# Patient Record
Sex: FEMALE | Race: WHITE | NOT HISPANIC OR LATINO | Employment: OTHER | URBAN - METROPOLITAN AREA
[De-identification: names, ages, dates, MRNs, and addresses within clinical notes are randomized per-mention and may not be internally consistent; named-entity substitution may affect disease eponyms.]

---

## 2017-04-20 DIAGNOSIS — C50.912 MALIGNANT NEOPLASM OF LEFT FEMALE BREAST, UNSPECIFIED SITE OF BREAST: Primary | ICD-10-CM

## 2017-05-11 ENCOUNTER — ONCOLOGY VISIT (OUTPATIENT)
Dept: ONCOLOGY | Facility: CLINIC | Age: 77
End: 2017-05-11
Payer: COMMERCIAL

## 2017-05-11 VITALS
DIASTOLIC BLOOD PRESSURE: 71 MMHG | SYSTOLIC BLOOD PRESSURE: 136 MMHG | TEMPERATURE: 98.3 F | HEIGHT: 66 IN | BODY MASS INDEX: 39.86 KG/M2 | RESPIRATION RATE: 20 BRPM | HEART RATE: 68 BPM | OXYGEN SATURATION: 96 % | WEIGHT: 248 LBS

## 2017-05-11 DIAGNOSIS — C50.912 MALIGNANT NEOPLASM OF LEFT FEMALE BREAST, UNSPECIFIED SITE OF BREAST: Primary | ICD-10-CM

## 2017-05-11 DIAGNOSIS — C50.912 MALIGNANT NEOPLASM OF LEFT FEMALE BREAST, UNSPECIFIED SITE OF BREAST: ICD-10-CM

## 2017-05-11 DIAGNOSIS — N91.2 ABSENCE OF MENSTRUATION: ICD-10-CM

## 2017-05-11 LAB
ALBUMIN SERPL-MCNC: 3.8 G/DL (ref 3.4–5)
ALP SERPL-CCNC: 75 U/L (ref 40–150)
ALT SERPL W P-5'-P-CCNC: 30 U/L (ref 0–50)
AST SERPL W P-5'-P-CCNC: 20 U/L (ref 0–45)
BILIRUB DIRECT SERPL-MCNC: 0.1 MG/DL (ref 0–0.2)
BILIRUB SERPL-MCNC: 0.5 MG/DL (ref 0.2–1.3)
CALCIUM SERPL-MCNC: 8.9 MG/DL (ref 8.5–10.1)
PROT SERPL-MCNC: 7.3 G/DL (ref 6.8–8.8)

## 2017-05-11 PROCEDURE — 36415 COLL VENOUS BLD VENIPUNCTURE: CPT | Performed by: INTERNAL MEDICINE

## 2017-05-11 PROCEDURE — 99214 OFFICE O/P EST MOD 30 MIN: CPT | Performed by: INTERNAL MEDICINE

## 2017-05-11 PROCEDURE — 82310 ASSAY OF CALCIUM: CPT | Performed by: INTERNAL MEDICINE

## 2017-05-11 PROCEDURE — 80076 HEPATIC FUNCTION PANEL: CPT | Performed by: INTERNAL MEDICINE

## 2017-05-11 ASSESSMENT — PAIN SCALES - GENERAL: PAINLEVEL: NO PAIN (0)

## 2017-05-11 NOTE — MR AVS SNAPSHOT
After Visit Summary   5/11/2017    Leida Goddard    MRN: [id number]           Patient Information     Date Of Birth          1940        Visit Information        Provider Department      5/11/2017 10:00 AM Heydi Queen MD [facility]        Today's Diagnoses     Malignant neoplasm of left female breast, unspecified site of breast (H)    -  1       Follow-ups after your visit        Your next 10 appointments already scheduled     Jun 06, 2017  9:00 AM CDT   DX HIP/PELVIS/SPINE with MGDX1   [facility])    [address] [place] [address] [id number] [phone]           Please do not take any of the following 48 hours prior to your exam: vitamins, calcium tablets, antacids.            Nov 14, 2017  9:15 AM CST   LAB with LAB ONC [facility])    [address] [place] [address] [id number] [phone]           Patient must bring picture ID.  Patient should be prepared to give a urine specimen  Please do not eat 10-12 hours before your appointment if you are coming in fasting for labs on lipids, cholesterol, or glucose (sugar).  Pregnant women should follow their Care Team instructions. Water with medications is okay. Do not drink coffee or other fluids.   If you have concerns about taking  your medications, please ask at office or if scheduling via Myert, send a message by clicking on Secure Messaging, Message Your Care Team.            Nov 14, 2017 10:00 AM CST   Return Visit with Heydi Queen MD   [facility])    [address] [place] [address] [id number] [phone]              Future tests that were ordered for you today     Open Future Orders        Priority Expected Expires Ordered    DX Hip/Pelvis/Spine Routine 5/11/2017 5/11/2018 5/11/2017            Who to contact     If you have  "questions or need follow up information about today's clinic visit or your schedule please contact [facility] directly at [phone].  Normal or non-critical lab and imaging results will be communicated to you by MyChart, letter or phone within 4 business days after the clinic has received the results. If you do not hear from us within 7 days, please contact the clinic through Tienda [employer] / [employer]m Shophart or phone. If you have a critical or abnormal lab result, we will notify you by phone as soon as possible.  Submit refill requests through Pipefish or call your pharmacy and they will forward the refill request to us. Please allow 3 business days for your refill to be completed.          Additional Information About Your Visit        Pipefish Information     Pipefish is an electronic gateway that provides easy, online access to your medical records. With Pipefish, you can request a clinic appointment, read your test results, renew a prescription or communicate with your care team.     To sign up for Pipefish visit the website at [email or web address]   You will be asked to enter the access code listed below, as well as some personal information. Please follow the directions to create your username and password.     Your access code is: [email or web address]  Expires: 2017 10:05 AM     Your access code will  in 90 days. If you need help or a new code, please contact your [facility] or call [phone] for assistance.        Care EveryWhere ID     This is your Care EveryWhere ID. This could be used by other organizations to access your [place] medical records  [id number]        Your Vitals Were     Pulse Temperature Respirations Height Pulse Oximetry BMI (Body Mass Index)    68 98.3  F (36.8  C) (Oral) 20 1.689 m (5' 6.5\") 96% 39.43 kg/m2       Blood Pressure from Last 3 Encounters:   17 136/71   16 128/62   16 121/69    Weight from Last 3 Encounters:   17 " 112.5 kg (248 lb)   03/01/16 112.8 kg (248 lb 11.2 oz)   09/01/15 114.1 kg (251 lb 9.6 oz)               Primary Care Provider Office Phone # Fax #    Casey Kinsey [phone]       [facility] [address] [place] [address]        Thank you!     Thank you for choosing [facility]  for your care. Our goal is always to provide you with excellent care. Hearing back from our patients is one way we can continue to improve our services. Please take a few minutes to complete the written survey that you may receive in the mail after your visit with us. Thank you!             Your Updated Medication List - Protect others around you: Learn how to safely use, store and throw away your medicines at www.disposemymeds.org.          This list is accurate as of: 5/11/17 10:05 AM.  Always use your most recent med list.                   Brand Name Dispense Instructions for use    acetaminophen 500 MG tablet    TYLENOL     Take 500 mg by mouth every 4 hours as needed       anastrozole 1 MG tablet    ARIMIDEX    90 tablet    Take 1 tablet (1 mg) by mouth daily       CRESTOR 10 MG tablet   Generic drug:  rosuvastatin      Take 10 mg by mouth       econazole nitrate 1 % cream     340 g    Apply topically 2 times daily       furosemide 40 MG tablet    LASIX     Take 20 mg by mouth daily       levothyroxine 25 MCG tablet    SYNTHROID/LEVOTHROID     1 TABLET DAILY       losartan 100 MG tablet    COZAAR     Take 100 mg by mouth daily.       potassium chloride SA 20 MEQ CR tablet    K-DUR/KLOR-CON M     Take  by mouth daily.       PROPRANOLOL HCL PO      Take 40 mg by mouth 2 times daily.       SB LOW DOSE ASA EC 81 MG EC tablet   Generic drug:  aspirin      1 TABLET DAILY       VICTOZA SC      Inject 0.6 mg Subcutaneous daily       Vitamin D-3 5000 UNITS Tabs      Take by mouth daily

## 2017-05-11 NOTE — NURSING NOTE
"Oncology Rooming Note    May 11, 2017 9:44 AM   Leida Goddard is a 77 year old female who presents for:    Chief Complaint   Patient presents with     Oncology Clinic Visit     6 mo f/u     Initial Vitals: /71  Pulse 68  Temp 98.3  F (36.8  C) (Oral)  Resp 20  Ht 1.689 m (5' 6.5\")  Wt 112.5 kg (248 lb)  SpO2 96%  BMI 39.43 kg/m2 Estimated body mass index is 39.43 kg/(m^2) as calculated from the following:    Height as of this encounter: 1.689 m (5' 6.5\").    Weight as of this encounter: 112.5 kg (248 lb). Body surface area is 2.3 meters squared.  No Pain (0) Comment: Data Unavailable   No LMP recorded. Patient has had a hysterectomy.  Allergies reviewed: Yes  Medications reviewed: Yes    Medications: Medication refills not needed today.  Pharmacy name entered into FriendsEAT: [id number] - [place], [place] - [address] [facility]    Clinical concerns:     8 minutes for nursing intake (face to face time)     THAD CHANEL LPN              "

## 2017-05-12 NOTE — PROGRESS NOTES
ONCOLOGY DIAGNOSIS: January 2013: Stage I, T1a NX multifocal invasive lobular carcinoma of the left breast. Final pathology showed 4 foci of invasive lobular carcinoma, largest measuring 0.11 cm. There was also a 0.1 cm and 0.08 cm and 0.05 cm. Lymph nodes were not examined. Lymphovascular invasion not identified. Estrogen receptor positive, progesterone receptor positive, HER-2 non-amplified by FISH.      THERAPY TO DATE:   1. January 7, 2013. Left breast seed localized excisional biopsy.   2. February 2013- April 2013. Radiotherapy to left breast with 5040 cGy and 1000 centigrade tumor bed boost.   3. April 2013 to Present. Adjuvant anastrozole 1 mg p.o. daily.      INTERVAL HISTORY:  Leida Goddard is a delightful 77-year-old female diagnosed with stage I, pT1a NX multifocal invasive lobular carcinoma, grade 2, with LCIS in the left breast in 01/2013 after found to have calcifications on routine mammogram.  Patient presents to clinic for followup of her malignancy.  On today's visit, she tells me she is doing extremely well.  In fact, she has met with a nutritionist and has already lost significant weight.  She is looking forward to going fishing later today.  Denies any fevers, chills, nausea, vomiting, chest pain, shortness of breath or cough.  No abdominal discomfort.  No new palpable masses.  The remainder of a comprehensive review of systems is negative.      SOCIAL HISTORY:  , retired.  No current tobacco or alcohol use.  Plans meeting with a friend and going fishing on [place] later today.      PHYSICAL EXAMINATION:   VITAL SIGNS:  Blood pressure 136/71, pulse 68, respirations 20, temp 98.3, pulse ox 96% on room air, weight 248 pounds, BMI is 39.43.   GENERAL:  Comfortable, no acute distress, pleasant.   HEENT:  Atraumatic, normocephalic.  Pupils equal, reactive.  Sclerae anicteric.  Oropharynx:  Moist mucous membranes.  No lesions, ulcers or exudate.   NECK:  Supple, full range of motion.   Trachea midline.   HEART:  Regular rate and rhythm, normal S1, S2, no murmurs, gallop.  No edema.   LUNGS:  Clear to auscultation bilaterally.  No crackles or wheezes.  Normal respiratory effort.   ABDOMEN:  Positive bowel sounds, soft, nontender.   EXTREMITIES:  No cyanosis, warm.   MUSCULOSKELETAL:  No point tenderness.   LYMPHATICS:  No cervical, supraclavicular, axillary nodes palpable.   SKIN:  No petechiae or rashes.   NEUROLOGIC:  Alert and oriented.   BREASTS:  Examined in the upright and supine position.  Right breast larger than the left.  Right breast, no dominant masses.  Left breast, radiation changes and surgical defect in the lower quadrants.  Nipple everted without discharge.      LABORATORY DATA:  Alk phos 75, ALT 30, AST 20.      Mammogram from 11/2016 shows heterogeneously dense breasts.  Lumpectomy scar is noted in the left medial breast, but there is no evidence for malignancy.  No change compared to prior study.       DEXA scan from 03/2015 showed osteopenia. Compared to 10/2013 there does seem to be progression or decreased density. Previously, her T-score was -0.1 in the lumbar area and -0.8 in the left femoral neck. Currently, the T-score is -1.1 in the left femoral neck and -1.0 the lumbar spine.     ASSESSMENT AND PLAN:   1.  Stage I, T1a NX multifocal invasive lobular carcinoma with lobular carcinoma in situ of left breast.  No evidence of recurrence by history, physical or most recent mammogram.  Tolerating anastrozole.  Continue Arimidex.  Return to clinic in 6 months with labs including LFTs.  Next mammogram 11/2017.   2.  Osteopenia.  Patient did discuss the fact that her most recent DEXA scan showed slightly worsening of her bone mineral density with her primary.  They discussed initiating a bisphosphonate; however, Dr. Grissom felt they should wait a little bit longer.  Therefore, encouraged the patient calcium and vitamin D.  I recommended repeat DEXA since it has been 2 years since  the last one.   3.  Hypercholesterolemia.  Patient's last labs from 10/2016 showed cholesterol 116, LDL 33, HDL 56, triglycerides 133.  Will need to continue to monitor annually since this may go up with the Arimidex.   4.  History of pulmonary nodules.  Patient did have a CT 2013 which shows stable calcified and noncalcified pulmonary nodules.  Nodules have been stable for greater than 2 years, consistent with benign etiology.   5.  Dyspnea.  Longstanding.  Felt to be related to deconditioning.  Followed by her primary.   6.  Health care maintenance.  Discussed the importance of healthy lifestyle with diet and exercise.  Applauded patient's current weight loss.  Recommended 30 minutes of moderate exercise 5 times a week or 75 minutes of vigorous exercise 3 times a week.         ERICK GUTIÉRREZ MD             D: 2017 10:08   T: 2017 13:43   MT: TS      Name:     LINDA BARRIENTOS   MRN:      [id number]        Account:      [id number]   :      1940           Visit Date:   2017      Document: [id number]       cc: Harley Grissom MD

## 2017-06-27 ENCOUNTER — OFFICE VISIT (OUTPATIENT)
Dept: DERMATOLOGY | Facility: CLINIC | Age: 77
End: 2017-06-27
Payer: COMMERCIAL

## 2017-06-27 DIAGNOSIS — Z86.19 H/O COLD SORES: ICD-10-CM

## 2017-06-27 DIAGNOSIS — L82.0 INFLAMED SEBORRHEIC KERATOSIS: ICD-10-CM

## 2017-06-27 DIAGNOSIS — D18.01 CHERRY ANGIOMA: Primary | ICD-10-CM

## 2017-06-27 DIAGNOSIS — L56.8 ACTINIC CHEILITIS: ICD-10-CM

## 2017-06-27 DIAGNOSIS — Z85.828 H/O NONMELANOMA SKIN CANCER: ICD-10-CM

## 2017-06-27 PROCEDURE — 99213 OFFICE O/P EST LOW 20 MIN: CPT | Mod: 25 | Performed by: DERMATOLOGY

## 2017-06-27 PROCEDURE — 17000 DESTRUCT PREMALG LESION: CPT | Mod: 59 | Performed by: DERMATOLOGY

## 2017-06-27 PROCEDURE — 17110 DESTRUCTION B9 LES UP TO 14: CPT | Performed by: DERMATOLOGY

## 2017-06-27 RX ORDER — VALACYCLOVIR HYDROCHLORIDE 500 MG/1
TABLET, FILM COATED ORAL
Qty: 3 TABLET | Refills: 0 | Status: SHIPPED | OUTPATIENT
Start: 2017-06-27 | End: 2017-11-14

## 2017-06-27 NOTE — MR AVS SNAPSHOT
After Visit Summary   6/27/2017    Leida Goddard    MRN: [id number]           Patient Information     Date Of Birth          1940        Visit Information        Provider Department      6/27/2017 10:45 AM Darwin Walsh MD [facility]        Today's Diagnoses     Cherry angioma    -  1    Inflamed seborrheic keratosis        Actinic cheilitis        H/O nonmelanoma skin cancer        H/O cold sores          Care Instructions    Cryotherapy    What is it?    Use of a very cold liquid, such as liquid nitrogen, to freeze and destroy abnormal skin cells that need to be removed    What should I expect?    Tenderness and redness    A small blister that might grow and fill with dark purple blood. There may be crusting.    More than one treatment may be needed if the lesions do not go away.    How do I care for the treated area?    Gently wash the area with your hands when bathing.    Use a thin layer of Vaseline to help with healing. You may use a Band-Aid.     The area should heal within 7-10 days and may leave behind a pink or lighter color.     Do not use an antibiotic or Neosporin ointment.     You may take acetaminophen (Tylenol) for pain.     Call your Doctor if you have:    Severe pain    Signs of infection (warmth, redness, cloudy yellow drainage, and or a bad smell)    Questions or concerns    Who should I call with questions?       [facility]: [phone]       [facility]: [phone]       For urgent needs outside of business hours call the [facility] at [phone]        and ask for the dermatology resident on call            Follow-ups after your visit        Your next 10 appointments already scheduled     Nov 14, 2017  9:15 AM CST   LAB with LAB ONC [facility] ([facility])    [address] [place] [id number] [phone]            Patient must bring picture ID.  Patient should be prepared to give a urine specimen  Please do not eat 10-12 hours before your appointment if you are coming in fasting for labs on lipids, cholesterol, or glucose (sugar).  Pregnant women should follow their Care Team instructions. Water with medications is okay. Do not drink coffee or other fluids.   If you have concerns about taking  your medications, please ask at office or if scheduling via [email or web address], send a message by clicking on Secure Messaging, Message Your Care Team.            Nov 14, 2017 10:00 AM CST   [facility]    [address] [place] [id number] [phone]              Who to contact     If you have questions or need follow up information about today's clinic visit or your schedule please contact [facility] directly at [phone].  Normal or non-critical lab and imaging results will be communicated to you by MyChart, letter or phone within 4 business days after the clinic has received the results. If you do not hear from us within 7 days, please contact the clinic through THERAVECTYShart or phone. If you have a critical or abnormal lab result, we will notify you by phone as soon as possible.  Submit refill requests through [email or web address] or call your pharmacy and they will forward the refill request to us. Please allow 3 business days for your refill to be completed.          Additional Information About Your Visit        [email or web address] Information     [email or web address] is an electronic gateway that provides easy, online access to your medical records. With [email or web address], you can request a clinic appointment, read your test results, renew a prescription or communicate with your care team.     To sign up for [email or web address] visit the website at [email or web address]   You will be asked to enter the access code listed below, as well as some personal information.  Please follow the directions to create your username and password.     Your access code is: [email or web address]  Expires: 2017 10:05 AM     Your access code will  in 90 days. If you need help or a new code, please contact your [facility] or call [phone] for assistance.        Care EveryWhere ID     This is your Care EveryWhere ID. This could be used by other organizations to access your [place] medical records  [id number]         Blood Pressure from Last 3 Encounters:   17 136/71   16 128/62   16 121/69    Weight from Last 3 Encounters:   17 112.5 kg (248 lb)   16 112.8 kg (248 lb 11.2 oz)   09/01/15 114.1 kg (251 lb 9.6 oz)              We Performed the Following     DESTRUCT PREMALIGNANT LESION, FIRST          Today's Medication Changes          These changes are accurate as of: 17  4:52 PM.  If you have any questions, ask your nurse or doctor.               Start taking these medicines.        Dose/Directions    valACYclovir 500 MG tablet   Commonly known as:  VALTREX   Used for:  H/O cold sores   Started by:  Darwin Walsh MD        Take 1 daily for 3 days starting today.   Quantity:  3 tablet   Refills:  0            Where to get your medicines      These medications were sent to [id number] - [place] - [address] [facility]  [address] [address]     Phone:  [phone]     valACYclovir 500 MG tablet                Primary Care Provider Office Phone # Fax #    Casey Kinsey [phone]       [facility] [address] [place] [address]        Equal Access to Services     KRIS WAGNER AH: Hadconstantine Jung, waaxda luqadaha, qaybta kaalmada yehuda, drake serna. So [facility] [phone].    ATENCIÓN: Si habla español, tiene a hernandez disposición servicios gratuitos de asistencia lingüística. Llame al  [phone].    We comply with applicable federal civil rights laws and [place] laws. We do not discriminate on the basis of race, color, national origin, age, disability sex, sexual orientation or gender identity.            Thank you!     Thank you for choosing [facility]  for your care. Our goal is always to provide you with excellent care. Hearing back from our patients is one way we can continue to improve our services. Please take a few minutes to complete the written survey that you may receive in the mail after your visit with us. Thank you!             Your Updated Medication List - Protect others around you: Learn how to safely use, store and throw away your medicines at www.disposemymeds.org.          This list is accurate as of: 6/27/17  4:52 PM.  Always use your most recent med list.                   Brand Name Dispense Instructions for use Diagnosis    acetaminophen 500 MG tablet    TYLENOL     Take 500 mg by mouth every 4 hours as needed        anastrozole 1 MG tablet    ARIMIDEX    90 tablet    Take 1 tablet (1 mg) by mouth daily    Malignant neoplasm of left female breast, unspecified site of breast       CRESTOR 10 MG tablet   Generic drug:  rosuvastatin      Take 10 mg by mouth        econazole nitrate 1 % cream     340 g    Apply topically 2 times daily    Tinea corporis       furosemide 40 MG tablet    LASIX     Take 20 mg by mouth daily        levothyroxine 25 MCG tablet    SYNTHROID/LEVOTHROID     1 TABLET DAILY        losartan 100 MG tablet    COZAAR     Take 100 mg by mouth daily.        potassium chloride SA 20 MEQ CR tablet    K-DUR/KLOR-CON M     Take  by mouth daily.        PROPRANOLOL HCL PO      Take 40 mg by mouth 2 times daily.        SB LOW DOSE ASA EC 81 MG EC tablet   Generic drug:  aspirin      1 TABLET DAILY        valACYclovir 500 MG tablet    VALTREX    3 tablet    Take 1 daily for 3 days starting today.    H/O cold sores       VICTOZA SC      Inject 0.6  mg Subcutaneous daily        Vitamin D-3 5000 UNITS Tabs      Take by mouth daily

## 2017-06-27 NOTE — PROGRESS NOTES
"[facility] Dermatology Note      Dermatology Problem List:  1.Actinic cheilitis, cryo    Encounter Date: Jun 27, 2017    CC:  Chief Complaint   Patient presents with     Mole     black spot on upper back       History of Present Illness:  Ms. Leida Goddard is a 77 year old female with a history of NMSC (SCC in 1980's) who presents for a black spot on her upper back. She was last seen 12/12/2014 by Dr. Kumar when there were a few SKs that were treated with cryo. She was at dinner with her granddaughter when she was rubbing the pt's back when she felt a \"lump\" and believes part of it may have come off. It looked gray at the time and has since looked improved Pt reports getting cold sores 2-3 times a year. Pt is with her granddaughter today. No other lesions of concern.    Past Medical History:   Patient Active Problem List   Diagnosis     Diabetes mellitus (H)     DALIA (obstructive sleep apnea)     HBP (high blood pressure)     Cushing's syndrome (H)     Hypothyroidism     Cancer of breast (H)     S/P total knee arthroplasty: lt     S/P total hip arthroplasty: Rt     Anemia due to blood loss, acute     Edema     Hypercholesterolemia     Malignant neoplasm of left female breast, unspecified site of breast     Past Medical History:   Diagnosis Date     Breast cancer (H) 12/7/2012    Left breast     CA - breast cancer 2/7/2013     Essential hypertension, benign     Hypertension, Benign     DALIA (obstructive sleep apnea)      Type II or unspecified type diabetes mellitus without mention of complication, not stated as uncontrolled     Diabetes mellitus     Past Surgical History:   Procedure Laterality Date     C ANESTH,BLEPHAROPLASTY       CHOLECYSTECTOMY  1966     HYSTERECTOMY TOTAL ABDOMINAL, BILATERAL SALPINGO-OOPHORECTOMY, COMBINED      age 43     LUMPECTOMY BREAST  1/7/13    Left breast     SPINE SURGERY      Lumbar laminectomy       Social History:  The patient is retired. The patient denies " use of tanning beds.    Family History:  There is no family history of skin cancer.    Medications:  Current Outpatient Prescriptions   Medication Sig Dispense Refill     Liraglutide (VICTOZA SC) Inject 0.6 mg Subcutaneous daily       anastrozole (ARIMIDEX) 1 MG tablet Take 1 tablet (1 mg) by mouth daily 90 tablet 3     rosuvastatin (CRESTOR) 10 MG tablet Take 10 mg by mouth        acetaminophen (TYLENOL) 500 MG tablet Take 500 mg by mouth every 4 hours as needed       Cholecalciferol (VITAMIN D-3) 5000 UNITS TABS Take by mouth daily       furosemide (LASIX) 40 MG tablet Take 20 mg by mouth daily        potassium chloride SA (K-DUR,KLOR-CON M) 20 MEQ tablet Take  by mouth daily.       losartan (COZAAR) 100 MG tablet Take 100 mg by mouth daily.       PROPRANOLOL HCL PO Take 40 mg by mouth 2 times daily.       LEVOTHYROXINE SODIUM 25 MCG OR TABS 1 TABLET DAILY       SB LOW DOSE ASA EC 81 MG OR TBEC 1 TABLET DAILY       econazole nitrate 1 % cream Apply topically 2 times daily (Patient not taking: Reported on 6/27/2017) 340 g 1       Allergies   Allergen Reactions     Metformin Nausea and Vomiting     Adhesive Tape Hives     Augmentin Diarrhea     Hctz Rash     Lisinopril Cough       Review of Systems:  -Skin Establ Pt: The patient denies any new rash, pruritus, or lesions that are symptomatic, changing or bleeding, except as per HPI.  -Constitutional: The patient denies fatigue, fevers, chills, unintended weight loss, and night sweats.    Physical exam:  Vitals: There were no vitals taken for this visit.  GEN: This is a well developed, well-nourished female in no acute distress, in a pleasant mood.    NEURO: Alert and oriented  PSYCH: In pleasant mood, appropriate affect  SKIN: Focused examination of the face and back was performed.  -inflamed SK of the back, s/p trauma  -There are bright red some shaped papules scattered on the back.   -site of prior NMSC appears well-healed  -red, gritty papule on the [place]  border of the lower lip  -No other lesions of concern on areas examined.       Impression/Plan:  1. Hx of non-melanoma skin cancer, no evidence of recurrence    2. 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 Cryo. take Valtrex 500mg daily x 3 days to prevent core sores from treating with liquid nitrogen.  3. Seborrheic keratosis, inflamed upper back    Cryotherapy procedure note: After verbal consent and discussion of risks and benefits including but no limited to dyspigmentation/scar, blister, and pain, 2 (AK, inflamed SK) were treated with 1-2mm freeze border for 2 cycles with liquid nitrogen. Post cryotherapy instructions were provided.     4. Barker angiomas    No further intervention required. Patient to report changes.     Sun precaution was advised including the use of sun screens of SPF 30 or higher, sun protective clothing, and avoidance of tanning beds.      [facility] Physician Dr. Au on close of this encounter.  Follow-up prn for new or changing lesions.       Staff Involved:  Scribe/Staff    Scribe Disclosure:   I, Da Gamez, am serving as a scribe to document services personally performed by Dr. Darwin Walsh, based on data collection and the provider's statements to me.     Provider Disclosure:   I have reviewed the documentation recorded by the scribe and have edited it as needed. I have personally performed the services documented here and the documentation accurately represents those services and the decisions made by me.     Darwin Walsh MD, MS    Department of Dermatology  [facility]: Phone: [phone], Fax:[phone]  [facility]: Phone: [phone], Fax: [phone]

## 2017-06-27 NOTE — NURSING NOTE
Dermatology Rooming Note    Leida Goddard's goals for this visit include:   Chief Complaint   Patient presents with     Mole     black spot on upper back       Is a scribe okay for this visit:YES    Are records needed for this visit(If yes, obtain release of information): No     Vitals: There were no vitals taken for this visit.    Referring Provider:  ESTABLISHED PATIENT  No address on file    Rody Barron LPN

## 2017-06-27 NOTE — LETTER
"6/27/2017      RE: Leida Goddard  [id number] [address] [place] [address] [place]   [facility] [address]       [facility] Dermatology Note      Dermatology Problem List:  1.Actinic cheilitis, cryo    Encounter Date: Jun 27, 2017    CC:  Chief Complaint   Patient presents with     Mole     black spot on upper back       History of Present Illness:  Ms. Leida Goddard is a 77 year old female with a history of NMSC (SCC in 1980's) who presents for a black spot on her upper back. She was last seen 12/12/2014 by Dr. Kumar when there were a few SKs that were treated with cryo. She was at dinner with her granddaughter when she was rubbing the pt's back when she felt a \"lump\" and believes part of it may have come off. It looked gray at the time and has since looked improved Pt reports getting cold sores 2-3 times a year. Pt is with her granddaughter today. No other lesions of concern.    Past Medical History:   Patient Active Problem List   Diagnosis     Diabetes mellitus (H)     DALIA (obstructive sleep apnea)     HBP (high blood pressure)     Cushing's syndrome (H)     Hypothyroidism     Cancer of breast (H)     S/P total knee arthroplasty: lt     S/P total hip arthroplasty: Rt     Anemia due to blood loss, acute     Edema     Hypercholesterolemia     Malignant neoplasm of left female breast, unspecified site of breast     Past Medical History:   Diagnosis Date     Breast cancer (H) 12/7/2012    Left breast     CA - breast cancer 2/7/2013     Essential hypertension, benign     Hypertension, Benign     DALIA (obstructive sleep apnea)      Type II or unspecified type diabetes mellitus without mention of complication, not stated as uncontrolled     Diabetes mellitus     Past Surgical History:   Procedure Laterality Date     C ANESTH,BLEPHAROPLASTY       CHOLECYSTECTOMY  1966     HYSTERECTOMY TOTAL ABDOMINAL, BILATERAL SALPINGO-OOPHORECTOMY, COMBINED      age 43     LUMPECTOMY BREAST  1/7/13    Left breast     SPINE " SURGERY      Lumbar laminectomy       Social History:  The patient is retired. The patient denies use of tanning beds.    Family History:  There is no family history of skin cancer.    Medications:  Current Outpatient Prescriptions   Medication Sig Dispense Refill     Liraglutide (VICTOZA SC) Inject 0.6 mg Subcutaneous daily       anastrozole (ARIMIDEX) 1 MG tablet Take 1 tablet (1 mg) by mouth daily 90 tablet 3     rosuvastatin (CRESTOR) 10 MG tablet Take 10 mg by mouth        acetaminophen (TYLENOL) 500 MG tablet Take 500 mg by mouth every 4 hours as needed       Cholecalciferol (VITAMIN D-3) 5000 UNITS TABS Take by mouth daily       furosemide (LASIX) 40 MG tablet Take 20 mg by mouth daily        potassium chloride SA (K-DUR,KLOR-CON M) 20 MEQ tablet Take  by mouth daily.       losartan (COZAAR) 100 MG tablet Take 100 mg by mouth daily.       PROPRANOLOL HCL PO Take 40 mg by mouth 2 times daily.       LEVOTHYROXINE SODIUM 25 MCG OR TABS 1 TABLET DAILY       SB LOW DOSE ASA EC 81 MG OR TBEC 1 TABLET DAILY       econazole nitrate 1 % cream Apply topically 2 times daily (Patient not taking: Reported on 6/27/2017) 340 g 1       Allergies   Allergen Reactions     Metformin Nausea and Vomiting     Adhesive Tape Hives     Augmentin Diarrhea     Hctz Rash     Lisinopril Cough       Review of Systems:  -Skin Establ Pt: The patient denies any new rash, pruritus, or lesions that are symptomatic, changing or bleeding, except as per HPI.  -Constitutional: The patient denies fatigue, fevers, chills, unintended weight loss, and night sweats.    Physical exam:  Vitals: There were no vitals taken for this visit.  GEN: This is a well developed, well-nourished female in no acute distress, in a pleasant mood.    NEURO: Alert and oriented  PSYCH: In pleasant mood, appropriate affect  SKIN: Focused examination of the face and back was performed.  -inflamed SK of the back, s/p trauma  -There are bright red some shaped papules scattered  on the back.   -site of prior NMSC appears well-healed  -red, gritty papule on the [place] border of the lower lip  -No other lesions of concern on areas examined.       Impression/Plan:  1. Hx of non-melanoma skin cancer, no evidence of recurrence    2. 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 Cryo. take Valtrex 500mg daily x 3 days to prevent core sores from treating with liquid nitrogen.  3. Seborrheic keratosis, inflamed upper back    Cryotherapy procedure note: After verbal consent and discussion of risks and benefits including but no limited to dyspigmentation/scar, blister, and pain, 2 (AK, inflamed SK) were treated with 1-2mm freeze border for 2 cycles with liquid nitrogen. Post cryotherapy instructions were provided.     4. Barker angiomas    No further intervention required. Patient to report changes.     Sun precaution was advised including the use of sun screens of SPF 30 or higher, sun protective clothing, and avoidance of tanning beds.      [facility] Physician Dr. Au on close of this encounter.  Follow-up prn for new or changing lesions.       Staff Involved:  Scribe/Staff    Scribe Disclosure:   I, Da Gamez, am serving as a scribe to document services personally performed by Dr. Darwin Walsh, based on data collection and the provider's statements to me.     Provider Disclosure:   I have reviewed the documentation recorded by the scribe and have edited it as needed. I have personally performed the services documented here and the documentation accurately represents those services and the decisions made by me.     Darwin Walsh MD, MS    Department of Dermatology  [facility]: Phone: [phone], Fax:[phone]  [facility]: Phone: [phone], Fax: [phone]          Darwin Walsh MD

## 2017-06-27 NOTE — PATIENT INSTRUCTIONS
Cryotherapy    What is it?    Use of a very cold liquid, such as liquid nitrogen, to freeze and destroy abnormal skin cells that need to be removed    What should I expect?    Tenderness and redness    A small blister that might grow and fill with dark purple blood. There may be crusting.    More than one treatment may be needed if the lesions do not go away.    How do I care for the treated area?    Gently wash the area with your hands when bathing.    Use a thin layer of Vaseline to help with healing. You may use a Band-Aid.     The area should heal within 7-10 days and may leave behind a pink or lighter color.     Do not use an antibiotic or Neosporin ointment.     You may take acetaminophen (Tylenol) for pain.     Call your Doctor if you have:    Severe pain    Signs of infection (warmth, redness, cloudy yellow drainage, and or a bad smell)    Questions or concerns    Who should I call with questions?       [facility]: [phone]       [facility]: [phone]       For urgent needs outside of business hours call the [facility] at [phone]        and ask for the dermatology resident on call

## 2017-10-03 DIAGNOSIS — C50.812 MALIGNANT NEOPLASM OF OVERLAPPING SITES OF LEFT BREAST IN FEMALE, ESTROGEN RECEPTOR POSITIVE (H): Primary | ICD-10-CM

## 2017-10-03 DIAGNOSIS — Z17.0 MALIGNANT NEOPLASM OF OVERLAPPING SITES OF LEFT BREAST IN FEMALE, ESTROGEN RECEPTOR POSITIVE (H): Primary | ICD-10-CM

## 2017-10-03 RX ORDER — ANASTROZOLE 1 MG/1
1 TABLET ORAL DAILY
Qty: 90 TABLET | Refills: 3 | Status: SHIPPED | OUTPATIENT
Start: 2017-10-03 | End: 2018-05-15

## 2017-10-03 NOTE — TELEPHONE ENCOUNTER
Pending Prescriptions:                       Disp   Refills    anastrozole (ARIMIDEX) 1 MG tablet        90 tab*3            Sig: Take 1 tablet (1 mg) by mouth daily    Last filled 7/1/17    Rody Avina LPN

## 2017-10-27 DIAGNOSIS — Z17.0 MALIGNANT NEOPLASM OF OVERLAPPING SITES OF LEFT BREAST IN FEMALE, ESTROGEN RECEPTOR POSITIVE (H): Primary | ICD-10-CM

## 2017-10-27 DIAGNOSIS — C50.812 MALIGNANT NEOPLASM OF OVERLAPPING SITES OF LEFT BREAST IN FEMALE, ESTROGEN RECEPTOR POSITIVE (H): Primary | ICD-10-CM

## 2017-10-27 DIAGNOSIS — C50.919 CANCER OF BREAST (H): Primary | ICD-10-CM

## 2017-11-14 ENCOUNTER — ONCOLOGY VISIT (OUTPATIENT)
Dept: ONCOLOGY | Facility: CLINIC | Age: 77
End: 2017-11-14
Payer: COMMERCIAL

## 2017-11-14 VITALS
HEIGHT: 66 IN | DIASTOLIC BLOOD PRESSURE: 69 MMHG | RESPIRATION RATE: 20 BRPM | BODY MASS INDEX: 39.05 KG/M2 | OXYGEN SATURATION: 96 % | WEIGHT: 243 LBS | TEMPERATURE: 98.3 F | HEART RATE: 66 BPM | SYSTOLIC BLOOD PRESSURE: 117 MMHG

## 2017-11-14 DIAGNOSIS — Z17.0 MALIGNANT NEOPLASM OF OVERLAPPING SITES OF LEFT BREAST IN FEMALE, ESTROGEN RECEPTOR POSITIVE (H): Primary | ICD-10-CM

## 2017-11-14 DIAGNOSIS — Z17.0 MALIGNANT NEOPLASM OF OVERLAPPING SITES OF LEFT BREAST IN FEMALE, ESTROGEN RECEPTOR POSITIVE (H): ICD-10-CM

## 2017-11-14 DIAGNOSIS — C50.919 CANCER OF BREAST (H): ICD-10-CM

## 2017-11-14 DIAGNOSIS — C50.812 MALIGNANT NEOPLASM OF OVERLAPPING SITES OF LEFT BREAST IN FEMALE, ESTROGEN RECEPTOR POSITIVE (H): Primary | ICD-10-CM

## 2017-11-14 DIAGNOSIS — C50.812 MALIGNANT NEOPLASM OF OVERLAPPING SITES OF LEFT BREAST IN FEMALE, ESTROGEN RECEPTOR POSITIVE (H): ICD-10-CM

## 2017-11-14 DIAGNOSIS — Z12.31 ENCOUNTER FOR SCREENING MAMMOGRAM FOR BREAST CANCER: ICD-10-CM

## 2017-11-14 LAB
ALBUMIN SERPL-MCNC: 3.9 G/DL (ref 3.4–5)
ALP SERPL-CCNC: 75 U/L (ref 40–150)
ALT SERPL W P-5'-P-CCNC: 26 U/L (ref 0–50)
AST SERPL W P-5'-P-CCNC: 17 U/L (ref 0–45)
BILIRUB DIRECT SERPL-MCNC: 0.1 MG/DL (ref 0–0.2)
BILIRUB SERPL-MCNC: 0.6 MG/DL (ref 0.2–1.3)
CALCIUM SERPL-MCNC: 8.7 MG/DL (ref 8.5–10.1)
PROT SERPL-MCNC: 7.4 G/DL (ref 6.8–8.8)

## 2017-11-14 PROCEDURE — 82310 ASSAY OF CALCIUM: CPT | Performed by: INTERNAL MEDICINE

## 2017-11-14 PROCEDURE — 99214 OFFICE O/P EST MOD 30 MIN: CPT | Performed by: INTERNAL MEDICINE

## 2017-11-14 PROCEDURE — 36415 COLL VENOUS BLD VENIPUNCTURE: CPT | Performed by: INTERNAL MEDICINE

## 2017-11-14 PROCEDURE — 80076 HEPATIC FUNCTION PANEL: CPT | Performed by: INTERNAL MEDICINE

## 2017-11-14 ASSESSMENT — PAIN SCALES - GENERAL: PAINLEVEL: WORST PAIN (10)

## 2017-11-14 NOTE — PROGRESS NOTES
ONCOLOGY DIAGNOSIS: January 2013: Stage I, T1a NX multifocal invasive lobular carcinoma of the left breast. Final pathology showed 4 foci of invasive lobular carcinoma, largest measuring 0.11 cm. There was also a 0.1 cm and 0.08 cm and 0.05 cm. Lymph nodes were not examined. Lymphovascular invasion not identified. Estrogen receptor positive, progesterone receptor positive, HER-2 non-amplified by FISH.      THERAPY TO DATE:   1. January 7, 2013. Left breast seed localized excisional biopsy.   2. February 2013- April 2013. Radiotherapy to left breast with 5040 cGy and 1000 centigrade tumor bed boost.   3. April 2013 to Present. Adjuvant anastrozole 1 mg p.o. daily.      INTERVAL HISTORY:  Leida Goddard is a 77-year-old female diagnosed with stage I pT1a NX multifocal invasive lobular carcinoma, grade 2, with LCIS of the left breast in 01/2013 after found to have calcification on routine mammogram.  The patient presents to the clinic for followup of her malignancy.  On today's visit, the patient tells me her biggest complaint is lower back pain.  She has undergone EMG and MRI and is following up with Orthopedics in regard to this.  Dr. Grissom had placed her on tramadol.  The patient also tells me that she was noted on a recent DEXA scan to have osteoporosis and was started on Fosamax once a week.  She is also taking calcium and vitamin D.  The patient is happy to tell me that she continues to lose weight and has lost about 20 pounds since May.  She does have a rash on her chest.  This is related to being bit by flies when she was [place].  She otherwise denies any nausea, vomiting, fevers, chills or chest pain.  Her shortness of breath is improved with her weight loss.  No abdominal discomfort, change in bowels, melena or hematochezia.  No new palpable masses.  The remainder of a comprehensive review of systems is negative.      SOCIAL HISTORY:  She is .  She is retired.  No current tobacco use.  She  plans on spending Thanksgiving with her daughter and some other girlfriends and on Kam with her son-in-law's family.      PHYSICAL EXAMINATION:   VITAL SIGNS:  Blood pressure is 117/69, pulse 66, respirations 20, temperature 98.3 and pulse ox 96% on room air.  Weight 243 pounds. BMI is 30.64.   GENERAL:  She is comfortable and in no acute distress.  She is pleasant.   HEENT:  Atraumatic and normocephalic.  Pupils are equal, round and reactive.  Sclerae are anicteric.  Oropharynx:  Moist mucous membranes.  No lesions, ulcers or exudate.   NECK:  Supple with full range of motion.  Trachea is midline.   HEART:  Regular rate and rhythm.  Normal S1 and S2.  No murmurs or gallops.  No edema.   LUNGS:  Clear to auscultation bilaterally.  No crackles or wheezes.  Normal respiratory effort.   GASTROINTESTINAL:  Positive bowel sounds.  Soft and nontender.   EXTREMITIES:  No cyanosis, warm.   MUSCULOSKELETAL:  No point tenderness.   LYMPHATICS:  No cervical, supraclavicular or axillary nodes palpable.   SKIN:  Erythematous rash on the mid upper chest.   NEUROLOGIC:  Alert and oriented.  However, she has difficulty walking more related to pain.    BREASTS:   In the upright and supine position, right breast is larger than left.  Right breast shows no dominant masses.  Left breast shows radiation changes  and surgical defect in the lower quadrants.  Nipple without discharge.      Mammogram from 11/2016 shows heterogeneously dense breasts.  Lumpectomy scar is noted in the left medial breast, but there is no evidence for malignancy.  No change compared to prior study.      DEXA scan from 09/18/2017 shows osteoporosis.      ASSESSMENT/PLAN:   1.  Stage I pT1a NX multifocal invasive lobular carcinoma and lobular carcinoma in situ of the left breast.  No evidence of recurrence by history and physical.  The patient is due for a mammogram, and orders have been placed.  She is currently on Arimidex, which she is tolerating well.   We will plan to complete 5 years, which will be in 2018.  Return to the clinic in 6 months to see MD with labs.   2.  Osteoporosis.  The patient's previous DEXA scan from 2015 showed osteopenia.  However, her most recent one from 2017 showed osteoporosis.  The patient was started on Fosamax.  I discussed with the patient that the Arimidex clearly can increase her risk for osteoporosis.  The question is whether we should switch over to another endocrine therapy or just keep her on Arimidex.  The patient only has 6 more months left.  I would be concerned about switching therapies with side effects and toxicities.  It may be best to just continue the 6 months of Arimidex since she already has osteoporosis.  The patient is agreeable.  She would rather continue and complete her 5 years of Arimidex versus switching therapies at this time.   3.  History of hypercholesterolemia.  We will need to continue to monitor annually while she is on Arimidex.  Followed by her primary.   4.  History of pulmonary nodules.  The patient had a CT in 2013 that showed stable calcified and noncalcified pulmonary nodules.  It has been stable for over 2 years and felt to be benign in etiology.  Not addressed on today's visit.   5.  Dyspnea, long-standing.  Improved with weight loss.  Followed by her primary.   6.  Rash secondary to insects.  Currently using hydrocortisone.  Instructed the patient to follow up with her primary if this worsens.   7.  Health care maintenance.  I applauded the patient's efforts of continued weight loss.  Encouraged healthy lifestyle with diet and exercise.         ERICK GUTIÉRREZ MD             D: 2017 10:35   T: 2017 14:10   MT: [id number]      Name:     LINDA BARRIENTOS   MRN:      [id number]        Account:      [id number]   :      1940           Visit Date:   2017      Document: [id number]       cc: Samantha Grissom MD

## 2017-11-14 NOTE — NURSING NOTE
"Oncology Rooming Note    November 14, 2017 9:57 AM   Leida Goddard is a 77 year old female who presents for:    Chief Complaint   Patient presents with     Oncology Clinic Visit     6 mo f/u     Initial Vitals: There were no vitals taken for this visit. Estimated body mass index is 39.43 kg/(m^2) as calculated from the following:    Height as of 5/11/17: 1.689 m (5' 6.5\").    Weight as of 5/11/17: 112.5 kg (248 lb). There is no height or weight on file to calculate BSA.  Data Unavailable Comment: Data Unavailable   No LMP recorded. Patient has had a hysterectomy.  Allergies reviewed: Yes  Medications reviewed: Yes    Medications: Medication refills not needed today.  Pharmacy name entered into [email or web address] Ã¨â€[id number]±Ã¨â€¦[id number]§[id number]€˜: [facility]Y #[id number] - [place] - [address] [facility]    Clinical concerns:     8 minutes for nursing intake (face to face time)     THAD CHANEL LPN              "

## 2017-11-14 NOTE — LETTER
11/14/2017         RE: Leida Goddard  [address] [address] [place] [address] N   [facility] [address]        Dear Colleague,    Thank you for referring your patient, Leida Goddard, to the [facility]. Please see a copy of my visit note below.    ONCOLOGY DIAGNOSIS: January 2013: Stage I, T1a NX multifocal invasive lobular carcinoma of the left breast. Final pathology showed 4 foci of invasive lobular carcinoma, largest measuring 0.11 cm. There was also a 0.1 cm and 0.08 cm and 0.05 cm. Lymph nodes were not examined. Lymphovascular invasion not identified. Estrogen receptor positive, progesterone receptor positive, HER-2 non-amplified by FISH.      THERAPY TO DATE:   1. January 7, 2013. Left breast seed localized excisional biopsy.   2. February 2013- April 2013. Radiotherapy to left breast with 5040 cGy and 1000 centigrade tumor bed boost.   3. April 2013 to Present. Adjuvant anastrozole 1 mg p.o. daily.      INTERVAL HISTORY:  Leida Goddard is a 77-year-old female diagnosed with stage I pT1a NX multifocal invasive lobular carcinoma, grade 2, with LCIS of the left breast in 01/2013 after found to have calcification on routine mammogram.  The patient presents to the clinic for followup of her malignancy.  On today's visit, the patient tells me her biggest complaint is lower back pain.  She has undergone EMG and MRI and is following up with Orthopedics in regard to this.  Dr. Grissom had placed her on tramadol.  The patient also tells me that she was noted on a recent DEXA scan to have osteoporosis and was started on Fosamax once a week.  She is also taking calcium and vitamin D.  The patient is happy to tell me that she continues to lose weight and has lost about 20 pounds since May.  She does have a rash on her chest.  This is related to being bit [place] flies [place]  She otherwise denies any nausea, vomiting, fevers, chills or chest pain.  Her shortness of breath is improved  with her weight loss.  No abdominal discomfort, change in bowels, melena or hematochezia.  No new palpable masses.  The remainder of a comprehensive review of systems is negative.      SOCIAL HISTORY:  She is .  She is retired.  No current tobacco use.  She plans on spending Thanksgiving with her daughter and some other girlfriends and on [place] with her son-in-law's family.      PHYSICAL EXAMINATION:   VITAL SIGNS:  Blood pressure is 117/69, pulse 66, respirations 20, temperature 98.3 and pulse ox 96% on room air.  Weight 243 pounds. BMI is 30.64.   GENERAL:  She is comfortable and in no acute distress.  She is pleasant.   HEENT:  Atraumatic and normocephalic.  Pupils are equal, round and reactive.  Sclerae are anicteric.  Oropharynx:  Moist mucous membranes.  No lesions, ulcers or exudate.   NECK:  Supple with full range of motion.  Trachea is midline.   HEART:  Regular rate and rhythm.  Normal S1 and S2.  No murmurs or gallops.  No edema.   LUNGS:  Clear to auscultation bilaterally.  No crackles or wheezes.  Normal respiratory effort.   GASTROINTESTINAL:  Positive bowel sounds.  Soft and nontender.   EXTREMITIES:  No cyanosis, warm.   MUSCULOSKELETAL:  No point tenderness.   LYMPHATICS:  No cervical, supraclavicular or axillary nodes palpable.   SKIN:  Erythematous rash on the mid upper chest.   NEUROLOGIC:  Alert and oriented.  However, she has difficulty walking more related to pain.    BREASTS:   In the upright and supine position, right breast is larger than left.  Right breast shows no dominant masses.  Left breast shows radiation changes  and surgical defect in the lower quadrants.  Nipple without discharge.      Mammogram from 11/2016 shows heterogeneously dense breasts.  Lumpectomy scar is noted in the left medial breast, but there is no evidence for malignancy.  No change compared to prior study.      DEXA scan from 09/18/2017 shows osteoporosis.      ASSESSMENT/PLAN:   1.  Stage I pT1a NX  multifocal invasive lobular carcinoma and lobular carcinoma in situ of the left breast.  No evidence of recurrence by history and physical.  The patient is due for a mammogram, and orders have been placed.  She is currently on Arimidex, which she is tolerating well.  We will plan to complete 5 years, which will be in 04/2018.  Return to the clinic in 6 months to see MD with labs.   2.  Osteoporosis.  The patient's previous DEXA scan from 03/2015 showed osteopenia.  However, her most recent one from 09/2017 showed osteoporosis.  The patient was started on Fosamax.  I discussed with the patient that the Arimidex clearly can increase her risk for osteoporosis.  The question is whether we should switch over to another endocrine therapy or just keep her on Arimidex.  The patient only has 6 more months left.  I would be concerned about switching therapies with side effects and toxicities.  It may be best to just continue the 6 months of Arimidex since she already has osteoporosis.  The patient is agreeable.  She would rather continue and complete her 5 years of Arimidex versus switching therapies at this time.   3.  History of hypercholesterolemia.  We will need to continue to monitor annually while she is on Arimidex.  Followed by her primary.   4.  History of pulmonary nodules.  The patient had a CT in 02/2013 that showed stable calcified and noncalcified pulmonary nodules.  It has been stable for over 2 years and felt to be benign in etiology.  Not addressed on today's visit.   5.  Dyspnea, long-standing.  Improved with weight loss.  Followed by her primary.   6.  Rash secondary to insects.  Currently using hydrocortisone.  Instructed the patient to follow up with her primary if this worsens.   7.  Health care maintenance.  I applauded the patient's efforts of continued weight loss.  Encouraged healthy lifestyle with diet and exercise.         ERICK GUTIÉRREZ MD             D: 11/14/2017 10:35   T: 11/14/2017 14:10    MT: [id number]      Name:     LINDA BARRIENTOS   MRN:      [id number]        Account:      [id number]   :      1940           Visit Date:   2017      Document: [id number]       cc: Samantha Grissom MD       Again, thank you for allowing me to participate in the care of your patient.        Sincerely,        Heydi Queen MD

## 2017-11-14 NOTE — MR AVS SNAPSHOT
After Visit Summary   11/14/2017    Leida Goddard    MRN: [id number]           Patient Information     Date Of Birth          1940        Visit Information        Provider Department      11/14/2017 10:00 AM Heydi Queen MD [facility]        Today's Diagnoses     Malignant neoplasm of overlapping sites of left breast in female, estrogen receptor positive (H)    -  1    Encounter for screening mammogram for breast cancer           Follow-ups after your visit        Your next 10 appointments already scheduled     May 15, 2018 10:40 AM CDT   LAB with LAB FIRST FLOOR [facility] ([facility])    [address] [place] [address]   [phone]           Please do not eat 10-12 hours before your appointment if you are coming in fasting for labs on lipids, cholesterol, or glucose (sugar). This does not apply to pregnant women. Water, hot tea and black coffee (with nothing added) are okay. Do not drink other fluids, diet soda or chew gum.            May 15, 2018 11:00 AM CDT   MA SCREENING DIGITAL BILATERAL with MGMA1, MG MA TECH   [facility] ([facility])    [address] [place] [id number] [phone]           Do not use any powder, lotion or deodorant under your arms or on your breast. If you do, we will ask you to remove it before your exam.  Wear comfortable, two-piece clothing.  If you have any allergies, tell your care team.  Bring any previous mammograms from other facilities or have them mailed to the breast center. Three-dimensional (3D) mammograms are available at [facility] locations in [facility], [facility], [facility], and [place]. [facility] locations include [place] and [facility] in [place]. Benefits of 3D mammograms include: - Improved rate of cancer detection - Decreases your chance of having  "to go back for more tests, which means fewer: - \"False-positive\" results (This means that there is an abnormal area but it isn't cancer.) - Invasive testing procedures, such as a biopsy or surgery - Can provide clearer images of the breast if you have dense breast tissue. 3D mammography is an optional exam that anyone can have with a 2D mammogram. It doesn't replace or take the place of a 2D mammogram. 2D mammograms remain an effective screening test for all women.  Not all insurance companies cover the cost of a 3D mammogram. Check with your insurance.            May 15, 2018 11:30 AM CDT   Return Visit with Heydi Queen MD   [facility] ([facility])    [address] [place] [address] [phone]              Future tests that were ordered for you today     Open Future Orders        Priority Expected Expires Ordered    MA Screening Digital Bilateral Routine  11/14/2018 11/14/2017            Who to contact     If you have questions or need follow up information about today's clinic visit or your schedule please contact [facility] directly at [phone].  Normal or non-critical lab and imaging results will be communicated to you by Allakoshart, letter or phone within 4 business days after the clinic has received the results. If you do not hear from us within 7 days, please contact the clinic through Rx Networkt or phone. If you have a critical or abnormal lab result, we will notify you by phone as soon as possible.  Submit refill requests through Wunsch-Brautkleid or call your pharmacy and they will forward the refill request to us. Please allow 3 business days for your refill to be completed.          Additional Information About Your Visit        Wunsch-Brautkleid Information     Wunsch-Brautkleid is an electronic gateway that provides easy, online access to your medical records. With Wunsch-Brautkleid, you can request a clinic appointment, read your test results, renew a " "prescription or communicate with your care team.     To sign up for MyChart visit the website at [email or web address]   You will be asked to enter the access code listed below, as well as some personal information. Please follow the directions to create your username and password.     Your access code is: [email or web address]  Expires: 2018 10:07 AM     Your access code will  in 90 days. If you need help or a new code, please contact your [facility] or call [phone] for assistance.        Care EveryWhere ID     This is your Care EveryWhere ID. This could be used by other organizations to access your [facility] [place] medical records  [id number]        Your Vitals Were     Pulse Temperature Respirations Height Pulse Oximetry BMI (Body Mass Index)    66 98.3  F (36.8  C) (Oral) 20 1.689 m (5' 6.5\") 96% 38.64 kg/m2       Blood Pressure from Last 3 Encounters:   17 117/69   17 136/71   16 128/62    Weight from Last 3 Encounters:   17 110.2 kg (243 lb)   17 112.5 kg (248 lb)   16 112.8 kg (248 lb 11.2 oz)               Primary Care Provider Office Phone # Fax #    Casey Kinsey [phone]       [facility] [address] [place] [address]        Equal Access to Services     : Hadii aad ku hadasho Soomaali, waaxda luqadaha, qaybta kaalmada adeegyada, drake chow . So [facility] [phone].    ATENCIÓN: Si habla español, tiene a hernandez disposición servicios gratuitos de asistencia lingüística. Llame al [phone].    We comply with applicable federal civil rights laws and [place] laws. We do not discriminate on the basis of race, color, national origin, age, disability, sex, sexual orientation, or gender identity.            Thank you!     Thank you for choosing [facility]  for your care. Our goal is always to provide you with excellent care. Hearing back from " our patients is one way we can continue to improve our services. Please take a few minutes to complete the written survey that you may receive in the mail after your visit with us. Thank you!             Your Updated Medication List - Protect others around you: Learn how to safely use, store and throw away your medicines at www.disposemymeds.org.          This list is accurate as of: 11/14/17 11:59 PM.  Always use your most recent med list.                   Brand Name Dispense Instructions for use Diagnosis    anastrozole 1 MG tablet    ARIMIDEX    90 tablet    Take 1 tablet (1 mg) by mouth daily    Malignant neoplasm of overlapping sites of left breast in female, estrogen receptor positive (H)       calcium citrate-vitamin D 315-200 MG-UNIT Tabs per tablet    CITRACAL     Take 1 tablet by mouth daily        CRESTOR 10 MG tablet   Generic drug:  rosuvastatin      Take 10 mg by mouth        econazole nitrate 1 % cream     340 g    Apply topically 2 times daily    Tinea corporis       furosemide 40 MG tablet    LASIX     Take 20 mg by mouth daily        levothyroxine 25 MCG tablet    SYNTHROID/LEVOTHROID     1 TABLET DAILY        losartan 100 MG tablet    COZAAR     Take 100 mg by mouth daily.        potassium chloride SA 20 MEQ CR tablet    K-DUR/KLOR-CON M     Take  by mouth daily.        PROPRANOLOL HCL PO      Take 40 mg by mouth 2 times daily.        SB LOW DOSE ASA EC 81 MG EC tablet   Generic drug:  aspirin      1 TABLET DAILY        TRAMADOL HCL PO      Take 50 mg by mouth every 6 hours as needed for moderate to severe pain        VICTOZA SC      Inject 0.6 mg Subcutaneous daily

## 2018-05-14 ENCOUNTER — DOCUMENTATION ONLY (OUTPATIENT)
Dept: LAB | Facility: CLINIC | Age: 78
End: 2018-05-14

## 2018-05-14 DIAGNOSIS — Z17.0 MALIGNANT NEOPLASM OF OVERLAPPING SITES OF LEFT BREAST IN FEMALE, ESTROGEN RECEPTOR POSITIVE (H): Primary | ICD-10-CM

## 2018-05-14 DIAGNOSIS — C50.812 MALIGNANT NEOPLASM OF OVERLAPPING SITES OF LEFT BREAST IN FEMALE, ESTROGEN RECEPTOR POSITIVE (H): Primary | ICD-10-CM

## 2018-05-15 ENCOUNTER — ONCOLOGY VISIT (OUTPATIENT)
Dept: ONCOLOGY | Facility: CLINIC | Age: 78
End: 2018-05-15
Payer: COMMERCIAL

## 2018-05-15 ENCOUNTER — RADIANT APPOINTMENT (OUTPATIENT)
Dept: MAMMOGRAPHY | Facility: CLINIC | Age: 78
End: 2018-05-15
Attending: INTERNAL MEDICINE
Payer: COMMERCIAL

## 2018-05-15 VITALS
BODY MASS INDEX: 37.28 KG/M2 | WEIGHT: 232 LBS | SYSTOLIC BLOOD PRESSURE: 153 MMHG | HEART RATE: 57 BPM | RESPIRATION RATE: 15 BRPM | OXYGEN SATURATION: 97 % | DIASTOLIC BLOOD PRESSURE: 75 MMHG | TEMPERATURE: 98 F | HEIGHT: 66 IN

## 2018-05-15 DIAGNOSIS — Z17.0 MALIGNANT NEOPLASM OF OVERLAPPING SITES OF LEFT BREAST IN FEMALE, ESTROGEN RECEPTOR POSITIVE (H): ICD-10-CM

## 2018-05-15 DIAGNOSIS — C50.812 MALIGNANT NEOPLASM OF OVERLAPPING SITES OF LEFT BREAST IN FEMALE, ESTROGEN RECEPTOR POSITIVE (H): Primary | ICD-10-CM

## 2018-05-15 DIAGNOSIS — Z12.31 ENCOUNTER FOR SCREENING MAMMOGRAM FOR BREAST CANCER: ICD-10-CM

## 2018-05-15 DIAGNOSIS — C50.812 MALIGNANT NEOPLASM OF OVERLAPPING SITES OF LEFT BREAST IN FEMALE, ESTROGEN RECEPTOR POSITIVE (H): ICD-10-CM

## 2018-05-15 DIAGNOSIS — Z17.0 MALIGNANT NEOPLASM OF OVERLAPPING SITES OF LEFT BREAST IN FEMALE, ESTROGEN RECEPTOR POSITIVE (H): Primary | ICD-10-CM

## 2018-05-15 LAB
ALBUMIN SERPL-MCNC: 3.9 G/DL (ref 3.4–5)
ALP SERPL-CCNC: 74 U/L (ref 40–150)
ALT SERPL W P-5'-P-CCNC: 22 U/L (ref 0–50)
AST SERPL W P-5'-P-CCNC: 18 U/L (ref 0–45)
BILIRUB DIRECT SERPL-MCNC: 0.1 MG/DL (ref 0–0.2)
BILIRUB SERPL-MCNC: 0.4 MG/DL (ref 0.2–1.3)
PROT SERPL-MCNC: 7.5 G/DL (ref 6.8–8.8)

## 2018-05-15 PROCEDURE — 36415 COLL VENOUS BLD VENIPUNCTURE: CPT | Performed by: INTERNAL MEDICINE

## 2018-05-15 PROCEDURE — 80076 HEPATIC FUNCTION PANEL: CPT | Performed by: INTERNAL MEDICINE

## 2018-05-15 PROCEDURE — 99214 OFFICE O/P EST MOD 30 MIN: CPT | Performed by: INTERNAL MEDICINE

## 2018-05-15 PROCEDURE — 77067 SCR MAMMO BI INCL CAD: CPT

## 2018-05-15 ASSESSMENT — PAIN SCALES - GENERAL: PAINLEVEL: SEVERE PAIN (7)

## 2018-05-15 NOTE — MR AVS SNAPSHOT
"              After Visit Summary   5/15/2018    Leida Goddard    MRN: [id number]           Patient Information     Date Of Birth          1940        Visit Information        Provider Department      5/15/2018 11:30 AM Heydi Queen MD [facility]        Today's Diagnoses     Malignant neoplasm of overlapping sites of left breast in female, estrogen receptor positive (H)    -  1    Encounter for screening mammogram for breast cancer           Follow-ups after your visit        Your next 10 appointments already scheduled     May 15, 2018 11:30 AM CDT   Return Visit with Heydi Queen MD   [facility] ([facility])    [address] [place] [id number] [phone]            May 15, 2018 12:30 PM CDT   MA SCREENING DIGITAL BILATERAL with MGMA1, MG MA TECH   [facility] ([facility])    [address] [place] [id number] [phone]           Do not use any powder, lotion or deodorant under your arms or on your breast. If you do, we will ask you to remove it before your exam.  Wear comfortable, two-piece clothing.  If you have any allergies, tell your care team.  Bring any previous mammograms from other facilities or have them mailed to the breast center. Three-dimensional (3D) mammograms are available at [facility] locations in [facility], [facility], [facility], [place], [place], and [place]. [facility] locations include [place] [facility] in [place]. Benefits of 3D mammograms include: - Improved rate of cancer detection - Decreases your chance of having to go back for more tests, which means fewer: - \"False-positive\" results (This means that there is an abnormal area but it isn't cancer.) - Invasive testing procedures, such as a biopsy or surgery - Can provide clearer images of the breast if you have dense breast tissue. 3D mammography is " an optional exam that anyone can have with a 2D mammogram. It doesn't replace or take the place of a 2D mammogram. 2D mammograms remain an effective screening test for all women.  Not all insurance companies cover the cost of a 3D mammogram. Check with your insurance.            May 14, 2019 10:45 AM CDT   LAB with LAB ONC [facility] ([facility])    [address] [place] [id number] [phone]           Please do not eat 10-12 hours before your appointment if you are coming in fasting for labs on lipids, cholesterol, or glucose (sugar). This does not apply to pregnant women. Water, hot tea and black coffee (with nothing added) are okay. Do not drink other fluids, diet soda or chew gum.            May 14, 2019 11:30 AM CDT   Return Visit with Heydi Queen MD   [facility] ([facility])    [address] [place] [address] [phone]              Future tests that were ordered for you today     Open Standing Orders        Priority Remaining Interval Expires Ordered    Hepatic panel Routine 5/5  5/15/2019 5/15/2018          Open Future Orders        Priority Expected Expires Ordered    MA Screening Digital Bilateral Routine  5/15/2019 5/15/2018            Who to contact     If you have questions or need follow up information about today's clinic visit or your schedule please contact [facility] directly at [phone].  Normal or non-critical lab and imaging results will be communicated to you by MyChart, letter or phone within 4 business days after the clinic has received the results. If you do not hear from us within 7 days, please contact the clinic through [employer] Lounge[employer] or phone. If you have a critical or abnormal lab result, we will notify you by phone as soon as possible.  Submit refill requests through Boxxet or call your pharmacy and they will forward the refill request to us.  "Please allow 3 business days for your refill to be completed.          Additional Information About Your Visit        Celletrahart Information     AdCamp is an electronic gateway that provides easy, online access to your medical records. With AdCamp, you can request a clinic appointment, read your test results, renew a prescription or communicate with your care team.     To sign up for AdCamp visit the website at [email or web address]   You will be asked to enter the access code listed below, as well as some personal information. Please follow the directions to create your username and password.     Your access code is: [email or web address]  Expires: 2018 11:18 AM     Your access code will  in 90 days. If you need help or a new code, please contact your [facility] or call [phone] for assistance.        Care EveryWhere ID     This is your Care EveryWhere ID. This could be used by other organizations to access your [place] medical records  [id number]        Your Vitals Were     Pulse Temperature Respirations Height Pulse Oximetry BMI (Body Mass Index)    57 98  F (36.7  C) 15 1.689 m (5' 6.5\") 97% 36.89 kg/m2       Blood Pressure from Last 3 Encounters:   05/15/18 153/75   17 117/69   17 136/71    Weight from Last 3 Encounters:   05/15/18 105.2 kg (232 lb)   17 110.2 kg (243 lb)   17 112.5 kg (248 lb)                 Today's Medication Changes          These changes are accurate as of 5/15/18 11:18 AM.  If you have any questions, ask your nurse or doctor.               Stop taking these medicines if you haven't already. Please contact your care team if you have questions.     anastrozole 1 MG tablet   Commonly known as:  ARIMIDEX   Stopped by:  Heydi Queen MD                    Primary Care Provider Office Phone # Fax #    Casey Tio [phone]       [facility] [address] [place] [address]      "   Equal Access to Services     [facility] : Hadii oswaldo underwood harris Jung, waaxda luqadaha, qaybta kaalmaconstantino blackman, drake serna. So [facility] [place] [phone].    ATENCIÓN: Si habla español, tiene a hernandez disposición servicios gratuitos de asistencia lingüística. Jean Paulame al [phone].    We comply with applicable federal civil rights laws and [place] laws. We do not discriminate on the basis of race, color, national origin, age, disability, sex, sexual orientation, or gender identity.            Thank you!     Thank you for choosing [facility]  for your care. Our goal is always to provide you with excellent care. Hearing back from our patients is one way we can continue to improve our services. Please take a few minutes to complete the written survey that you may receive in the mail after your visit with us. Thank you!             Your Updated Medication List - Protect others around you: Learn how to safely use, store and throw away your medicines at www.disposemymeds.org.          This list is accurate as of 5/15/18 11:18 AM.  Always use your most recent med list.                   Brand Name Dispense Instructions for use Diagnosis    calcium citrate-vitamin D 315-200 MG-UNIT Tabs per tablet    CITRACAL     Take 1 tablet by mouth daily        CRESTOR 10 MG tablet   Generic drug:  rosuvastatin      Take 10 mg by mouth        econazole nitrate 1 % cream     340 g    Apply topically 2 times daily    Tinea corporis       furosemide 40 MG tablet    LASIX     Take 20 mg by mouth daily        levothyroxine 25 MCG tablet    SYNTHROID/LEVOTHROID     1 TABLET DAILY        losartan 100 MG tablet    COZAAR     Take 100 mg by mouth daily.        potassium chloride SA 20 MEQ CR tablet    K-DUR/KLOR-CON M     Take  by mouth daily.        PROPRANOLOL HCL PO      Take 40 mg by mouth 2 times daily.        SB LOW DOSE ASA EC 81 MG EC tablet   Generic drug:  aspirin      1 TABLET DAILY         TRAMADOL HCL PO      Take 50 mg by mouth every 6 hours as needed for moderate to severe pain        VICTOZA SC      Inject 0.6 mg Subcutaneous daily

## 2018-05-15 NOTE — PROGRESS NOTES
Visit Date:   05/15/2018      DATE OF VISIT:  05/15/2018      ONCOLOGY DIAGNOSIS: January 2013: Stage I, T1a NX multifocal invasive lobular carcinoma of the left breast. Final pathology showed 4 foci of invasive lobular carcinoma, largest measuring 0.11 cm. There was also a 0.1 cm and 0.08 cm and 0.05 cm. Lymph nodes were not examined. Lymphovascular invasion not identified. Estrogen receptor positive, progesterone receptor positive, HER-2 non-amplified by FISH.       THERAPY TO DATE:   1. January 7, 2013. Left breast seed localized excisional biopsy.   2. February 2013- April 2013. Radiotherapy to left breast with 5040 cGy and 1000 centigrade tumor bed boost.   3. April 2013 to Present. Adjuvant anastrozole 1 mg p.o. daily.      INTERVAL HISTORY:  Leida is an 78-year-old female diagnosed with stage I, pT1a NX multifocal invasive lobular carcinoma, grade 2 with LCIS of the left breast in 01/2013 after found to have calcifications on routine mammogram.  The patient presents to clinic for followup of her malignancy.  The patient states that since I last saw her, she underwent a left hip replacement.  Has been experiencing generalized body aches and bone aches and has limited her ability to get around.  She did try the Fosamax for her osteopenia, but she did not tolerate this well and stopped.  She is hoping to come off of the Arimidex soon.  Does have some generalized aches.  No fevers, chills, nausea, vomiting, chest pain, shortness of breath, or cough.  Does have loose stools.  No abdominal discomfort.  No new palpable masses and the remainder of a comprehensive review of systems is negative.      SOCIAL HISTORY:  .  Retired.  No current tobacco use.  Usually likes to get out to the [employer], but has not been able to do much because she has some generalized aches. Lives by herself and drives herself.     PHYSICAL EXAMINATION:   VITAL SIGNS:  Blood pressure 153/75, pulse 57, respirations 15, temperature 98,  pulse ox 97% on room air, weight 232 pounds, BMI 36.89.   GENERAL:  Comfortable, in no acute distress.   HEENT:  Atraumatic, normocephalic.  Pupils equal, round, and reactive.  Sclerae are anicteric.  Oropharynx:  Moist mucous membranes.  No lesions, ulcers or exudate.   NECK:  Supple, full range of motion.  Trachea midline.   HEART:  Regular rate and rhythm, normal S1, S2.   LUNGS:  Clear to auscultation.  No crackles or wheezes.  Normal respiratory effort.   ABDOMEN:  Positive bowel sounds, soft and nontender.   EXTREMITIES:  No cyanosis, warm.   MUSCULOSKELETAL:  No point tenderness.   LYMPHATICS:  No cervical, supraclavicular or axillary nodes.   SKIN:  No petechiae or rashes.   NEUROLOGIC:  Alert and oriented.   BREASTS:  Examined in the upright and supine position.  Right breast:  No dominant masses. Nipple everted without discharge. Left breast: Radiation changes with surgical defect in the lower inner quadrant.  Nipple without discharge.        Mammogram from 11/2016 shows heterogeneously dense breasts.  Lumpectomy scar is noted in the left medial breast, but there is no evidence for malignancy.  No change compared to prior study.       DEXA scan from 09/18/2017 shows osteoporosis.      ASSESSMENT AND PLAN:   1.  Stage I, pT1a NX multifocal invasive lobular carcinoma with lobular carcinoma in situ of the left breast.  No evidence of recurrence by history or physical.  The patient has now completed 5 years of Arimidex.  She is experiencing generalized aches and pains.  At the age of 78, I do not feel she requires any further anastrozole.  Will discontinue.  The patient will return to see us in 1 year with LFTs.   2.  Osteoporosis.  The patient tried Fosamax but did not tolerate this well.  At this point, she does not want to be on any form of bisphosphonate.  Will continue to discuss this with her primary.  Patient is also now coming off the Arimidex, which increased her risk.   3.  History of pulmonary  nodules.  CT scan showed 2 years of stability.   4. Dyspnea. Reported on previous visit has improved with her weight loss.    4.  Health care maintenance.  Discussed the importance of a healthy lifestyle and diet.  The patient is limited in how much she can do because of her aches.  Hoping that with discontinuing the Arimidex, she will be able to be a little more active.        ERICK GUTIÉRREZ MD             D: 05/15/2018   T: 05/15/2018   MT: HERNANDEZ      Name:     LINDA BARRIENTOS   MRN:      [id number]        Account:      [id number]   :      1940           Visit Date:   05/15/2018      Document: [id number]       cc: Samantha Grissom MD

## 2018-05-15 NOTE — NURSING NOTE
"Oncology Rooming Note    May 15, 2018 11:01 AM   Leida Goddard is a 78 year old female who presents for:    Chief Complaint   Patient presents with     Oncology Clinic Visit     6 month follow up      Initial Vitals: /75  Pulse 57  Temp 98  F (36.7  C)  Resp 15  Ht 1.689 m (5' 6.5\")  Wt 105.2 kg (232 lb)  SpO2 97%  BMI 36.89 kg/m2 Estimated body mass index is 36.89 kg/(m^2) as calculated from the following:    Height as of this encounter: 1.689 m (5' 6.5\").    Weight as of this encounter: 105.2 kg (232 lb). Body surface area is 2.22 meters squared.  Severe Pain (7) Comment: Leg pain. Tylenol as needed.    No LMP recorded. Patient has had a hysterectomy.  Allergies reviewed: Yes  Medications reviewed: Yes    Medications: Medication refills not needed today.  Pharmacy name entered into [facility]: [id number] - [place], [place] - [address] [facility]        5 minutes for nursing intake (face to face time)     Rody Avina LPN              "

## 2019-11-13 ENCOUNTER — ONCOLOGY VISIT (OUTPATIENT)
Dept: ONCOLOGY | Facility: CLINIC | Age: 79
End: 2019-11-13
Payer: COMMERCIAL

## 2019-11-13 ENCOUNTER — ANCILLARY PROCEDURE (OUTPATIENT)
Dept: MAMMOGRAPHY | Facility: CLINIC | Age: 79
End: 2019-11-13
Attending: INTERNAL MEDICINE
Payer: COMMERCIAL

## 2019-11-13 VITALS
TEMPERATURE: 98 F | BODY MASS INDEX: 39.74 KG/M2 | RESPIRATION RATE: 20 BRPM | HEART RATE: 58 BPM | HEIGHT: 66 IN | DIASTOLIC BLOOD PRESSURE: 69 MMHG | OXYGEN SATURATION: 96 % | WEIGHT: 247.3 LBS | SYSTOLIC BLOOD PRESSURE: 149 MMHG

## 2019-11-13 DIAGNOSIS — Z12.31 VISIT FOR SCREENING MAMMOGRAM: ICD-10-CM

## 2019-11-13 DIAGNOSIS — C50.812 MALIGNANT NEOPLASM OF OVERLAPPING SITES OF LEFT BREAST IN FEMALE, ESTROGEN RECEPTOR POSITIVE (H): ICD-10-CM

## 2019-11-13 DIAGNOSIS — Z17.0 MALIGNANT NEOPLASM OF OVERLAPPING SITES OF LEFT BREAST IN FEMALE, ESTROGEN RECEPTOR POSITIVE (H): ICD-10-CM

## 2019-11-13 DIAGNOSIS — Z12.31 VISIT FOR SCREENING MAMMOGRAM: Primary | ICD-10-CM

## 2019-11-13 LAB
ALBUMIN SERPL-MCNC: 3.9 G/DL (ref 3.4–5)
ALP SERPL-CCNC: 83 U/L (ref 40–150)
ALT SERPL W P-5'-P-CCNC: 21 U/L (ref 0–50)
AST SERPL W P-5'-P-CCNC: 15 U/L (ref 0–45)
BILIRUB DIRECT SERPL-MCNC: 0.2 MG/DL (ref 0–0.2)
BILIRUB SERPL-MCNC: 0.6 MG/DL (ref 0.2–1.3)
PROT SERPL-MCNC: 7.1 G/DL (ref 6.8–8.8)

## 2019-11-13 PROCEDURE — 80076 HEPATIC FUNCTION PANEL: CPT | Performed by: INTERNAL MEDICINE

## 2019-11-13 PROCEDURE — 77067 SCR MAMMO BI INCL CAD: CPT | Performed by: RADIOLOGY

## 2019-11-13 PROCEDURE — 99214 OFFICE O/P EST MOD 30 MIN: CPT | Performed by: INTERNAL MEDICINE

## 2019-11-13 PROCEDURE — 36415 COLL VENOUS BLD VENIPUNCTURE: CPT | Performed by: INTERNAL MEDICINE

## 2019-11-13 ASSESSMENT — MIFFLIN-ST. JEOR: SCORE: 1611.37

## 2019-11-13 ASSESSMENT — PAIN SCALES - GENERAL: PAINLEVEL: NO PAIN (0)

## 2019-11-13 NOTE — NURSING NOTE
"Oncology Rooming Note    November 13, 2019 8:42 AM   Leida Goddard is a 79 year old female who presents for:    Chief Complaint   Patient presents with     Oncology Clinic Visit     1 year follow up     Initial Vitals: BP (!) 149/69 (BP Location: Right arm)   Pulse 58   Temp 98  F (36.7  C) (Oral)   Resp 20   Ht 1.689 m (5' 6.5\")   Wt 112.2 kg (247 lb 4.8 oz)   SpO2 96%   BMI 39.32 kg/m   Estimated body mass index is 39.32 kg/m  as calculated from the following:    Height as of this encounter: 1.689 m (5' 6.5\").    Weight as of this encounter: 112.2 kg (247 lb 4.8 oz). Body surface area is 2.29 meters squared.  No Pain (0) Comment: Data Unavailable   No LMP recorded. Patient has had a hysterectomy.  Allergies reviewed: Yes  Medications reviewed: Yes    Medications: Medication refills not needed today.  Pharmacy name entered into [facility]: [id number] [place] - [id number] [facility] AT [facility]    Yamileth Knight LPN              "
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
## 2020-02-05 ENCOUNTER — OFFICE VISIT (OUTPATIENT)
Dept: GASTROENTEROLOGY | Facility: CLINIC | Age: 80
End: 2020-02-05
Payer: COMMERCIAL

## 2020-02-05 VITALS
TEMPERATURE: 97.9 F | HEART RATE: 58 BPM | OXYGEN SATURATION: 97 % | SYSTOLIC BLOOD PRESSURE: 140 MMHG | WEIGHT: 242.1 LBS | DIASTOLIC BLOOD PRESSURE: 63 MMHG | BODY MASS INDEX: 38.5 KG/M2 | RESPIRATION RATE: 18 BRPM

## 2020-02-05 DIAGNOSIS — K21.9 GASTROESOPHAGEAL REFLUX DISEASE, ESOPHAGITIS PRESENCE NOT SPECIFIED: ICD-10-CM

## 2020-02-05 DIAGNOSIS — K58.0 IRRITABLE BOWEL SYNDROME WITH DIARRHEA: ICD-10-CM

## 2020-02-05 DIAGNOSIS — R19.7 DIARRHEA, UNSPECIFIED TYPE: Primary | ICD-10-CM

## 2020-02-05 DIAGNOSIS — E11.9 TYPE 2 DIABETES MELLITUS WITHOUT COMPLICATION, WITHOUT LONG-TERM CURRENT USE OF INSULIN (H): ICD-10-CM

## 2020-02-05 PROCEDURE — 99203 OFFICE O/P NEW LOW 30 MIN: CPT | Performed by: PHYSICIAN ASSISTANT

## 2020-02-05 RX ORDER — ESCITALOPRAM OXALATE 10 MG/1
10 TABLET ORAL DAILY
COMMUNITY
End: 2022-11-16 | Stop reason: ALTCHOICE

## 2020-02-05 RX ORDER — ERGOCALCIFEROL 1.25 MG/1
50000 CAPSULE, LIQUID FILLED ORAL WEEKLY
COMMUNITY

## 2020-02-05 RX ORDER — POTASSIUM CHLORIDE 1500 MG/1
20 TABLET, EXTENDED RELEASE ORAL 2 TIMES DAILY
COMMUNITY

## 2020-02-05 RX ORDER — MULTIVITAMIN WITH IRON
1 TABLET ORAL 2 TIMES DAILY
COMMUNITY

## 2020-02-05 RX ORDER — GLIPIZIDE 5 MG/1
5 TABLET ORAL
COMMUNITY
End: 2022-03-22

## 2020-02-05 ASSESSMENT — PAIN SCALES - GENERAL: PAINLEVEL: MILD PAIN (2)

## 2020-02-05 NOTE — PATIENT INSTRUCTIONS
Take prilosec on an empty stomach about 30 minutes before breakfast. Your second dose of prilosec can be on an empty stomach at bedtime.     You can take lactaid over the counter when eating dairy products.     Avoid any foods that trigger your symptoms.     Start taking Citrucel (fiber supplement) 1 teaspoon daily with water.     If this is ineffective, then take imodium once daily as needed for your diarrhea.

## 2020-02-05 NOTE — PROGRESS NOTES
GASTROENTEROLOGY NEW PATIENT CLINIC VISIT    CC/REFERRING MD:    Casey Kinsey  No ref. provider found    REASON FOR CONSULTATION:  New Patient (Diarrhea)      HISTORY OF PRESENT ILLNESS:    Leida Goddard is 79 year old female with pmhx of DM who presents for evaluation of diarrhea that she has had for the psat 4-5 years. Symptoms started after she was temporarily in a nursing home/LTAC facility following a knee replacement. Since then she has noted diarrhea off and on. She has noted several dietary triggers. For example increased sugar in her diet or dairy products can lead to her having diarrhea. She has also noted diarrhea with certain types of fruit. She typically has 3-4 loose BM's a day. She hardly has a formed stool. There is no blood or black stools. There is no unintentional weight loss. Occ she will have abdominal cramping prior to diarrhea onset, this cramping typically resolved after having a BM. She is currently on magnesium due to hypomagnesia which she states was recommended by PCP. She notes that she had diarrhea prior to starting mg supplementation.     She notes that her brother has celiac disease and she recently had celiac screen by her PCP and found to be negative. She also had stool studies recently which were negative for infectious etiology including C. Diff. She has had stool studies periodically in the last several years as well which have been negative.     She also has acid reflux and takes Prilosec twice daily after meals. She feels that her acid reflux is not well controlled at this time. She denies any recent changes in medications. Denies NSAID use.       PROBLEM LIST  Patient Active Problem List    Diagnosis Date Noted     Malignant neoplasm of overlapping sites of left breast in female, estrogen receptor positive (H) 11/01/2016     Priority: Medium     Hypercholesterolemia 10/24/2014     Priority: Medium     Edema 05/20/2014     Priority: Medium     S/P total hip  arthroplasty: Rt 05/04/2014     Priority: Medium     2nd to [facility]: Dr MEY Nunez, [facility]       Anemia due to blood loss, acute 05/04/2014     Priority: Medium     S/P total knee arthroplasty: lt 01/14/2014     Priority: Medium     Cancer of breast (H) 02/07/2013     Priority: Medium     Cushing's syndrome (H) 10/25/2012     Priority: Medium     Hypothyroidism 10/25/2012     Priority: Medium     Diabetes mellitus (H) 02/22/2010     Priority: Medium     Diet controlled  (Problem list name updated by automated process. Provider to review and confirm.)       DALIA (obstructive sleep apnea) 02/22/2010     Priority: Medium     HBP (high blood pressure) 02/22/2010     Priority: Medium       PERTINENT PAST MEDICAL HISTORY:  (I personally reviewed this history with the patient at today's visit)   Past Medical History:   Diagnosis Date     Breast cancer (H) 12/7/2012    Left breast     CA - breast cancer 2/7/2013     Essential hypertension, benign     Hypertension, Benign     DALIA (obstructive sleep apnea)      Type II or unspecified type diabetes mellitus without mention of complication, not stated as uncontrolled     Diabetes mellitus         PREVIOUS SURGERIES: (I personally reviewed this history with the patient at today's visit)   Past Surgical History:   Procedure Laterality Date     C ANESTH,BLEPHAROPLASTY       CHOLECYSTECTOMY  1966     HYSTERECTOMY TOTAL ABDOMINAL, BILATERAL SALPINGO-OOPHORECTOMY, COMBINED      age 43     LUMPECTOMY BREAST  1/7/13    Left breast     SPINE SURGERY      Lumbar laminectomy         ALLERGIES:     Allergies   Allergen Reactions     Metformin Nausea and Vomiting     Adhesive Tape Hives     Augmentin Diarrhea     Hctz Rash     Lisinopril Cough       PERTINENT MEDICATIONS:    Current Outpatient Medications:      calcium citrate-vitamin D (CITRACAL) 315-200 MG-UNIT TABS per tablet, Take 1 tablet by mouth daily, Disp: , Rfl:      econazole nitrate 1 % cream, Apply topically 2 times daily, Disp:  340 g, Rfl: 1     escitalopram (LEXAPRO) 10 MG tablet, Take 10 mg by mouth daily, Disp: , Rfl:      furosemide (LASIX) 40 MG tablet, Take 20 mg by mouth daily , Disp: , Rfl:      glipiZIDE (GLUCOTROL) 5 MG tablet, Take 5 mg by mouth 2 times daily (before meals), Disp: , Rfl:      LEVOTHYROXINE SODIUM 25 MCG OR TABS, 1 TABLET DAILY, Disp: , Rfl:      Liraglutide (VICTOZA SC), Inject 0.6 mg Subcutaneous daily, Disp: , Rfl:      losartan (COZAAR) 100 MG tablet, Take 100 mg by mouth daily., Disp: , Rfl:      magnesium 250 MG tablet, Take 1 tablet by mouth 2 times daily, Disp: , Rfl:      omeprazole (PRILOSEC) 20 MG DR capsule, Take 20 mg by mouth daily, Disp: , Rfl:      potassium chloride ER (K-DUR/KLOR-CON M) 20 MEQ CR tablet, Take 20 mEq by mouth 2 times daily, Disp: , Rfl:      potassium chloride SA (K-DUR,KLOR-CON M) 20 MEQ tablet, Take  by mouth daily., Disp: , Rfl:      PROPRANOLOL HCL PO, Take 40 mg by mouth 2 times daily., Disp: , Rfl:      rosuvastatin (CRESTOR) 10 MG tablet, Take 10 mg by mouth , Disp: , Rfl:      SB LOW DOSE ASA EC 81 MG OR TBEC, 1 TABLET DAILY, Disp: , Rfl:      TRAMADOL HCL PO, Take 50 mg by mouth every 6 hours as needed for moderate to severe pain, Disp: , Rfl:      vitamin D2 (ERGOCALCIFEROL) 83096 units (1250 mcg) capsule, Take 50,000 Units by mouth once a week, Disp: , Rfl:     SOCIAL HISTORY:  Social History     Socioeconomic History     Marital status:      Spouse name: Not on file     Number of children: Not on file     Years of education: Not on file     Highest education level: Not on file   Occupational History     Not on file   Social Needs     Financial resource strain: Not on file     Food insecurity:     Worry: Not on file     Inability: Not on file     Transportation needs:     Medical: Not on file     Non-medical: Not on file   Tobacco Use     Smoking status: Never Smoker     Smokeless tobacco: Never Used   Substance and Sexual Activity     Alcohol use: No     Drug  "use: No     Sexual activity: Not Currently     Partners: Male   Lifestyle     Physical activity:     Days per week: Not on file     Minutes per session: Not on file     Stress: Not on file   Relationships     Social connections:     Talks on phone: Not on file     Gets together: Not on file     Attends [employer] service: Not on file     Active member of club or organization: Not on file     Attends meetings of clubs or organizations: Not on file     Relationship status: Not on file     Intimate partner violence:     Fear of current or ex partner: Not on file     Emotionally abused: Not on file     Physically abused: Not on file     Forced sexual activity: Not on file   Other Topics Concern     Parent/sibling w/ CABG, MI or angioplasty before 65F 55M? Not Asked   Social History Narrative     Not on file       FAMILY HISTORY: (I personally reviewed this history with the patient at today's visit)  Family History   Problem Relation Age of Onset     Cancer Mother 40        Uterine, diagnosed late \"40s\",  at age 83     Respiratory Mother         Emphysema     Cancer Father 50        prostate with bone mets,  age 91     Cancer Brother 80        prostate, radiation therapy, living     Heart Disease Maternal Grandfather      Cancer Paternal Grandmother         Breast, age unknown     Heart Disease Paternal Grandfather      Cancer Maternal Grandmother         Cancer of the gallbladder         ROS:    No fevers or chills  No weight loss  No sore throat  No lymphadenopathy  No headache, paraesthesias, or weakness in a limb  No shortness of breath or wheezing  No chest pain or pressure  No rashes or skin changes  No odynophagia or dysphagia  +diarrhea   No BRBPR, hematochezia, melena  No dysuria, frequency or urgency  No hot/cold intolerance or polyria  No anxiety or depression  PHYSICAL EXAMINATION:  Constitutional: aaox3, cooperative, pleasant, not dyspneic/diaphoretic, no acute distress  Vitals reviewed: BP " (!) 140/63 (BP Location: Left arm, Patient Position: Sitting, Cuff Size: Adult Large)   Pulse 58   Temp 97.9  F (36.6  C) (Oral)   Resp 18   Wt 109.8 kg (242 lb 1.6 oz)   SpO2 97%   BMI 38.50 kg/m     Wt:   Wt Readings from Last 2 Encounters:   02/05/20 109.8 kg (242 lb 1.6 oz)   11/13/19 112.2 kg (247 lb 4.8 oz)        Eyes: Sclera anicteric/injected  Ears/nose/mouth/throat: Normal oropharynx without ulcers or exudate, mucus membranes moist  Neck: supple  CV: No edema, RRR  Respiratory: Unlabored breathing, CTAB  Abd: Nondistended, no masses, +bs, no hepatosplenomegaly, nontender, no peritoneal signs  Skin: warm, perfused, no jaundice  Psych: Normal affect  MSK: Normal gait      PERTINENT STUDIES: Recent labs reviewed through care everywhere including cbc, cmp, TSH         ASSESSMENT/PLAN:    Leida Goddard is a 79 year old female who presents for evaluation of chronic diarrhea onset 3-4 years ago. Symptoms first developed after knee replacement surgery. She has noted loose stools off and on since then. Work up recently includes negative infectious stool studies including C. Diff and negative celiac serology. Her symptoms appear to be triggered by certain food items, notably sugary foods, dairy products and certain fruits. She also notes abd cramping which is improved after a BM. Her symptoms are therefore most consistent with an IBS-D. Reviewed diagnosis and management with patient today. Encouraged her to avoid foods that trigger symptoms. Recommended lactaid if she is to eat dairy product. Also discussed role and benefit of working with a nutritionist to determine and help eliminate other dietary triggers. She is interested in meeting with nutritionist/dietitian for this and for her diabetes as well. Referral placed and patient scheduled. We discussed potential other etiology of her symptoms as well such as microscopic colitis. Discussed considering evaluation with a colonoscopy. She however wishes to  avoid colonoscopy and politely declines. As her symptoms are most consistent with IBS-D and she is without alarming symptoms, reasonable to delay/forego colonoscopy. Advised patient to trial citrucel fiber supplement daily. Can also take imodium as needed for diarrhea.     Follow up as needed.       Diarrhea, unspecified type  Irritable bowel syndrome with diarrhea  Gastroesophageal reflux disease, esophagitis presence not specified  - advised patient to continue with prilosec however to take on an empty stomach to improve effectiveness. Pt currently taking prilosec with meals.   - if still ineffective to control gerd, can consider addition of h2 blocker such as pepcid   Type 2 diabetes mellitus without complication, without long-term current use of insulin (H)    Orders Placed This Encounter   Procedures     NUTRITION REFERRAL       Thank you for this consultation.  It was a pleasure to participate in the care of this patient; please contact us with any further questions.      This note was created with voice recognition software, and while reviewed for accuracy, typos may remain.     Zuleyka Harkins PA-C  Gastroenterology  [facility]

## 2020-02-05 NOTE — NURSING NOTE
Leida Goddard's goals for this visit include:   Chief Complaint   Patient presents with     New Patient     Diarrhea       She requests these members of her care team be copied on today's visit information: Yes    PCP: Casey Kinsey    Referring Provider:  No referring provider defined for this encounter.    BP (!) 140/63 (BP Location: Left arm, Patient Position: Sitting, Cuff Size: Adult Large)   Pulse 58   Temp 97.9  F (36.6  C) (Oral)   Resp 18   Wt 109.8 kg (242 lb 1.6 oz)   SpO2 97%   BMI 38.50 kg/m      Do you need any medication refills at today's visit? No    Jase Ferrera CMA

## 2021-03-17 ENCOUNTER — TRANSFERRED RECORDS (OUTPATIENT)
Dept: HEALTH INFORMATION MANAGEMENT | Facility: CLINIC | Age: 81
End: 2021-03-17

## 2021-03-17 ENCOUNTER — MEDICAL CORRESPONDENCE (OUTPATIENT)
Dept: HEALTH INFORMATION MANAGEMENT | Facility: CLINIC | Age: 81
End: 2021-03-17

## 2021-03-17 LAB — RETINOPATHY: NEGATIVE

## 2021-03-18 ENCOUNTER — TELEPHONE (OUTPATIENT)
Dept: OPHTHALMOLOGY | Facility: CLINIC | Age: 81
End: 2021-03-18

## 2021-03-18 NOTE — TELEPHONE ENCOUNTER
Received referral from Dr. Dunphy at [facility] for entropion and possible bleph. Consult with Dr. Jain. Called and LM for pt, provided scheduling number. Can be scheduled next available with Dr. Jain.     Haleigh Rider, COA, 3:05 PM 03/18/2021

## 2021-03-23 ENCOUNTER — TRANSCRIBE ORDERS (OUTPATIENT)
Dept: OTHER | Age: 81
End: 2021-03-23

## 2021-03-23 DIAGNOSIS — H02.035 SENILE ENTROPION OF LEFT LOWER EYELID: Primary | ICD-10-CM

## 2021-03-31 ENCOUNTER — OFFICE VISIT (OUTPATIENT)
Dept: OPHTHALMOLOGY | Facility: CLINIC | Age: 81
End: 2021-03-31
Payer: COMMERCIAL

## 2021-03-31 DIAGNOSIS — H02.132 SENILE ECTROPION OF RIGHT LOWER EYELID: ICD-10-CM

## 2021-03-31 DIAGNOSIS — H02.403 INVOLUTIONAL PTOSIS, ACQUIRED, BILATERAL: Primary | ICD-10-CM

## 2021-03-31 DIAGNOSIS — Z96.1 PSEUDOPHAKIA OF BOTH EYES: ICD-10-CM

## 2021-03-31 DIAGNOSIS — H02.005 ENTROPION OF LEFT LOWER EYELID: ICD-10-CM

## 2021-03-31 PROCEDURE — 92081 LIMITED VISUAL FIELD XM: CPT | Performed by: OPHTHALMOLOGY

## 2021-03-31 PROCEDURE — 99204 OFFICE O/P NEW MOD 45 MIN: CPT | Performed by: OPHTHALMOLOGY

## 2021-03-31 PROCEDURE — 92285 EXTERNAL OCULAR PHOTOGRAPHY: CPT | Performed by: OPHTHALMOLOGY

## 2021-03-31 RX ORDER — FLUTICASONE PROPIONATE 50 MCG
1 SPRAY, SUSPENSION (ML) NASAL 2 TIMES DAILY
COMMUNITY
Start: 2021-01-29

## 2021-03-31 ASSESSMENT — CONF VISUAL FIELD: COMMENTS: TANGENT VISUAL FIELD PERFORMED TODAY.

## 2021-03-31 ASSESSMENT — TONOMETRY
IOP_METHOD: ICARE
OD_IOP_MMHG: 10
OS_IOP_MMHG: 14

## 2021-03-31 ASSESSMENT — VISUAL ACUITY
OD_SC: 20/25
OD_SC+: -2
OS_SC+: -1
OS_SC: 20/40
METHOD: SNELLEN - LINEAR

## 2021-03-31 NOTE — LETTER
3/31/2021         RE: Leida Goddard  [address] [address] [place] [address] [address] [facility] [address]  [facility] [id number]        Dear Dr. Dunphy,    Thank you for referring your patient, Leida Goddard, to the [facility]. Please see a copy of my visit note below.    Oculoplastic Clinic New Patient    Patient: Leida Goddard MRN# [id number]   YOB: 1940 Age: 80 year old   Date of Visit: Mar 31, 2021    CC: Droopy eyelids obstructing vision.              HPI:     Chief Complaint(s) and History of Present Illness(es)     Patient referred by Dr. Dunphy for Entropian evaluation, LLL. And   possible Bleph consult (Dr. Rojas performed lid lift on patient 7   years ago, and re-did the left eye shortly after. Patient has never been   happy with the results of the left eye).  Dr. Dunphy epilated eyelashes on   LLL at appointment on 03/17/2021 and instructed patient to use gel tears   for comfort as temporary relief. Patient has noticed her left lower lid   turning in for a few years, it continues to get worse. Left eye produces   lots of tears. Gel drops and epilation of lashes helps for a temporary   fix, but patient states that the epilation is painful and does not like   having it done.     Patient is Diabetic and has Hypothyroidism   Last A1C: 6.1 per patient        Leida Goddard is a 80 year old female who has noted gradual onset of droopy eyelids over the past years. The droopy eyelid is interfering with activities of daily living including driving, and reading. The patient denies double vision, variability of the eyelid position, or dry eye symptoms.     EXAM:     MRD1: 1 right eye a 0 left eye   Dermatochalasis with excess skin touching eyelashes   Severe left lower lid entropion, right lower eyelid ectropion, both upper eyelid ptosis, with high peaked crease left eye.     VISUAL FIELD:  Right eye untaped:15 degrees Right eye taped:50 degrees  Left eye untaped:15 degrees Left eye  taped:50 degrees      Assessment & Plan     Leida Goddard is a 80 year old female with the following diagnoses:   1. Involutional ptosis, acquired, bilateral    2. Entropion of left lower eyelid    3. Pseudophakia of both eyes    4. Senile ectropion of right lower eyelid       Discussed options - could address upper lids and lower lids at same time, or address her acute issue - left lower eyelid entropion.    She would like to only address left lower eyelid entropion - plan: left lower lid lts + quickert      ANTICOAGULATION:    None         PHOTOS DEMONSTRATE:    Blepharoptosis, high peaked crease on left  Severe left lower eyelid entropion  Right lower eyelid ectropion    Brow ptosis with thicker brow skin and hairs below the lateral superior orbital rim    Attending Physician Attestation: Complete documentation of historical and exam elements from today's encounter can be found in the full encounter summary report (not reduplicated in this progress note). I personally obtained the chief complaint(s) and history of present illness. I confirmed and edited as necessary the review of systems, past medical/surgical history, family history, social history, and examination findings as documented by others; and I examined the patient myself. I personally reviewed the relevant tests, images, and reports as documented above. I formulated and edited as necessary the assessment and plan and discussed the findings and management plan with the patient. Susy Marie MD      Today with Leida Goddard I reviewed the indications, risks, benefits, and alternatives of the proposed surgical procedure including, but not limited to, failure obtain the desired result  and need for additional surgery, bleeding, infection, loss of vision, loss of the eye, and the remote possibility of permanent damage to any organ system or death with the use of anesthesia.  I provided multiple opportunities for the questions, answered all  questions to the best of my ability, and confirmed that my answers and my discussion were understood.             Again, thank you for allowing me to participate in the care of your patient.        Sincerely,        Susy Marie MD    Oculoplastic and Orbital Surgery   Department of Ophthalmology and Visual Neurosciences  [facility]

## 2021-03-31 NOTE — NURSING NOTE
No chief complaint on file.      Chief Complaint(s) and History of Present Illness(es)     Patient referred by Dr. Dunphy for Entropian evaluation, LLL. And possible Bleph consult (Dr. Rojas performed lid lift on patient 7 years ago, and re-did the left eye shortly after. Patient has never been happy with the results of the left eye).  Dr. Dunphy epilated eyelashes on LLL at appointment on 03/17/2021 and instructed patient to use gel tears for comfort as temporary relief. Patient has noticed her left lower lid turning in for a few years, it continues to get worse. Left eye produces lots of tears. Gel drops and epilation of lashes helps for a temporary fix, but patient states that the epilation is painful and does not like having it done.     Patient is Diabetic and has Hypothyroidism   Last A1C: 6.1 per patient          Gilbert Mcneill, Ophthalmic Assistant

## 2021-03-31 NOTE — PROGRESS NOTES
Oculoplastic Clinic New Patient    Patient: Leida Goddard MRN# [id number]   YOB: 1940 Age: 80 year old   Date of Visit: Mar 31, 2021    CC: Droopy eyelids obstructing vision.              HPI:     Chief Complaint(s) and History of Present Illness(es)     Patient referred by Dr. Dunphy for Entropian evaluation, LLL. And   possible Bleph consult (Dr. Rojas performed lid lift on patient 7   years ago, and re-did the left eye shortly after. Patient has never been   happy with the results of the left eye).  Dr. Dunphy epilated eyelashes on   LLL at appointment on 03/17/2021 and instructed patient to use gel tears   for comfort as temporary relief. Patient has noticed her left lower lid   turning in for a few years, it continues to get worse. Left eye produces   lots of tears. Gel drops and epilation of lashes helps for a temporary   fix, but patient states that the epilation is painful and does not like   having it done.     Patient is Diabetic and has Hypothyroidism   Last A1C: 6.1 per patient        Leida Goddard is a 80 year old female who has noted gradual onset of droopy eyelids over the past years. The droopy eyelid is interfering with activities of daily living including driving, and reading. The patient denies double vision, variability of the eyelid position, or dry eye symptoms.     EXAM:     MRD1: 1 right eye a 0 left eye   Dermatochalasis with excess skin touching eyelashes   Severe left lower lid entropion, right lower eyelid ectropion, both upper eyelid ptosis, with high peaked crease left eye.     VISUAL FIELD:  Right eye untaped:15 degrees Right eye taped:50 degrees  Left eye untaped:15 degrees Left eye taped:50 degrees      Assessment & Plan     Leida Goddard is a 80 year old female with the following diagnoses:   1. Involutional ptosis, acquired, bilateral    2. Entropion of left lower eyelid    3. Pseudophakia of both eyes    4. Senile ectropion of right lower eyelid        Discussed options - could address upper lids and lower lids at same time, or address her acute issue - left lower eyelid entropion.    She would like to only address left lower eyelid entropion - plan: left lower lid lts + quickert      ANTICOAGULATION:    None         PHOTOS DEMONSTRATE:    Blepharoptosis, high peaked crease on left  Severe left lower eyelid entropion  Right lower eyelid ectropion    Brow ptosis with thicker brow skin and hairs below the lateral superior orbital rim    Attending Physician Attestation: Complete documentation of historical and exam elements from today's encounter can be found in the full encounter summary report (not reduplicated in this progress note). I personally obtained the chief complaint(s) and history of present illness. I confirmed and edited as necessary the review of systems, past medical/surgical history, family history, social history, and examination findings as documented by others; and I examined the patient myself. I personally reviewed the relevant tests, images, and reports as documented above. I formulated and edited as necessary the assessment and plan and discussed the findings and management plan with the patient. Susy Marie MD      Today with Leida Goddard I reviewed the indications, risks, benefits, and alternatives of the proposed surgical procedure including, but not limited to, failure obtain the desired result  and need for additional surgery, bleeding, infection, loss of vision, loss of the eye, and the remote possibility of permanent damage to any organ system or death with the use of anesthesia.  I provided multiple opportunities for the questions, answered all questions to the best of my ability, and confirmed that my answers and my discussion were understood.

## 2021-04-04 DIAGNOSIS — Z11.59 ENCOUNTER FOR SCREENING FOR OTHER VIRAL DISEASES: Primary | ICD-10-CM

## 2021-04-12 ENCOUNTER — TRANSFERRED RECORDS (OUTPATIENT)
Dept: HEALTH INFORMATION MANAGEMENT | Facility: CLINIC | Age: 81
End: 2021-04-12

## 2021-04-13 ASSESSMENT — MIFFLIN-ST. JEOR: SCORE: 1569.75

## 2021-04-15 DIAGNOSIS — Z11.59 ENCOUNTER FOR SCREENING FOR OTHER VIRAL DISEASES: ICD-10-CM

## 2021-04-15 LAB
LABORATORY COMMENT REPORT: NORMAL
SARS-COV-2 RNA RESP QL NAA+PROBE: NEGATIVE
SARS-COV-2 RNA RESP QL NAA+PROBE: NORMAL
SPECIMEN SOURCE: NORMAL
SPECIMEN SOURCE: NORMAL

## 2021-04-15 PROCEDURE — U0005 INFEC AGEN DETEC AMPLI PROBE: HCPCS | Performed by: OPHTHALMOLOGY

## 2021-04-15 PROCEDURE — U0003 INFECTIOUS AGENT DETECTION BY NUCLEIC ACID (DNA OR RNA); SEVERE ACUTE RESPIRATORY SYNDROME CORONAVIRUS 2 (SARS-COV-2) (CORONAVIRUS DISEASE [COVID-19]), AMPLIFIED PROBE TECHNIQUE, MAKING USE OF HIGH THROUGHPUT TECHNOLOGIES AS DESCRIBED BY CMS-2020-01-R: HCPCS | Performed by: OPHTHALMOLOGY

## 2021-04-18 ENCOUNTER — ANESTHESIA EVENT (OUTPATIENT)
Dept: SURGERY | Facility: AMBULATORY SURGERY CENTER | Age: 81
End: 2021-04-18

## 2021-04-19 ENCOUNTER — HOSPITAL ENCOUNTER (OUTPATIENT)
Facility: AMBULATORY SURGERY CENTER | Age: 81
Discharge: HOME | End: 2021-04-19
Attending: OPHTHALMOLOGY | Admitting: OPHTHALMOLOGY
Payer: COMMERCIAL

## 2021-04-19 ENCOUNTER — ANESTHESIA (OUTPATIENT)
Dept: SURGERY | Facility: AMBULATORY SURGERY CENTER | Age: 81
End: 2021-04-19

## 2021-04-19 VITALS
SYSTOLIC BLOOD PRESSURE: 143 MMHG | TEMPERATURE: 97.6 F | OXYGEN SATURATION: 98 % | WEIGHT: 242.06 LBS | RESPIRATION RATE: 16 BRPM | BODY MASS INDEX: 38.9 KG/M2 | DIASTOLIC BLOOD PRESSURE: 58 MMHG | HEIGHT: 66 IN

## 2021-04-19 DIAGNOSIS — H02.005 ENTROPION OF LEFT LOWER EYELID: ICD-10-CM

## 2021-04-19 LAB — GLUCOSE BLDC GLUCOMTR-MCNC: 135 MG/DL (ref 70–99)

## 2021-04-19 PROCEDURE — 67924 REPAIR EYELID DEFECT: CPT | Mod: E2

## 2021-04-19 PROCEDURE — G8918 PT W/O PREOP ORDER IV AB PRO: HCPCS

## 2021-04-19 PROCEDURE — 82962 GLUCOSE BLOOD TEST: CPT | Performed by: INTERNAL MEDICINE

## 2021-04-19 PROCEDURE — G8907 PT DOC NO EVENTS ON DISCHARG: HCPCS

## 2021-04-19 RX ORDER — SODIUM CHLORIDE, SODIUM LACTATE, POTASSIUM CHLORIDE, CALCIUM CHLORIDE 600; 310; 30; 20 MG/100ML; MG/100ML; MG/100ML; MG/100ML
INJECTION, SOLUTION INTRAVENOUS CONTINUOUS
Status: DISCONTINUED | OUTPATIENT
Start: 2021-04-19 | End: 2021-04-20 | Stop reason: HOSPADM

## 2021-04-19 RX ORDER — NEOMYCIN SULFATE, POLYMYXIN B SULFATE AND DEXAMETHASONE 3.5; 10000; 1 MG/ML; [USP'U]/ML; MG/ML
SUSPENSION/ DROPS OPHTHALMIC
Qty: 5 ML | Refills: 0 | Status: SHIPPED | OUTPATIENT
Start: 2021-04-19 | End: 2022-03-22

## 2021-04-19 RX ORDER — TETRACAINE HYDROCHLORIDE 5 MG/ML
SOLUTION OPHTHALMIC PRN
Status: DISCONTINUED | OUTPATIENT
Start: 2021-04-19 | End: 2021-04-19 | Stop reason: HOSPADM

## 2021-04-19 RX ORDER — ERYTHROMYCIN 5 MG/G
OINTMENT OPHTHALMIC PRN
Status: DISCONTINUED | OUTPATIENT
Start: 2021-04-19 | End: 2021-04-19 | Stop reason: HOSPADM

## 2021-04-19 RX ORDER — LIDOCAINE 40 MG/G
CREAM TOPICAL
Status: DISCONTINUED | OUTPATIENT
Start: 2021-04-19 | End: 2021-04-20 | Stop reason: HOSPADM

## 2021-04-19 RX ORDER — ACETAMINOPHEN 325 MG/1
975 TABLET ORAL ONCE
Status: COMPLETED | OUTPATIENT
Start: 2021-04-19 | End: 2021-04-19

## 2021-04-19 RX ORDER — PROPOFOL 10 MG/ML
INJECTION, EMULSION INTRAVENOUS PRN
Status: DISCONTINUED | OUTPATIENT
Start: 2021-04-19 | End: 2021-04-19

## 2021-04-19 RX ORDER — ERYTHROMYCIN 5 MG/G
OINTMENT OPHTHALMIC
Qty: 3.5 G | Refills: 0 | Status: SHIPPED | OUTPATIENT
Start: 2021-04-19 | End: 2022-03-22

## 2021-04-19 RX ORDER — LIDOCAINE HYDROCHLORIDE 20 MG/ML
INJECTION, SOLUTION INFILTRATION; PERINEURAL PRN
Status: DISCONTINUED | OUTPATIENT
Start: 2021-04-19 | End: 2021-04-19

## 2021-04-19 RX ADMIN — SODIUM CHLORIDE, SODIUM LACTATE, POTASSIUM CHLORIDE, CALCIUM CHLORIDE: 600; 310; 30; 20 INJECTION, SOLUTION INTRAVENOUS at 07:14

## 2021-04-19 RX ADMIN — ACETAMINOPHEN 975 MG: 325 TABLET ORAL at 06:57

## 2021-04-19 RX ADMIN — LIDOCAINE HYDROCHLORIDE 60 MG: 20 INJECTION, SOLUTION INFILTRATION; PERINEURAL at 07:19

## 2021-04-19 RX ADMIN — PROPOFOL 100 MG: 10 INJECTION, EMULSION INTRAVENOUS at 07:19

## 2021-04-19 NOTE — DISCHARGE INSTRUCTIONS
[facility]   Adult Discharge Orders & Instructions     For 24 hours after surgery    1. Get plenty of rest.  A responsible adult must stay with you for at least 24 hours after you leave the hospital.   2. Do not drive or use heavy equipment.  If you have weakness or tingling, don't drive or use heavy equipment until this feeling goes away.  3. Do not drink alcohol.  4. Avoid strenuous or risky activities.  Ask for help when climbing stairs.   5. You may feel lightheaded.  IF so, sit for a few minutes before standing.  Have someone help you get up.   6. If you have nausea (feel sick to your stomach): Drink only clear liquids such as apple juice, ginger ale, broth or 7-Up.  Rest may also help.  Be sure to drink enough fluids.  Move to a regular diet as you feel able.  7. You may have a slight fever. Call the doctor if your fever is over 100 F (37.7 C) (taken under the tongue) or lasts longer than 24 hours.  8. You may have a dry mouth, a sore throat, muscle aches or trouble sleeping.  These should go away after 24 hours.  9. Do not make important or legal decisions.     Call your doctor for any of the followin.  Signs of infection (fever, growing tenderness at the surgery site, a large amount of drainage or bleeding, severe pain, foul-smelling drainage, redness, swelling).    2. It has been over 8 to 10 hours since surgery and you are still not able to urinate (pass water).    3.  Headache for over 24 hours.    4.  Numbness, tingling or weakness the day after surgery (if you had spinal anesthesia).                  5. Signs of Covid-19 infection (temperature over 100 degrees, shortness of breath, cough, loss of taste/smell, generalized body aches, persistent headache,                  chills, sore throat, nausea/vomiting/diarrhea).    To contact Dr Marie call:  [phone] - Day  [phone] - After Hours, ask for the Opthomologist on call    Post-operative Instructions  Ophthalmic  Plastic and Reconstructive Surgery    Susy Marie M.D.     All instructions apply to the operated eye(s) or eyelid(s).    Wound care and personal care  ? Apply ice compresses 15 minutes of every hour while awake for 2 days. As long as there is no further bleeding, after two days, switch to warm water compresses for five minutes, four times a day until seen by your physician.   ? You may shower or wash your hair the day after surgery. Do not go swimming for at least 2 weeks to prevent contamination of your wounds.  ? You may go for walks and light activity is ok, but no heavy (over 15 pounds) lifting, bending or excessive straining for one week.   ? Do not apply make-up to the eyes or eyelids for 2 weeks after surgery.  ? Expect some swelling, bruising, black eye (even into the lower eyelids and cheeks). Also expect serum caking, crusting and discharge from the eye and/or incisions. A small amount of surface bleeding, and depending on the type of surgery, bleeding from the inside of the eyelid, is normal for the first 48 hours.  ? Avoid straining, bending at the waist, or lifting more than 15 pounds for 10 days. Activities that raise your blood pressure can worsen swelling, cause bleeding, and breaking of sutures. Like wise, sleeping with your head slightly elevated for the first several days can help swelling resolve more quickly.   ? Do continue to ambulate (walk) as you normally would - being sedentary after surgery can cause blood clots.   ? Your eye(s) and eyelid(s) may be painful and tender. This is normal after surgery.      Contact information and follow-up  ? Return to the Eye Clinic for a follow-up appointment with your physician as scheduled. If no appointment has been scheduled:   - [facility] eye clinic: [phone] for an appointment with Dr. Marie within 1 to 2 weeks from your date of surgery.      -  [facility] eye clinic: [phone] for an appointment with   Cynthia within 1 to 2 weeks from your date of surgery.   ? Only if you are scheduled for a phone or video visit for your first postoperative appointment, please e-mail pictures to [email or web address] 1-2 days before your appointment. If your visit is in person, you do not need to email photos.     ? For severe pain, bleeding, or loss of vision, call the [facility] at [phone] or [facility] at [phone].     After hours or on weekends and holidays, call [phone] and ask to speak with the ophthalmologist on call.    An on call person can be reached after hours for concerns. The on call doctor should not call in medication refill requests after hours or on weekends, so please plan accordingly. An effort has been made to provide adequate pain medications following every surgery, and refills will not be provided in most instances.     Activity restrictions and driving  ? Avoid heavy lifting, bending, exercise or strenuous activity for 1 week after surgery.  You may resume other activities and return to work as tolerated.  ? You may not resume driving if you are using narcotic pain medications (such as Oxycodone, Norco, Percocet, Tylenol #3).    Medications  ? Restart all regular home medications and eye drops. If you take Plavix or  Aspirin on a regular basis, wait for 72 hours after your surgery before restarting these in order to decrease the risk of bleeding complications.  ? Avoid aspirin and aspirin-like medications (Motrin, Aleve, Ibuprofen, [place] etc) for 72 hours to reduce the risk of bleeding. You may take Tylenol (acetaminophen) for pain.  ? In addition to your home medications, take the following post-operative medications as prescribed by your physician.    ? Apply antibiotic ointment to all sutures three times a day, and into the operated eye(s) at night.  ? Instill eye drops 3 times a day for 10 days.

## 2021-04-19 NOTE — ANESTHESIA POSTPROCEDURE EVALUATION
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    Anesthesia Type:  MAC    Note:  Disposition: Outpatient   Postop Pain Control: Uneventful            Sign Out: Well controlled pain   PONV: No   Neuro/Psych: Uneventful            Sign Out: Acceptable/Baseline neuro status   Airway/Respiratory: Uneventful            Sign Out: Acceptable/Baseline resp. status   CV/Hemodynamics: Uneventful            Sign Out: Acceptable CV status   Other NRE: NONE   DID A NON-ROUTINE EVENT OCCUR? No         Last vitals:  Vitals:    04/19/21 0617 04/19/21 0743 04/19/21 0800   BP: (!) 163/70 134/58 (!) 143/58   Resp: 18 16 16   Temp: 97.3  F (36.3  C) 97.6  F (36.4  C)    SpO2: 97% 97% 98%       Last vitals prior to Anesthesia Care Transfer:  CRNA VITALS  4/19/2021 0711 - 4/19/2021 0811      4/19/2021             Pulse:  55    SpO2:  96 %          Electronically Signed By: Damien Angel MD  April 19, 2021  8:34 AM

## 2021-04-19 NOTE — ANESTHESIA CARE TRANSFER NOTE
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    Anesthesia Type:   MAC     Note:    Oropharynx: oropharynx clear of all foreign objects  Level of Consciousness: awake  Oxygen Supplementation: room air    Independent Airway: airway patency satisfactory and stable  Dentition: dentition unchanged  Vital Signs Stable: post-procedure vital signs reviewed and stable  Report to RN Given: handoff report given  Patient transferred to: Phase II  Comments: To Phase II. Report to RN.  VSS Resp status stable.  Handoff Report: Identifed the Patient, Identified the Reponsible Provider, Reviewed the pertinent medical history, Discussed the surgical course, Reviewed Intra-OP anesthesia mangement and issues during anesthesia, Set expectations for post-procedure period and Allowed opportunity for questions and acknowledgement of understanding      Vitals: (Last set prior to Anesthesia Care Transfer)  CRNA VITALS  4/19/2021 0711 - 4/19/2021 0744      4/19/2021             Pulse:  55    SpO2:  96 %        Electronically Signed By: THAIS Oneal CRNA  April 19, 2021  7:44 AM

## 2021-04-19 NOTE — OP NOTE
PREOPERATIVE DIAGNOSIS: Entropion, left lower eyelid  POSTOPERATIVE DIAGNOSIS: Entropion, left lower eyelid  PROCEDURE: Entropion repair, left lower eyelid  ANESTHESIA: Monitored with local infiltration of a 50:50 mixture of 2% Lidocaine with epinephrine and 0.5% Marcaine  SURGEON: Susy Marie MD  ASSISTANT: None  COMPLICATIONS: None  ESTIMATED BLOOD LOSS: 2 mL  SPECIMENS: None  HISTORY AND INDICATIONS: Leida Goddard presented with entropion causing irritation and superficial keratitis. After the risks, benefits and alternatives were explained, informed consent was obtained.  PROCEDURE:  Leida Goddard  was brought to the operating room and placed supine on the operating table. IV sedation was given. The  left lower lid were infiltrated with local anesthetic. Lateral canthus was also infiltrated. The area was prepped and draped in the typical sterile ophthalmic fashion. Attention was directed to the left side. Lateral canthotomy and inferior cantholysis was performed with Srinivasan scissors. Lateral tarsal strip was fashioned. Three double-armed 6-0 vicryl sutures were placed in a Quickert fashion through the lower conjunctiva and lower lid retractors to exit just inferior to the lash line. These were left untied. Lateral tarsal strip was secured to the lateral orbital rim periosteum with a double-armed 5-0 vicryl suture in a horizontal mattress fashion. Lateral canthal angle was closed with a gray line to gray line suture of 5-0 vicryl suture. The 6-0 vicryl sutures were then tightened to create a slight eversion of the eyelid margin. Antibiotic ointment was applied to the incision and the eye.  Leida Goddard tolerated the procedure well and left the operating room in stable condition.      Susy Marie MD

## 2021-04-19 NOTE — ANESTHESIA PREPROCEDURE EVALUATION
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]   Procedure : Procedure(s):  REPAIR, ENTROPION     Past Medical History:   Diagnosis Date     Breast cancer (H) 2012    Left breast     CA - breast cancer 2013     Essential hypertension, benign     Hypertension, Benign     DALIA (obstructive sleep apnea)      Type II or unspecified type diabetes mellitus without mention of complication, not stated as uncontrolled     Diabetes mellitus      Past Surgical History:   Procedure Laterality Date     C ANESTH,BLEPHAROPLASTY       CHOLECYSTECTOMY  1966     HYSTERECTOMY TOTAL ABDOMINAL, BILATERAL SALPINGO-OOPHORECTOMY, COMBINED      age 43     LUMPECTOMY BREAST  13    Left breast     SPINE SURGERY      Lumbar laminectomy      Allergies   Allergen Reactions     Metformin Nausea and Vomiting     Adhesive Tape Hives     Augmentin Diarrhea     Doxycycline Nausea and Vomiting     Food      Lactose Intolerant     Hctz Rash     Lisinopril Cough      Social History     Tobacco Use     Smoking status: Never Smoker     Smokeless tobacco: Never Used   Substance Use Topics     Alcohol use: No      Wt Readings from Last 1 Encounters:   21 109.8 kg (242 lb 1 oz)        Anesthesia Evaluation   Pt has had prior anesthetic. Type: General.    No history of anesthetic complications       ROS/MED HX  ENT/Pulmonary:     (+) sleep apnea, uses CPAP,     Neurologic:  - neg neurologic ROS     Cardiovascular:     (+) Dyslipidemia hypertension-----    METS/Exercise Tolerance:     Hematologic:  - neg hematologic  ROS     Musculoskeletal:  - neg musculoskeletal ROS     GI/Hepatic:  - neg GI/hepatic ROS     Renal/Genitourinary:  - neg Renal ROS     Endo:     (+) type II DM, Not using insulin, thyroid problem, hypothyroidism, Obesity,     Psychiatric/Substance Use:  - neg psychiatric ROS     Infectious Disease:  - neg  infectious disease ROS     Malignancy:   (+) Malignancy, History of Breast.Breast CA Remission status post Surgery.        Other:  - neg other ROS          Physical Exam    Airway        Mallampati: I       Respiratory Devices and Support         Dental       (+) upper dentures and lower dentures      Cardiovascular   cardiovascular exam normal          Pulmonary   pulmonary exam normal                OUTSIDE LABS:  CBC:   Lab Results   Component Value Date    WBC 8.5 01/29/2013    HGB 14.1 01/29/2013    HCT 40.5 01/29/2013     01/29/2013     BMP:   Lab Results   Component Value Date     10/21/2014     01/29/2013    POTASSIUM 3.9 10/21/2014    POTASSIUM 4.7 01/29/2013    CHLORIDE 107 10/21/2014    CHLORIDE 101 01/29/2013    CO2 27 10/21/2014    CO2 25 01/29/2013    BUN 16 10/21/2014    BUN 15 01/29/2013    CR 0.68 10/21/2014    CR 0.64 01/29/2013     (H) 10/21/2014     (H) 01/29/2013     COAGS: No results found for: PTT, INR, FIBR  POC: No results found for: BGM, HCG, HCGS  HEPATIC:   Lab Results   Component Value Date    ALBUMIN 3.9 11/13/2019    PROTTOTAL 7.1 11/13/2019    ALT 21 11/13/2019    AST 15 11/13/2019    ALKPHOS 83 11/13/2019    BILITOTAL 0.6 11/13/2019     OTHER:   Lab Results   Component Value Date    JAZMYNE 8.7 11/14/2017    TSH 5.85 (H) 11/09/2012    T4 1.08 11/09/2012       Anesthesia Plan    ASA Status:  3   NPO Status:  NPO Appropriate    Anesthesia Type: MAC.     - Reason for MAC: chronic cardiopulmonary disease   Induction: Intravenous, Propofol.   Maintenance: TIVA.        Consents    Anesthesia Plan(s) and associated risks, benefits, and realistic alternatives discussed. Questions answered and patient/representative(s) expressed understanding.     - Discussed with:  Patient      - Extended Intubation/Ventilatory Support Discussed: No.      - Patient is DNR/DNI Status: No    Use of blood products discussed: No .     Postoperative Care    Post procedure pain  management: None required.   PONV prophylaxis: Ondansetron (or other 5HT-3), Dexamethasone or Solumedrol, Background Propofol Infusion     Comments:                Damien Angel MD

## 2021-05-05 ENCOUNTER — OFFICE VISIT (OUTPATIENT)
Dept: OPHTHALMOLOGY | Facility: CLINIC | Age: 81
End: 2021-05-05
Payer: COMMERCIAL

## 2021-05-05 DIAGNOSIS — H02.005 ENTROPION OF LEFT LOWER EYELID: Primary | ICD-10-CM

## 2021-05-05 PROCEDURE — 99024 POSTOP FOLLOW-UP VISIT: CPT | Performed by: OPHTHALMOLOGY

## 2021-05-05 ASSESSMENT — VISUAL ACUITY
OS_SC+: -1
OD_SC: 20/25
OD_SC+: -2
METHOD: SNELLEN - LINEAR
OS_SC: 20/50

## 2021-05-05 ASSESSMENT — TONOMETRY
OD_IOP_MMHG: 13
IOP_METHOD: ICARE
OS_IOP_MMHG: 18

## 2021-05-05 NOTE — NURSING NOTE
Chief Complaints and History of Present Illnesses   Patient presents with     Post Op (Ophthalmology) Both Eyes       Chief Complaint(s) and History of Present Illness(es)     Post Op (Ophthalmology) Both Eyes     Laterality: left eye              Comments     S/P Entropion repair, left lower eyelid, 04/19/2021. Patient notes some tenderness surrounding left eye, especially when squeezing eyes shut. Still using erythromycin twice daily. Patient is very happy with results.                 Gilbert Mcneill, Ophthalmic Assistant

## 2021-05-05 NOTE — PROGRESS NOTES
Chief Complaint(s) and History of Present Illness(es)     Post Op (Ophthalmology) Both Eyes     Laterality: left eye              Comments     S/P Entropion repair, left lower eyelid, 04/19/2021. Patient notes some   tenderness surrounding left eye, especially when squeezing eyes shut.   Still using erythromycin twice daily. Patient is very happy with results.          Doing well postop left entropion repair. Happy with outcome.     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 History of lid surgery with Dr. Rojas x 2 maybe 10 years ago. She is not interested in addressing at this time.     - Vaseline to incision qhs  - continue warm packs  F/u with me as needed. F/u with Dr. Dunphy for annual eye exam.     Attending Physician Attestation: Complete documentation of historical and exam elements from today's encounter can be found in the full encounter summary report (not reduplicated in this progress note). I personally obtained the chief complaint(s) and history of present illness. I confirmed and edited as necessary the review of systems, past medical/surgical history, family history, social history, and examination findings as documented by others; and I examined the patient myself. I personally reviewed the relevant tests, images, and reports as documented above. I formulated and edited as necessary the assessment and plan and discussed the findings and management plan with the patient and family. I personally reviewed the ophthalmic test(s) associated with this encounter, agree with the interpretation(s) as documented by the resident/fellow, and have edited the corresponding report(s) as necessary. Susy Marie MD

## 2021-05-05 NOTE — LETTER
5/5/2021         RE: Leida Goddard  [address] [address] [place] [address] [address]  [facility] [id number]        Dear Dr. Dunphy,    Thank you for referring your patient, Leida Goddard, to the [facility]. Please see a copy of my visit note below.    Chief Complaint(s) and History of Present Illness(es)     Post Op (Ophthalmology) Both Eyes     Laterality: left eye              Comments     S/P Entropion repair, left lower eyelid, 04/19/2021. Patient notes some   tenderness surrounding left eye, especially when squeezing eyes shut.   Still using erythromycin twice daily. Patient is very happy with results.          Doing well postop left entropion repair. Happy with outcome.     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 History of lid surgery with Dr. Rojas x 2 maybe 10 years ago. She is not interested in addressing at this time.     - Vaseline to incision qhs  - continue warm packs  F/u with me as needed. F/u with Dr. Dunphy for annual eye exam.     Attending Physician Attestation: Complete documentation of historical and exam elements from today's encounter can be found in the full encounter summary report (not reduplicated in this progress note). I personally obtained the chief complaint(s) and history of present illness. I confirmed and edited as necessary the review of systems, past medical/surgical history, family history, social history, and examination findings as documented by others; and I examined the patient myself. I personally reviewed the relevant tests, images, and reports as documented above. I formulated and edited as necessary the assessment and plan and discussed the findings and management plan with the patient and family. I personally reviewed the ophthalmic test(s) associated with this encounter, agree with the interpretation(s) as documented by the resident/fellow, and have edited the corresponding report(s) as necessary. Susy Marie MD          Again, thank you  for allowing me to participate in the care of your patient.        Sincerely,      Susy Marie MD    Oculoplastic and Orbital Surgery   Department of Ophthalmology and Visual Neurosciences  [facility]

## 2021-07-02 ENCOUNTER — TELEPHONE (OUTPATIENT)
Dept: GASTROENTEROLOGY | Facility: CLINIC | Age: 81
End: 2021-07-02

## 2021-07-02 NOTE — TELEPHONE ENCOUNTER
[facility] Call Center    Phone Message    May a detailed message be left on voicemail: no     Reason for Call: Other: Pt has return appt 9.28.21.  She has too much pain to wait that long.  Has chronic diarrhea, pain on the right side.  Pt asking to get in sooner.  She does not have access to video visits.  Please call back.   bisi Edwards,   Action Taken: Message routed to:  Adult Clinics: Gastroenterology (GI) p [id number]    Travel Screening: Not Applicable

## 2021-07-02 NOTE — TELEPHONE ENCOUNTER
Patient returned call, states that she has decided to see her PCP, and requests that all scheduled appointments be cancelled.      Flaquita An RN

## 2021-07-02 NOTE — TELEPHONE ENCOUNTER
Contacted patient, she has been doctoring with a stomach doctor in [place], but she doesn't want to go that far anymore.  She had a colonoscopy and had some polyps removed, but otherwise normal, no colitis shown.  She has had diarrhea for several years.      Patient states she also had a fructose test done and she is intolerant to fructose as well as lactose.  About a week or so ago, she started having indigestion and a lot of acid reflux.  Had a pressure in her chest and went to Dr. Oconnor, internal medicine, who increased her Prilosec to 40 mg twice daily. Patient reports that her stomach hurts, still has diarrhea and a pain on her right side.  She also has a back problem.  She gets nauseated and sometimes has a decreased appetite.  States she had an endoscopy done several years ago and had H.Pylori, her symptoms now feel very similar to that.  She was treated with antibiotics at that time.      Offered telephone visit with provider on Monday, 7/5/2021 at 8 AM with Zuleyka Harkins PA-C.  Patient states that she does not like telephone visits, prefers in person.  She is going to check around to see if she can be seen elsewhere in person sooner, but will otherwise take the telephone visit.  She will return call if she would like to keep the telephone visit.  Advised that the appointment will be held until noon, patient verbalizes understanding.      Message to provider to review/advise.      Flaquita An RN

## 2022-03-21 ENCOUNTER — TELEPHONE (OUTPATIENT)
Dept: DERMATOLOGY | Facility: CLINIC | Age: 82
End: 2022-03-21
Payer: COMMERCIAL

## 2022-03-21 NOTE — TELEPHONE ENCOUNTER
[facility] Call Center    Phone Message    May a detailed message be left on voicemail: yes     Reason for Call: Symptoms or Concerns     If patient has red-flag symptoms, warm transfer to triage line    Current symptom or concern: Spot on arm that is concerning     Symptoms have been present for:  2 month(s)    Has patient previously been seen for this? No    By : NA    Date: NA    Are there any new or worsening symptoms? No - Pt is concern this spot is big.  The spot feels rough and dark with  reddish  Spot inside.   Please call Pt to discuss. She was not comfortable waiting to schedule in August. Thanks      Action Taken: Message routed to:  [facility] Derm    Travel Screening: Not Applicable

## 2022-03-21 NOTE — TELEPHONE ENCOUNTER
Leida has a dark brown lesion on her left arm that has been present x 3 months.  She is concerned due to the discoloration.  It does not bleed or itch.    I scheduled her with Dr. Yang on 3/22/22.  Karla Ruiz RN

## 2022-03-22 ENCOUNTER — OFFICE VISIT (OUTPATIENT)
Dept: DERMATOLOGY | Facility: CLINIC | Age: 82
End: 2022-03-22
Payer: COMMERCIAL

## 2022-03-22 DIAGNOSIS — L82.1 SEBORRHEIC KERATOSES: Primary | ICD-10-CM

## 2022-03-22 DIAGNOSIS — L20.84 INTRINSIC ECZEMA: ICD-10-CM

## 2022-03-22 PROCEDURE — 99204 OFFICE O/P NEW MOD 45 MIN: CPT | Performed by: DERMATOLOGY

## 2022-03-22 RX ORDER — TRIAMCINOLONE ACETONIDE 1 MG/G
CREAM TOPICAL
Qty: 80 G | Refills: 11 | Status: SHIPPED | OUTPATIENT
Start: 2022-03-22

## 2022-03-22 ASSESSMENT — PAIN SCALES - GENERAL: PAINLEVEL: NO PAIN (0)

## 2022-03-22 NOTE — LETTER
3/22/2022         RE: Leida Goddard  [address] [address] [place] [address] [address]  [facility] [address]        Dear Colleague,    Thank you for referring your patient, Leida Goddard, to the [facility]. Please see a copy of my visit note below.    [facility] Dermatology Note  Encounter Date: Mar 22, 2022  Office Visit     Dermatology Problem List:  1. Actinic cheilitis, cryo  2. Eczematous dermatitis, posterior neck/upper back  - current tx: TAC 0.1% cream, mositurizer, gentle soap    Family History: Negative for skin cancer.  Social History: Retired.  ____________________________________________    ASSESSMENT/PLAN:    # Benign findings include: seborrheic keratoses. Self limited, minor.   - No further intervention required. Patient to report changes.  - Patient reassured of the benign nature of these lesions.    # Eczema, intrinsic. Chronic, flared. Discussed the pathogenesis of this condition and empathized the importance of gentle skin care and cream based moisturizer for long term treatment. Discussed use of topical steroid only when skin is itchy.    - Apply TAC 0.1% cream BID PRN (up to 2 weeks on, 1 week off max, if needed less than that PRN).    Procedures Performed:   None.    Follow-up: prn for new or changing lesions    Staff and Scribe:     Scribe Disclosure:   I, Chaim Krishnan, am serving as a scribe to document services personally performed by this physician, Dr. Sara Yang, based on data collection and the provider's statements to me.     Provider Disclosure:   The documentation recorded by the scribe accurately reflects the services I personally performed and the decisions made by me.    Sara Yang MD    Department of Dermatology  [facility]: Phone: [phone], Fax:[phone]  [facility]: Phone: [phone], Fax:  [phone]    ____________________________________________    CC: Derm Problem (Leida is here today for spot check on left upper arm and back)    HPI:  Ms. Leida Goddard is a(n) 81 year old female who presents today as a new patient for a spot check.    She is technically new today. Saw Dr. Walsh for skin check and actinic cheilitis 6/2017.     Self referred. Called for appointment yesterday due to concerns on the arm that have been present for 2 months.    Today, patient notes concerns about a lesion on the left upper arm and back. She recalls being told in the past that these were benign, but wants to confirm they are not worrisome. She's been using hydrocortisone to the back. Itchy for last 4-5 weeks.    Patient is otherwise feeling well, without additional concerns.    Labs:  NA    Physical exam:  Vitals: There were no vitals taken for this visit.  SKIN: Focused examination of the arms and back was performed.  - Eczematous dermatitis on the posterior neck and upper back  - There are waxy stuck on tan to brown papules on the left upper arm, central back, right upper arm.  - No other lesions of concern on areas examined.     Medications:  Current Outpatient Medications   Medication     calcium citrate-vitamin D (CITRACAL) 315-200 MG-UNIT TABS per tablet     escitalopram (LEXAPRO) 10 MG tablet     fluticasone (FLONASE) 50 MCG/ACT nasal spray     furosemide (LASIX) 40 MG tablet     LEVOTHYROXINE SODIUM 25 MCG OR TABS     losartan (COZAAR) 100 MG tablet     magnesium 250 MG tablet     omeprazole (PRILOSEC) 20 MG DR capsule     potassium chloride ER (K-DUR/KLOR-CON M) 20 MEQ CR tablet     potassium chloride SA (K-DUR,KLOR-CON M) 20 MEQ tablet     PROPRANOLOL HCL PO     vitamin D2 (ERGOCALCIFEROL) 41753 units (1250 mcg) capsule     econazole nitrate 1 % cream     erythromycin (ROMYCIN) 5 MG/GM ophthalmic ointment     glipiZIDE (GLUCOTROL) 5 MG tablet     Liraglutide (VICTOZA SC)      neomycin-polymyxin-dexamethasone (MAXITROL) 3.5-22389-3.1 SUSP ophthalmic susp     rosuvastatin (CRESTOR) 10 MG tablet     SB LOW DOSE ASA EC 81 MG OR TBEC     TRAMADOL HCL PO     No current facility-administered medications for this visit.      Past Medical History:   Patient Active Problem List   Diagnosis     Diabetes mellitus (H)     DALIA (obstructive sleep apnea)     HBP (high blood pressure)     Cushing's syndrome (H)     Hypothyroidism     Cancer of breast (H)     S/P total knee arthroplasty: lt     S/P total hip arthroplasty: Rt     Anemia due to blood loss, acute     Edema     Hypercholesterolemia     Malignant neoplasm of overlapping sites of left breast in female, estrogen receptor positive (H)     Entropion of left lower eyelid     Past Medical History:   Diagnosis Date     Breast cancer (H) 12/7/2012    Left breast     CA - breast cancer 2/7/2013     Essential hypertension, benign     Hypertension, Benign     DALIA (obstructive sleep apnea)      Type II or unspecified type diabetes mellitus without mention of complication, not stated as uncontrolled     Diabetes mellitus        CC No referring provider defined for this encounter. on close of this encounter.      Again, thank you for allowing me to participate in the care of your patient.        Sincerely,        Sara Yang MD

## 2022-03-22 NOTE — NURSING NOTE
Leida Goddard's goals for this visit include:   Chief Complaint   Patient presents with     Derm Problem     Leida is here today for spot check on left upper arm and back       She requests these members of her care team be copied on today's visit information:     PCP: Samantha Grissom    Referring Provider:  No referring provider defined for this encounter.    There were no vitals taken for this visit.    Do you need any medication refills at today's visit? No    Marcie Baptiste LPN

## 2022-03-22 NOTE — PROGRESS NOTES
[facility] Dermatology Note  Encounter Date: Mar 22, 2022  Office Visit     Dermatology Problem List:  1. Actinic cheilitis, cryo  2. Eczematous dermatitis, posterior neck/upper back  - current tx: TAC 0.1% cream, mositurizer, gentle soap    Family History: Negative for skin cancer.  Social History: Retired.  ____________________________________________    ASSESSMENT/PLAN:    # Benign findings include: seborrheic keratoses. Self limited, minor.   - No further intervention required. Patient to report changes.  - Patient reassured of the benign nature of these lesions.    # Eczema, intrinsic. Chronic, flared. Discussed the pathogenesis of this condition and empathized the importance of gentle skin care and cream based moisturizer for long term treatment. Discussed use of topical steroid only when skin is itchy.    - Apply TAC 0.1% cream BID PRN (up to 2 weeks on, 1 week off max, if needed less than that PRN).    Procedures Performed:   None.    Follow-up: prn for new or changing lesions    Staff and Scribe:     Scribe Disclosure:   I, Chaim Krishnan, am serving as a scribe to document services personally performed by this physician, Dr. aSra Yang, based on data collection and the provider's statements to me.     Provider Disclosure:   The documentation recorded by the scribe accurately reflects the services I personally performed and the decisions made by me.    Sara Yang MD    Department of Dermatology  [facility]: Phone: [phone], Fax:[phone]  [facility]: Phone: [phone], Fax: [phone]    ____________________________________________    CC: Derm Problem (Leida is here today for spot check on left upper arm and back)    HPI:  Ms. Leida Goddard is a(n) 81 year old female who presents today as a new patient for a spot check.    She is  technically new today. Saw Dr. Walsh for skin check and actinic cheilitis 6/2017.     Self referred. Called for appointment yesterday due to concerns on the arm that have been present for 2 months.    Today, patient notes concerns about a lesion on the left upper arm and back. She recalls being told in the past that these were benign, but wants to confirm they are not worrisome. She's been using hydrocortisone to the back. Itchy for last 4-5 weeks.    Patient is otherwise feeling well, without additional concerns.    Labs:  NA    Physical exam:  Vitals: There were no vitals taken for this visit.  SKIN: Focused examination of the arms and back was performed.  - Eczematous dermatitis on the posterior neck and upper back  - There are waxy stuck on tan to brown papules on the left upper arm, central back, right upper arm.  - No other lesions of concern on areas examined.     Medications:  Current Outpatient Medications   Medication     calcium citrate-vitamin D (CITRACAL) 315-200 MG-UNIT TABS per tablet     escitalopram (LEXAPRO) 10 MG tablet     fluticasone (FLONASE) 50 MCG/ACT nasal spray     furosemide (LASIX) 40 MG tablet     LEVOTHYROXINE SODIUM 25 MCG OR TABS     losartan (COZAAR) 100 MG tablet     magnesium 250 MG tablet     omeprazole (PRILOSEC) 20 MG DR capsule     potassium chloride ER (K-DUR/KLOR-CON M) 20 MEQ CR tablet     potassium chloride SA (K-DUR,KLOR-CON M) 20 MEQ tablet     PROPRANOLOL HCL PO     vitamin D2 (ERGOCALCIFEROL) 13285 units (1250 mcg) capsule     econazole nitrate 1 % cream     erythromycin (ROMYCIN) 5 MG/GM ophthalmic ointment     glipiZIDE (GLUCOTROL) 5 MG tablet     Liraglutide (VICTOZA SC)     neomycin-polymyxin-dexamethasone (MAXITROL) 3.5-36468-2.1 SUSP ophthalmic susp     rosuvastatin (CRESTOR) 10 MG tablet     SB LOW DOSE ASA EC 81 MG OR TBEC     TRAMADOL HCL PO     No current facility-administered medications for this visit.      Past Medical History:   Patient Active Problem List    Diagnosis     Diabetes mellitus (H)     DALIA (obstructive sleep apnea)     HBP (high blood pressure)     Cushing's syndrome (H)     Hypothyroidism     Cancer of breast (H)     S/P total knee arthroplasty: lt     S/P total hip arthroplasty: Rt     Anemia due to blood loss, acute     Edema     Hypercholesterolemia     Malignant neoplasm of overlapping sites of left breast in female, estrogen receptor positive (H)     Entropion of left lower eyelid     Past Medical History:   Diagnosis Date     Breast cancer (H) 12/7/2012    Left breast     CA - breast cancer 2/7/2013     Essential hypertension, benign     Hypertension, Benign     DALIA (obstructive sleep apnea)      Type II or unspecified type diabetes mellitus without mention of complication, not stated as uncontrolled     Diabetes mellitus        CC No referring provider defined for this encounter. on close of this encounter.

## 2022-03-22 NOTE — PATIENT INSTRUCTIONS
For a body soap in the shower, I recommend Dove bar soap, unscented for sensitive skin. For a liquid soap, I recommend Cetaphil gentle facial cleanser or Vanicream gentle facial cleanser.     If your skin is itchy, apply the topical steroid triamcinolone twice a day on the skin that is itchy and then apply the moisturizer. You can use this twice daily for up to two weeks at a time. If after two weeks, the rash is still present, you should then begin using it one week on, one week off. If the skin is not itchy, you can stop using the triamcinolone.    Every morning and night (even if you are not itchy), apply a moisturizer. You should apply this after getting out of the shower, preferably on your entire body. I recommend a cream based moisturizer, like CeraVe, Vanicream or Cetaphil.

## 2022-09-28 ENCOUNTER — TRANSCRIBE ORDERS (OUTPATIENT)
Dept: OTHER | Age: 82
End: 2022-09-28

## 2022-09-28 DIAGNOSIS — C50.411 MALIGNANT NEOPLASM OF UPPER-OUTER QUADRANT OF RIGHT FEMALE BREAST (H): Primary | ICD-10-CM

## 2022-10-07 NOTE — LETTER
5/15/2018         RE: Leida Goddard  [address] [address] [place] [address] [address]   [facility] [address]        Dear Colleague,    Thank you for referring your patient, Leida Goddard, to the [facility]. Please see a copy of my visit note below.    Visit Date:   05/15/2018      DATE OF VISIT:  05/15/2018      ONCOLOGY DIAGNOSIS: January 2013: Stage I, T1a NX multifocal invasive lobular carcinoma of the left breast. Final pathology showed 4 foci of invasive lobular carcinoma, largest measuring 0.11 cm. There was also a 0.1 cm and 0.08 cm and 0.05 cm. Lymph nodes were not examined. Lymphovascular invasion not identified. Estrogen receptor positive, progesterone receptor positive, HER-2 non-amplified by FISH.       THERAPY TO DATE:   1. January 7, 2013. Left breast seed localized excisional biopsy.   2. February 2013- April 2013. Radiotherapy to left breast with 5040 cGy and 1000 centigrade tumor bed boost.   3. April 2013 to Present. Adjuvant anastrozole 1 mg p.o. daily.      INTERVAL HISTORY:  Leida is an 78-year-old female diagnosed with stage I, pT1a NX multifocal invasive lobular carcinoma, grade 2 with LCIS of the left breast in 01/2013 after found to have calcifications on routine mammogram.  The patient presents to clinic for followup of her malignancy.  The patient states that since I last saw her, she underwent a left hip replacement.  Has been experiencing generalized body aches and bone aches and has limited her ability to get around.  She did try the Fosamax for her osteopenia, but she did not tolerate this well and stopped.  She is hoping to come off of the Arimidex soon.  Does have some generalized aches.  No fevers, chills, nausea, vomiting, chest pain, shortness of breath, or cough.  Does have loose stools.  No abdominal discomfort.  No new palpable masses and the remainder of a comprehensive review of systems is negative.      SOCIAL HISTORY:  .  Retired.  No current tobacco use.   Usually likes to get out to the casino, but has not been able to do much because she has some generalized aches. Lives by herself and drives herself.     PHYSICAL EXAMINATION:   VITAL SIGNS:  Blood pressure 153/75, pulse 57, respirations 15, temperature 98, pulse ox 97% on room air, weight 232 pounds, BMI 36.89.   GENERAL:  Comfortable, in no acute distress.   HEENT:  Atraumatic, normocephalic.  Pupils equal, round, and reactive.  Sclerae are anicteric.  Oropharynx:  Moist mucous membranes.  No lesions, ulcers or exudate.   NECK:  Supple, full range of motion.  Trachea midline.   HEART:  Regular rate and rhythm, normal S1, S2.   LUNGS:  Clear to auscultation.  No crackles or wheezes.  Normal respiratory effort.   ABDOMEN:  Positive bowel sounds, soft and nontender.   EXTREMITIES:  No cyanosis, warm.   MUSCULOSKELETAL:  No point tenderness.   LYMPHATICS:  No cervical, supraclavicular or axillary nodes.   SKIN:  No petechiae or rashes.   NEUROLOGIC:  Alert and oriented.   BREASTS:  Examined in the upright and supine position.  Right breast:  No dominant masses. Nipple everted without discharge. Left breast: Radiation changes with surgical defect in the lower inner quadrant.  Nipple without discharge.        Mammogram from 11/2016 shows heterogeneously dense breasts.  Lumpectomy scar is noted in the left medial breast, but there is no evidence for malignancy.  No change compared to prior study.       DEXA scan from 09/18/2017 shows osteoporosis.      ASSESSMENT AND PLAN:   1.  Stage I, pT1a NX multifocal invasive lobular carcinoma with lobular carcinoma in situ of the left breast.  No evidence of recurrence by history or physical.  The patient has now completed 5 years of Arimidex.  She is experiencing generalized aches and pains.  At the age of 78, I do not feel she requires any further anastrozole.  Will discontinue.  The patient will return to see us in 1 year with LFTs.   2.  Osteoporosis.  The patient tried  Fosamax but did not tolerate this well.  At this point, she does not want to be on any form of bisphosphonate.  Will continue to discuss this with her primary.  Patient is also now coming off the Arimidex, which increased her risk.   3.  History of pulmonary nodules.  CT scan showed 2 years of stability.   4. Dyspnea. Reported on previous visit has improved with her weight loss.    4.  Health care maintenance.  Discussed the importance of a healthy lifestyle and diet.  The patient is limited in how much she can do because of her aches.  Hoping that with discontinuing the Arimidex, she will be able to be a little more active.        ERICK QUEEN MD             D: 05/15/2018   T: 05/15/2018   MT: HERNANDEZ      Name:     LINDA BARRIENTOS   MRN:      [id number]        Account:      [id number]   :      1940           Visit Date:   05/15/2018      Document: [id number]       cc: Samantha Grissom MD       Again, thank you for allowing me to participate in the care of your patient.        Sincerely,        Erick Queen MD     [employer] dose 1, 2, and 3

## 2022-10-11 NOTE — PROGRESS NOTES
RECORDS STATUS - BREAST    RECORDS REQUESTED FROM: [facility]/ [facility] / [facility]    DATE REQUESTED: 10/13/2022    malignant neoplasm of upper-outer quadrant of right female breast    Action    Action Taken 10/11/2022 3:50pm DONOVAN  I called [facility]'s IMG Dept Ph: [phone] - unavailable.     10/12/2022 9:45AM VIVIENB   [facility] said to call [facility]     I called [facility] - they will push scans to [place] PACS    10:02AM DONOVAN   I called our internal film file room- Seun will resolve the breast imaging in PACS      NOTES DETAILS STATUS   OFFICE NOTE from referring provider     OFFICE NOTE from medical oncologist [facility] 11/13/2019    OFFICE NOTE from surgeon Complete See Breast Biopsy in EPIC   OFFICE NOTE from radiation oncologist     DISCHARGE SUMMARY from hospital     DISCHARGE REPORT from the ER     OPERATIVE REPORT Complete See Breast Biopsy in EPIC   MEDICATION LIST Complete [facility]   CLINICAL TRIAL TREATMENTS TO DATE     LABS     REQUEST BLOCKS FOR ALL BREAST CANCER PTS     PATHOLOGY REPORTS  (Tissue diagnosis, Stage, ER/KS percentage positive and intensity of staining, HER2 IHC, FISH, and all biopsies from breast and any distant metastasis)                 External - See Breast Biopsy in [facility]    Requested- [facility] Ph:[phone]   Fax: [phone]  [employer] tracking Number: [id number] 9/16/2022   Case: [id number]      A.  Right breast tissue, radioactive seed guided lumpectomy-Invasive ductal carcinoma, involving inferior and medial margins.  B.  Right breast inferior medial margin, excision-Invasive ductal carcinoma.  New inferior and medial margins are free.    2/8/2022   Case: [id number]  Right parotid gland, ultrasound-guided core biopsy - Warthin tumor   GENONOMIC TESTING     TYPE:   (Next Generation Sequencing, including Foundation One testing, and Oncotype score)     IMAGING (NEED IMAGES & REPORT)     CT SCANS Complete - [facility]  CT Chest 2020    MRI      MAMMO Complete- [facility]  11/13/2019, 5/15/2018     9/16/2022,    ULTRASOUND Complete - [facility]  US Breast 9/16/2022    PET     BONE SCAN     BRAIN MRI

## 2022-10-13 ENCOUNTER — ONCOLOGY VISIT (OUTPATIENT)
Dept: ONCOLOGY | Facility: CLINIC | Age: 82
End: 2022-10-13
Payer: COMMERCIAL

## 2022-10-13 ENCOUNTER — PRE VISIT (OUTPATIENT)
Dept: ONCOLOGY | Facility: CLINIC | Age: 82
End: 2022-10-13

## 2022-10-13 VITALS
DIASTOLIC BLOOD PRESSURE: 79 MMHG | HEART RATE: 69 BPM | TEMPERATURE: 97.9 F | OXYGEN SATURATION: 97 % | SYSTOLIC BLOOD PRESSURE: 169 MMHG

## 2022-10-13 DIAGNOSIS — C50.911 INVASIVE DUCTAL CARCINOMA OF BREAST, FEMALE, RIGHT (H): Primary | ICD-10-CM

## 2022-10-13 LAB
ALBUMIN SERPL-MCNC: 3.9 G/DL (ref 3.4–5)
ALP SERPL-CCNC: 54 U/L (ref 40–150)
ALT SERPL W P-5'-P-CCNC: 23 U/L (ref 0–50)
ANION GAP SERPL CALCULATED.3IONS-SCNC: 2 MMOL/L (ref 3–14)
AST SERPL W P-5'-P-CCNC: 16 U/L (ref 0–45)
BILIRUB SERPL-MCNC: 0.5 MG/DL (ref 0.2–1.3)
BUN SERPL-MCNC: 15 MG/DL (ref 7–30)
CALCIUM SERPL-MCNC: 8.7 MG/DL (ref 8.5–10.1)
CHLORIDE BLD-SCNC: 110 MMOL/L (ref 94–109)
CO2 SERPL-SCNC: 30 MMOL/L (ref 20–32)
CREAT SERPL-MCNC: 0.7 MG/DL (ref 0.52–1.04)
GFR SERPL CREATININE-BSD FRML MDRD: 85 ML/MIN/1.73M2
GLUCOSE BLD-MCNC: 125 MG/DL (ref 70–99)
HOLD SPECIMEN: NORMAL
HOLD SPECIMEN: NORMAL
POTASSIUM BLD-SCNC: 4 MMOL/L (ref 3.4–5.3)
PROT SERPL-MCNC: 7.3 G/DL (ref 6.8–8.8)
SODIUM SERPL-SCNC: 142 MMOL/L (ref 133–144)

## 2022-10-13 PROCEDURE — 99204 OFFICE O/P NEW MOD 45 MIN: CPT | Performed by: INTERNAL MEDICINE

## 2022-10-13 PROCEDURE — 36415 COLL VENOUS BLD VENIPUNCTURE: CPT | Performed by: INTERNAL MEDICINE

## 2022-10-13 PROCEDURE — 80053 COMPREHEN METABOLIC PANEL: CPT | Performed by: INTERNAL MEDICINE

## 2022-10-13 NOTE — PROGRESS NOTES
[facility]    Hematology/Oncology New Patient Note      Today's Date: 10/13/2022    Reason for Consultation: RIGHT breast cancer  Referring Provider: Dr. Mervat Joseph      HISTORY OF PRESENT ILLNESS: Leida Goddard is a 82 year old female who was referred to the Hematology/Oncology Clinic for RIGHT breast cancer.    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    Patient's previous work-up was done at [facility].  I have reviewed the records in care everywhere and they are summarized below.    Regarding LEFT breast cancer 2012:   -2012-screening mammogram showed grouped calcifications in the left breast at 7:00 posterior depth, increased in number, confirmed on ultrasound  - 2012-stereotactic biopsy with clip placement, pathology showed LCIS with fibroadenomatoid changes with microcalcifications  - 2013- left breast lumpectomy with pathology showin microscopic foci of invasive lobular carcinoma, largest focus 0.11 cm others 0.1 cm, 0.08 cm, 0.05 cm, overall grade 2, margins negative, multiple scattered foci of LCIS within the specimen, no DCIS, no LVI, no lymph nodes submitted.  %, CO 80%, HER2 2+ on IHC, FISH negative.  Pathological staging AJCC seventh edition pT1a (M) NX  - I cannot find specific detailed records of this however notes state that patient received postlumpectomy radiation and anastrozole  1 mg daily, pt reports she took anastroazole for 5 yerars  -Patient followed with medical oncologist Dr. Heydi Queen    Regarding recently diagnosed RIGHT breast cancer:  -6/22- pt felt right breast shooting pains radiating to nipple, exacerbated by crocheting    -7/22 bilateral screening mammogram- heterogeneously dense breast, postsurgical changes in the left breast, scattered calcifications in right breast.  1.5 cm asymmetry, in line with the nipple, deep within the right breast, needs further evaluation    - 8/22- right mammogram: Spiculated mass measuring 18 mm in greatest dimension with surrounding architectural distortion, at 9 o'clock position posterior depth, scattered benign calcifications    -8/22- right breast ultrasound: Spiculated hypoechoic mass at the 9 o'clock position posterior depth measuring 1.5 cm in greatest dimension, right axilla no abnormal lymph nodes, BI-RADS 5    - 8/22- ultrasound-guided biopsy of right breast lesion with clip placement- 5x14 gauge core biopsies obtained  --- Pathology is not available at this time for review    -9/16/2022-right breast lumpectomy with second excision for involved margins with pathology showing:  A.  Right breast tissue, radioactive seed guided lumpectomy-Invasive ductal carcinoma, involving inferior and medial margins.  B.  Right breast inferior medial margin, excision-Invasive ductal carcinoma. New inferior and medial margins are free.  ---Invasive ductal carcinoma, grade 3, 1.7 cm, no LVI, dermal lymphovascular invasion N/A, all margins negative for invasive carcinoma (0.55 cm to new inferior margin on specimen B), regional lymph node status N/A (no lymph nodes submitted or found)  ---ER 90 to 100%  --- UT 80 to 90%  --- HER2 zero on IHC  --- Pathologic stage classification (AJCC eighth edition) pT1c NX    Microscopic Description     A.  Sections demonstrate breast parenchyma with tumor mass composed of sheets and cords of markedly pleomorphic cells  "with scattered mitotic figures, including locally increased mitotic activity (T3 N3 M2).  The inferior and medial margins are involved by tumor.  There is prominent perineural invasion.  B.  Sections demonstrate breast parenchyma with invasive ductal carcinoma similar to that in specimen A.  The closest margin is inferior and measures 0.55 cm.     -9/22 CBC, BMP reviewed    Patient reports she is feeling great.  Patient denies any significant breast pain since her lumpectomy.  Patient denies weight loss.  Patient denies history of blood clots for herself, states that her family history of blood clots is related to her sister who was admitted for a hysterectomy followed by complications of peritonitis and \"blood clots throughout her whole body\" which was eventually fatal.        REVIEW OF SYSTEMS:   A 14 point ROS was reviewed with pertinent positives and negatives in the HPI.        HOME MEDICATIONS:  Current Outpatient Medications   Medication Sig Dispense Refill     calcium citrate-vitamin D (CITRACAL) 315-200 MG-UNIT TABS per tablet Take 1 tablet by mouth daily       escitalopram (LEXAPRO) 10 MG tablet Take 10 mg by mouth daily       fluticasone (FLONASE) 50 MCG/ACT nasal spray Spray 1 spray into both nostrils 2 times daily       furosemide (LASIX) 40 MG tablet Take 20 mg by mouth daily        LEVOTHYROXINE SODIUM 25 MCG OR TABS 1 TABLET DAILY       losartan (COZAAR) 100 MG tablet Take 100 mg by mouth daily.       magnesium 250 MG tablet Take 1 tablet by mouth 2 times daily       omeprazole (PRILOSEC) 20 MG DR capsule Take 20 mg by mouth daily       potassium chloride ER (K-DUR/KLOR-CON M) 20 MEQ CR tablet Take 20 mEq by mouth 2 times daily       potassium chloride SA (K-DUR,KLOR-CON M) 20 MEQ tablet Take  by mouth daily.       PROPRANOLOL HCL PO Take 40 mg by mouth 2 times daily.       triamcinolone (KENALOG) 0.1 % external cream Apply twice daily as needed to the back, up to two weeks on at one time. 80 g 11 "     vitamin D2 (ERGOCALCIFEROL) 79391 units (1250 mcg) capsule Take 50,000 Units by mouth once a week           ALLERGIES:  Allergies   Allergen Reactions     Metformin Nausea and Vomiting     Adhesive Tape Hives     Augmentin Diarrhea     Doxycycline Nausea and Vomiting     Food      Lactose Intolerant     Hctz Rash     Lisinopril Cough         PAST MEDICAL HISTORY:  Past Medical History:   Diagnosis Date     Breast cancer (H) 12/7/2012    Left breast     CA - breast cancer 2/7/2013     Essential hypertension, benign     Hypertension, Benign     DALIA (obstructive sleep apnea)      Type II or unspecified type diabetes mellitus without mention of complication, not stated as uncontrolled     Diabetes mellitus   LEFT pT1a (M) NX multifocal microscopic invasive lobular carcinoma, ER positive, MD positive, HER2 negative on FISH s/p lumpectomy, RT, anastrazole (2012), osteoporosis on 7/22 DEXA (diagnosis 9/2017, Fosamax held due to myalgias, ?IV bisphophonate therapy at [facility]), 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          PAST SURGICAL HISTORY:  Past Surgical History:   Procedure Laterality Date     CHOLECYSTECTOMY  1966     HYSTERECTOMY TOTAL ABDOMINAL, BILATERAL SALPINGO-OOPHORECTOMY, COMBINED      age 43     LUMPECTOMY BREAST  1/7/13    Left breast     REPAIR ENTROPION Left 4/19/2021    Procedure: REPAIR, ENTROPION;  Surgeon: Susy Marie MD;  Location: [facility] OR     SPINE SURGERY      Lumbar laminectomy     ZZC ANESTH,BLEPHAROPLASTY     Right total hip arthroplasty  Left total knee arthroplasty      SOCIAL HISTORY:  Social History     Socioeconomic History     Marital status:      Spouse name: Not on file     Number of children: Not on file     Years of  "education: Not on file     Highest education level: Not on file   Occupational History     Not on file   Tobacco Use     Smoking status: Never     Smokeless tobacco: Never   Substance and Sexual Activity     Alcohol use: No     Drug use: No     Sexual activity: Not Currently     Partners: Male   Other Topics Concern     Parent/sibling w/ CABG, MI or angioplasty before 65F 55M? Not Asked   Social History Narrative     Not on file     Social Determinants of Health     Financial Resource Strain: Not on file   Food Insecurity: Not on file   Transportation Needs: Not on file   Physical Activity: Not on file   Stress: Not on file   Social Connections: Not on file   Intimate Partner Violence: Not on file   Housing Stability: Not on file         FAMILY HISTORY:  Family History   Problem Relation Age of Onset     Cancer Mother 40        Uterine, diagnosed late \"40s\",  at age 83     Respiratory Mother         Emphysema     Cancer Father 50        prostate with bone mets,  age 91     Cancer Brother 80        prostate, radiation therapy, living     Heart Disease Maternal Grandfather      Cancer Paternal Grandmother         Breast, age unknown     Heart Disease Paternal Grandfather      Cancer Maternal Grandmother         Cancer of the gallbladder         PHYSICAL EXAM:  Vital signs:    /79   Pulse 69   Temp 97.9  F (36.6  C)   Temp src Oral   SpO2 97 %       ECO  GENERAL/CONSTITUTIONAL: No acute distress.  EYES: Pupils are equal and round. Extraocular movements intact without nystagmus.  No scleral icterus.  ENT/MOUTH: Neck supple. Oropharynx clear, no mucositis.  LYMPH: No submandibular, submental, anterior or posterior cervical, supraclavicular, axillary adenopathy.   RESPIRATORY: Equal chest rise. +wheezing, no rhonchi, crackles.  CARDIOVASCULAR: Regular rate and rhythm without murmurs, gallops, or rubs.  GASTROINTESTINAL: Abdomen soft, non-tender, no distention. No hepatosplenomegaly or palpable " masses. Normal bowel sounds. No rebound, guarding, rigidity. Large diagnoal scar across abdomen at site of prior cholecystectomy.   MUSCULOSKELETAL: Warm and well-perfused, no cyanosis, clubbing, or edema. Right should scar.   NEUROLOGIC: Cranial nerves are grossly intact. Alert, oriented to person, place and time, answers questions appropriately.  INTEGUMENTARY: No rashes or jaundice. Few scars as noted above, pt has radiation tattoo at mid sternum  GAIT: Steady, uses assistive device/walker to ambulate  BREAST: Right-diagnonal well healed scar from 7 o'clock to 9 o'clock at site of lumpectomy with dense breast tissue medial to that, no distinct palpable mass, discharge, rash, or axillary lymphadenopathy.  Left- lumpectomy scar with inversion of lower medial breast, no palpable mass, discharge, rash, or axillary lymphadenopathy. No axillary scars or indications of SLNBx noted b/l        LABS:  CBC RESULTS: No results for input(s): WBC, RBC, HGB, HCT, MCV, MCH, MCHC, RDW, PLT in the last 16370 hours.    Recent Labs   Lab Test 11/14/17  0906 05/11/17  0905 02/25/15  1158 10/21/14  0822   NA  --   --   --  141   POTASSIUM  --   --   --  3.9   CHLORIDE  --   --   --  107   CO2  --   --   --  27   ANIONGAP  --   --   --  7   GLC  --   --   --  140*   BUN  --   --   --  16   CR  --   --   --  0.68   JAZMYNE 8.7 8.9   < > 9.1    < > = values in this interval not displayed.         PATHOLOGY:  Case Report  Surgical Pathology                                Case: [id number]                                   Authorizing Provider:  Mervat Joseph,   Collected:           09/16/2022 01:45 PM                                  MD                                                                           Ordering Location:     [facility]       Received:            09/16/2022 02:14 PM                                  Operating Room                                                               Pathologist:           Florentino  NOAH Arellano Jr., MD                                                         Intraop:               Eb Arrington MD                                                           Specimens:   A) - Breast Right Tissue, Right Breast Tissue                                                        B) - Breast Right Tissue, Right Breast Tissue, Inferior Medial Margin, Ink on new                    margin                                                                                  Final Diagnosis     A.  Right breast tissue, radioactive seed guided lumpectomy-Invasive ductal carcinoma, involving inferior and medial margins.  B.  Right breast inferior medial margin, excision-Invasive ductal carcinoma.  New inferior and medial margins are free.     CAP Synoptic Report  Procedure:  excision (less than total mastectomy)  Specimen Laterality:  right  Histologic Type:  Invasive ductal carcinoma (invasive breast carcinoma of no special type)   Histologic Grade ([place] Histologic Score)         Glandular (acinar)/tubular differentiation:  score 3          Nuclear pleomorphism:  score 3          Mitotic rate:  score 2          Overall thgthrthathdtheth:th th4th Tumor Size:   (greatest dimension of largest invasive focus >0.1 cm):   1.7 cm  Tumor Focality:  single focus of invasive carcinoma  Ductal Carcinoma in Situ (DCIS):  not identified  Tumor Extent:  not applicable (skin, nipple, skeletal muscle not present or uninvolved)  Lymphovascular Invasion:  not identified  Dermal Lymphovascular Invasion:  cannot be determined  Treatment Effect :  No known presurgical therapy  Margins         Invasive carcinoma:  all margins negative for invasive carcinoma               Distance to closest margin(s), specify:  0.55 cm to new inferior margin (specimen B)  Regional Lymph Node Status:  not applicable (no lymph nodes submitted or found)  Distant Metastasis:  not applicable  Biomarkers:  performed on previous [facility] biopsy ([id number]); not repeated  on current specimen         Estrogen Receptor: Positive (%, strong)         Progesterone Receptor: Positive (80-90%, strong)         HER2: Negative (score 0 by IHC)         Cold Ischemia and Fixation Times:              Meet requirements specified in latest version of the ASCO/CAP guidelines  Tumor block(s) for molecular/send out tests:  A4 or A5   Pathologic Stage Classification (AJCC 8th ed):  pT1c   N not assigned (no nodes submitted or found)       Version 4.6.0.0         Electronically signed by Florentino Arellano Jr., MD on 9/19/2022 at 11:05 AM   Gross Description     A. Right breast tissue:  A 30 gm, 4.5 cm medial to lateral, x 3 cm superior to inferior, x 6 cm anterior to posterior radioactive seed-localized lumpectomy specimen that has been previously inked as follows: medial - green, lateral - yellow, superior - blue, inferior - purple, anterior - orange, posterior - black. The specimen is sectioned from anterior to posterior in a coronal plane into 10 slices to reveal a 1.7 x 1.3 x 1.1 cm in slices 4-8 that involves the medial and inferior margins.  The radioactive seed is recovered from slice 7 and returned to Nuclear Medicine. Time tissue removed: 1345; time in formalin: 1430 on 09/16/22.   Cassettes submitted:  A1: Anterior margin  A2: Slice 4, mass  A3: Slice 5, mass  A4: Slice 6, mass  A5: Slice 7, mass  A6: Slice 8, mass  A7: Posterior margin  B. Right breast tissue inferior medial margin: A 15.6 g, 4.5 x 4 x 1.5 cm piece of fatty tissue containing ink on 2 surfaces, green-new medial and purple-new inferior.  The specimen is sectioned revealing slightly fibrotic tissue nodular tissue on the uninked surface.  Representative sections are submitted in B1-5.  Time tissue removed: 13:48; time in formalin:14:42 on 09/16/22.  (DT)   Intraoperative Consultation     Right breast tissue, Intraoperative Gross Consultation:  Mass involving inferior and medial margins  Reported to Dr. Joseph Per   Murphy      Microscopic Description     A.  Sections demonstrate breast parenchyma with tumor mass composed of sheets and cords of markedly pleomorphic cells with scattered mitotic figures, including locally increased mitotic activity (T3 N3 M2).  The inferior and medial margins are involved by tumor.  There is prominent perineural invasion.  B.  Sections demonstrate breast parenchyma with invasive ductal carcinoma similar to that in specimen A.  The closest margin is inferior and measures 0.55 cm.              IMAGING:    EXAM: US BX BREAST RT W CLIP AND MAMMO       DATE: 8/11/2022 8:18 AM     CLINICAL DATA: , [facility], , R92.2 Inconclusive mammogram, suspicious right   breast lesion,     TECHNIQUE:     Informed consent regarding risks and benefits of the procedure was   obtained.     Patient was sterilely prepped and draped with local buffered lidocaine   administered for anesthesia.     Under sonographic guidance, 5 separate core biopsy specimens, 14-gauge,   were obtained and submitted for pathologic evaluation.     Biopsy clip placed.     COMPARISON: 8/4/2002, 7/19/2022     FINDINGS:     Ultrasound-guided biopsy of right breast lesion 9:00 position with 5 cc   clip placed.     Postprocedure mammography confirms biopsy clip at the lesion site.      Pillo Gavin MD - 08/04/2022   Formatting of this note might be different from the original.   EXAM: RIGHT BREAST SONOGRAM     DATE: 8/4/2022 9:53 AM     CLINICAL DATA: R92.8 Other abnormal and inconclusive findings on diagnostic imaging of breast.     COMPARISON: Prior right mammograms from today and 2019.     FINDINGS: Examination performed using real-time grayscale and power Doppler imaging in the area of concern on recent mammograms.     Spiculated hypoechoic mass at the 9 o'clock position posterior depth measuring 1.5 cm in greatest dimension, corresponding to the mammographic mass.     Evaluation at the right axilla shows no abnormal lymph nodes.      IMPRESSION   IMPRESSION: Spiculated hypoechoic mass measuring 1.5 cm in greatest dimension at the 9 o'clock position is highly suggestive of malignancy. Ultrasound-guided core biopsy will be scheduled [biopsy was not possible today based on staff limitations of the receiving pathology laboratory]. No abnormal nodes seen at the right axilla.     FOLLOW-UP RECOMMENDATION: An ultrasound core biopsy     BIRADS: BIRADS 5 - Highly suggestive of malignancy        Pillo Gavin MD - 08/04/2022   Formatting of this note might be different from the original.   EXAM: PROBLEM-SOLVING RIGHT DIGITAL MAMMOGRAM WITH TOMOSYNTHESIS AND CAD     DATE: 8/4/2022 9:32 AM     CLINICAL: R 92.8 other abnormal and inconclusive findings on diagnostic imaging of breast. 1.5 cm asymmetry in the line of the nipple deep within the right breast seen on the oblique view only.     COMPARISON: Mammograms dating back to November 2016, the most recent 7/19/2022.     TECHNIQUE: CC and MLO views of the right breast were obtained. Tomosynthesis technique was also used.  Synthetic 2-D views were generated off the tomosynthesis data sets.     FINDINGS: Spiculated mass measuring 18 mm in greatest dimension with surrounding architectural distortion is present at the 9 o'clock position posterior depth, not shown on the mammograms prior to last month and highly concerning for malignancy. No evidence elsewhere for spiculated mass or architectural distortion. Scattered benign calcifications.     IMPRESSION   IMPRESSION: Spiculated 18 mm mass 9 o'clock position posterior depth right breast with surrounding architectural distortion, highly concerning for malignancy. Further evaluation with sonography indicated, and pending.     BREAST DENSITY: Heterogeneously dense     FOLLOW-UP RECOMMENDATION: Ultrasound     BIRADS: BIRADS 0 - Needs Additional Imaging Evaluation       REPORT SIGNED BY DR. Pillo Gavin    Impression    IMPRESSION:     1.5 cm asymmetry, in  line with the nipple, deep within the right breast,   seen on the oblique view only. Further evaluation with additional   mammographic views and possible ultrasound is recommended.     FOLLOW-UP RECOMMENDATION: Additional views with possible ultrasound     BIRADS: BIRADS 0 - Needs Additional Imaging Evaluation       REPORT SIGNED BY DR. Osei Bhatia  Narrative    EXAM: BILATERAL DIGITAL SCREENING MAMMOGRAM WITH CAD     DATE: 7/19/2022 3:58 PM     COMPARISON: 11/13/2019. 5/15/2018. 11/10/2016. 11//2015. 10/27/2014.     CLINICAL DATA: Screening study.     TECHNIQUE: Craniocaudal and oblique digital mammograms of both breasts   were obtained.  This study was evaluated with computer-aided detection   software (CAD).     FINDINGS:     BREAST DENSITY: Heterogeneously dense     Postsurgical changes again seen in the left breast. Scattered   calcifications again seen in the right breast. There is a 1.5 cm   asymmetry, in line with the nipple, deep within the right breast, seen on   the oblique view only.     Osei Bhatia MD - 07/20/2022   Formatting of this note might be different from the original.   EXAM: DEXA BONE DENSITY STUDY     DATE: 7/19/2022 3:50 PM     COMPARISON: 11/6/2019     CLINICAL DATA: Osteoporosis screening. Post-menopausal. Bilateral hip replacements     TECHNIQUE: A bone density evaluation using DEXA (dual energy x-ray absorptiometry) was performed for your patient at the [facility] [place], [place] in [place] using a [employer] ([employer]) unit.     FINDINGS:     Lumbar Spine:     Average BMD (g/cm2): 1.031     T-score: -0.1     Z-score: 2.7       Left Forearm:     Average BMD (g/cm2): 0.513     T-score: -3.0     Z-score: 0.6       Vertebral assessment: frontal and lateral scanograms demonstrate no compression deformities in the thoracic or lumbar spine. Compared to the previous study, the measured bone density in the lumbar spine has increased by 2% and the measured bone density in the  left hip has not changed significantly.     FRAX (10-year Fracture Risk calculated for an untreated patient): not reported in patients with osteoporosis.       IMPRESSION   IMPRESSION:     Osteoporosis.           WHO Classification of bone density for post-menopausal women, and men over 50 years of age, is as follows:     Normal bone density: T score greater than -1.0.   Osteopenia (Low bone density): T score between -1.0 and -2.5.   Osteoporosis: T score less than -2.5.          ASSESSMENT/PLAN:  Leida Goddard is a 82 year old female w/hx of LEFT breast cancer in 2013 and now RIGHT breast cancer    #RIGHT invasive ductal carcinoma, pT1c Nx  - ER positive (%), CA positive (80-90%), HER2 zero on IHC  - Request pathology sample from 9/16/2022 lumpectomy, to be reviewed by pathology team within our system- request clarification of microscopic description  - Send Oncotype score, pt would like to at least know score though she may not be inclined to pursue chemotherapy if oncotype score is high  - Patient will need systemic anti-estrogen therapy, has significant osteoporosis therefore can consider starting with tamoxifen for 2 years with switch strategy to aromatase inhibitors (discussed that this has at least similar survival benefit as aromatase inhibitors alone, with the added benefit of bone strengthening with tamoxifen)  - Regarding adjuvant radiation, patient is over 70 years old, tumor is less than 2 cm, clinically node-negative and patient is status post BCT, if pt commits to taking 5 years of anti-estrogen therapy, can consider omitting RT, will refer to radiation oncology for their recommendations as well (In a study of patients with clinical stage I, ER-positive breast cancer who were greater than or equal to 70 years of age at diagnosis,patients were randomized to receive BCS with WBRT or BCS alone, both with tamoxifen for 5 years. Locoregional recurrence rates were 1% in the BCS, radiation, and  tamoxifen arm and 4% in the BCS plus tamoxifen arm. There were no differences in OS, DFS, or need for mastectomy These results were confirmed in an updated analysis of this study with a median follow-up of 12.6 years. The NCCN Guidelines allow for the use of BCS (pathologically negative margin required) with 5 years of tamoxifen or an aromatase inhibitor, without breast irradiation, for patients 70 years of age or older with clinically negative lymph nodes and ER-positive, T1 breast cancers (category 1)    #LEFT breast cancer multifocal microscopic invasive ductal  carcinoma, pT1a (M) NX (2012)  -ER positive, CO positive, HER2 negative on FISH   -s/p lumpectomy, RT, anastrazole x 5 years  -Patient has had bilateral breast cancer, recommend genetic counseling as this may be relevant for patient's family (1 daughter Fatimah, adopted; 1 sister passed; pt has several nieces and grand daughters she is concerned about)    #Osteoporosis  - osteoporosis on 7/22 DEXA  - diagnosis 9/2017, Fosamax held due to myalgias, thinks she got IV medication for osteoporosis in 7/22 at [facility]   -We will check which medication patient received at [facility] and when it was last given prior to ordering bisphosphonate therapy,  -Check baseline CMP to assess renal status  - Pt has upper and lower dentures, no native teeth, no upcoming dental work     RTC in 2 to 3 weeks for follow-up with me after Oncotype score has been received    ADDENDUM: records received and reviewed including  1. Pt received anastrazole 1mg from at least 1/8/14 to 1/19/18  2.  Patient was given Zometa 5 mg on 8/8/2022 with Dr. Samantha Rangel DO  Hematology/Oncology  [facility]    Future Appointments   Date Time Provider Department Center   10/13/2022 10:45 AM Tracy Rangel DO [facility]

## 2022-10-13 NOTE — LETTER
10/13/2022         RE: Leida Goddard  [address] [address] [place] [address] [address]  [facility] [id number]        Dear Colleague,    Thank you for referring your patient, Leida Goddard, to the [facility]. Please see a copy of my visit note below.    [facility]    Hematology/Oncology New Patient Note      Today's Date: 10/13/2022    Reason for Consultation: RIGHT breast cancer  Referring Provider: Dr. Mervat Joseph      HISTORY OF PRESENT ILLNESS: Leida Goddard is a 82 year old female who was referred to the Hematology/Oncology Clinic for RIGHT breast cancer.    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    Patient's previous work-up was done at [facility].  I have reviewed the records in care everywhere and they are summarized below.    Regarding LEFT breast cancer 2012:   -2012-screening mammogram showed grouped calcifications in the left breast at 7:00 posterior depth, increased in number, confirmed on ultrasound  - 2012-stereotactic biopsy with clip placement, pathology showed LCIS with fibroadenomatoid changes with microcalcifications  - 2013- left breast lumpectomy with pathology showin microscopic foci of invasive lobular carcinoma, largest focus 0.11 cm others 0.1 cm, 0.08 cm, 0.05 cm, overall grade 2, margins negative, multiple scattered foci of LCIS within the specimen, no  DCIS, no LVI, no lymph nodes submitted.  %, GA 80%, HER2 2+ on IHC, FISH negative.  Pathological staging AJCC seventh edition pT1a (M) NX  - I cannot find specific detailed records of this however notes state that patient received postlumpectomy radiation and anastrozole 1 mg daily, pt reports she took anastroazole for 5 yerars  -Patient followed with medical oncologist Dr. Hedyi Queen    Regarding recently diagnosed RIGHT breast cancer:  -6/22- pt felt right breast shooting pains radiating to nipple, exacerbated by crocheting    -7/22 bilateral screening mammogram- heterogeneously dense breast, postsurgical changes in the left breast, scattered calcifications in right breast.  1.5 cm asymmetry, in line with the nipple, deep within the right breast, needs further evaluation    - 8/22- right mammogram: Spiculated mass measuring 18 mm in greatest dimension with surrounding architectural distortion, at 9 o'clock position posterior depth, scattered benign calcifications    -8/22- right breast ultrasound: Spiculated hypoechoic mass at the 9 o'clock position posterior depth measuring 1.5 cm in greatest dimension, right axilla no abnormal lymph nodes, BI-RADS 5    - 8/22- ultrasound-guided biopsy of right breast lesion with clip placement- 5x14 gauge core biopsies obtained  --- Pathology is not available at this time for review    -9/16/2022-right breast lumpectomy with second excision for involved margins with pathology showing:  A.  Right breast tissue, radioactive seed guided lumpectomy-Invasive ductal carcinoma, involving inferior and medial margins.  B.  Right breast inferior medial margin, excision-Invasive ductal carcinoma. New inferior and medial margins are free.  ---Invasive ductal carcinoma, grade 3, 1.7 cm, no LVI, dermal lymphovascular invasion N/A, all margins negative for invasive carcinoma (0.55 cm to new inferior margin on specimen B), regional lymph node status N/A (no lymph nodes submitted or  "found)  ---ER 90 to 100%  --- ID 80 to 90%  --- HER2 zero on IHC  --- Pathologic stage classification (AJCC eighth edition) pT1c NX    Microscopic Description     A.  Sections demonstrate breast parenchyma with tumor mass composed of sheets and cords of markedly pleomorphic cells with scattered mitotic figures, including locally increased mitotic activity (T3 N3 M2).  The inferior and medial margins are involved by tumor.  There is prominent perineural invasion.  B.  Sections demonstrate breast parenchyma with invasive ductal carcinoma similar to that in specimen A.  The closest margin is inferior and measures 0.55 cm.     -9/22 CBC, BMP reviewed    Patient reports she is feeling great.  Patient denies any significant breast pain since her lumpectomy.  Patient denies weight loss.  Patient denies history of blood clots for herself, states that her family history of blood clots is related to her sister who was admitted for a hysterectomy followed by complications of peritonitis and \"blood clots throughout her whole body\" which was eventually fatal.        REVIEW OF SYSTEMS:   A 14 point ROS was reviewed with pertinent positives and negatives in the HPI.        HOME MEDICATIONS:  Current Outpatient Medications   Medication Sig Dispense Refill     calcium citrate-vitamin D (CITRACAL) 315-200 MG-UNIT TABS per tablet Take 1 tablet by mouth daily       escitalopram (LEXAPRO) 10 MG tablet Take 10 mg by mouth daily       fluticasone (FLONASE) 50 MCG/ACT nasal spray Spray 1 spray into both nostrils 2 times daily       furosemide (LASIX) 40 MG tablet Take 20 mg by mouth daily        LEVOTHYROXINE SODIUM 25 MCG OR TABS 1 TABLET DAILY       losartan (COZAAR) 100 MG tablet Take 100 mg by mouth daily.       magnesium 250 MG tablet Take 1 tablet by mouth 2 times daily       omeprazole (PRILOSEC) 20 MG DR capsule Take 20 mg by mouth daily       potassium chloride ER (K-DUR/KLOR-CON M) 20 MEQ CR tablet Take 20 mEq by mouth 2 times " daily       potassium chloride SA (K-DUR,KLOR-CON M) 20 MEQ tablet Take  by mouth daily.       PROPRANOLOL HCL PO Take 40 mg by mouth 2 times daily.       triamcinolone (KENALOG) 0.1 % external cream Apply twice daily as needed to the back, up to two weeks on at one time. 80 g 11     vitamin D2 (ERGOCALCIFEROL) 17967 units (1250 mcg) capsule Take 50,000 Units by mouth once a week           ALLERGIES:  Allergies   Allergen Reactions     Metformin Nausea and Vomiting     Adhesive Tape Hives     Augmentin Diarrhea     Doxycycline Nausea and Vomiting     Food      Lactose Intolerant     Hctz Rash     Lisinopril Cough         PAST MEDICAL HISTORY:  Past Medical History:   Diagnosis Date     Breast cancer (H) 12/7/2012    Left breast     CA - breast cancer 2/7/2013     Essential hypertension, benign     Hypertension, Benign     DALIA (obstructive sleep apnea)      Type II or unspecified type diabetes mellitus without mention of complication, not stated as uncontrolled     Diabetes mellitus   LEFT pT1a (M) NX multifocal microscopic invasive lobular carcinoma, ER positive, NM positive, HER2 negative on FISH s/p lumpectomy, RT, anastrazole (2012), osteoporosis on 7/22 DEXA (diagnosis 9/2017, Fosamax held due to myalgias, ?IV bisphophonate therapy at [facility]), 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          PAST SURGICAL HISTORY:  Past Surgical History:   Procedure Laterality Date     CHOLECYSTECTOMY  1966     HYSTERECTOMY TOTAL ABDOMINAL, BILATERAL SALPINGO-OOPHORECTOMY, COMBINED      age 43     LUMPECTOMY BREAST  1/7/13    Left breast     REPAIR ENTROPION Left 4/19/2021    Procedure: REPAIR, ENTROPION;  Surgeon: Susy Marie MD;  Location: [facility] OR     SPINE SURGERY       "Lumbar laminectomy     ZZC ANESTH,BLEPHAROPLASTY     Right total hip arthroplasty  Left total knee arthroplasty      SOCIAL HISTORY:  Social History     Socioeconomic History     Marital status:      Spouse name: Not on file     Number of children: Not on file     Years of education: Not on file     Highest education level: Not on file   Occupational History     Not on file   Tobacco Use     Smoking status: Never     Smokeless tobacco: Never   Substance and Sexual Activity     Alcohol use: No     Drug use: No     Sexual activity: Not Currently     Partners: Male   Other Topics Concern     Parent/sibling w/ CABG, MI or angioplasty before 65F 55M? Not Asked   Social History Narrative     Not on file     Social Determinants of Health     Financial Resource Strain: Not on file   Food Insecurity: Not on file   Transportation Needs: Not on file   Physical Activity: Not on file   Stress: Not on file   Social Connections: Not on file   Intimate Partner Violence: Not on file   Housing Stability: Not on file         FAMILY HISTORY:  Family History   Problem Relation Age of Onset     Cancer Mother 40        Uterine, diagnosed late \"40s\",  at age 83     Respiratory Mother         Emphysema     Cancer Father 50        prostate with bone mets,  age 91     Cancer Brother 80        prostate, radiation therapy, living     Heart Disease Maternal Grandfather      Cancer Paternal Grandmother         Breast, age unknown     Heart Disease Paternal Grandfather      Cancer Maternal Grandmother         Cancer of the gallbladder         PHYSICAL EXAM:  Vital signs:    /79   Pulse 69   Temp 97.9  F (36.6  C)   Temp src Oral   SpO2 97 %       ECO  GENERAL/CONSTITUTIONAL: No acute distress.  EYES: Pupils are equal and round. Extraocular movements intact without nystagmus.  No scleral icterus.  ENT/MOUTH: Neck supple. Oropharynx clear, no mucositis.  LYMPH: No submandibular, submental, anterior or posterior " cervical, supraclavicular, axillary adenopathy.   RESPIRATORY: Equal chest rise. +wheezing, no rhonchi, crackles.  CARDIOVASCULAR: Regular rate and rhythm without murmurs, gallops, or rubs.  GASTROINTESTINAL: Abdomen soft, non-tender, no distention. No hepatosplenomegaly or palpable masses. Normal bowel sounds. No rebound, guarding, rigidity. Large diagnoal scar across abdomen at site of prior cholecystectomy.   MUSCULOSKELETAL: Warm and well-perfused, no cyanosis, clubbing, or edema. Right should scar.   NEUROLOGIC: Cranial nerves are grossly intact. Alert, oriented to person, place and time, answers questions appropriately.  INTEGUMENTARY: No rashes or jaundice. Few scars as noted above, pt has radiation tattoo at mid sternum  GAIT: Steady, uses assistive device/walker to ambulate  BREAST: Right-diagnonal well healed scar from 7 o'clock to 9 o'clock at site of lumpectomy with dense breast tissue medial to that, no distinct palpable mass, discharge, rash, or axillary lymphadenopathy.  Left- lumpectomy scar with inversion of lower medial breast, no palpable mass, discharge, rash, or axillary lymphadenopathy. No axillary scars or indications of SLNBx noted b/l        LABS:  CBC RESULTS: No results for input(s): WBC, RBC, HGB, HCT, MCV, MCH, MCHC, RDW, PLT in the last 52467 hours.    Recent Labs   Lab Test 11/14/17  0906 05/11/17  0905 02/25/15  1158 10/21/14  0822   NA  --   --   --  141   POTASSIUM  --   --   --  3.9   CHLORIDE  --   --   --  107   CO2  --   --   --  27   ANIONGAP  --   --   --  7   GLC  --   --   --  140*   BUN  --   --   --  16   CR  --   --   --  0.68   JAZMYNE 8.7 8.9   < > 9.1    < > = values in this interval not displayed.         PATHOLOGY:  Case Report  Surgical Pathology                                Case: [id number]                                   Authorizing Provider:  Mervat Joseph,   Collected:           09/16/2022 01:45 PM                                  MD                                                                            Ordering Location:     [facility]       Received:            09/16/2022 02:14 PM                                  Operating Room                                                               Pathologist:           Florentino Arellano Jr., MD                                                         Intraop:               Eb Arrington MD                                                           Specimens:   A) - Breast Right Tissue, Right Breast Tissue                                                        B) - Breast Right Tissue, Right Breast Tissue, Inferior Medial Margin, Ink on new                    margin                                                                                  Final Diagnosis     A.  Right breast tissue, radioactive seed guided lumpectomy-Invasive ductal carcinoma, involving inferior and medial margins.  B.  Right breast inferior medial margin, excision-Invasive ductal carcinoma.  New inferior and medial margins are free.     CAP Synoptic Report  Procedure:  excision (less than total mastectomy)  Specimen Laterality:  right  Histologic Type:  Invasive ductal carcinoma (invasive breast carcinoma of no special type)   Histologic Grade (Laura Histologic Score)         Glandular (acinar)/tubular differentiation:  score 3          Nuclear pleomorphism:  score 3          Mitotic rate:  score 2          Overall ndgndrndanddndend:nd nd2nd Tumor Size:   (greatest dimension of largest invasive focus >0.1 cm):   1.7 cm  Tumor Focality:  single focus of invasive carcinoma  Ductal Carcinoma in Situ (DCIS):  not identified  Tumor Extent:  not applicable (skin, nipple, skeletal muscle not present or uninvolved)  Lymphovascular Invasion:  not identified  Dermal Lymphovascular Invasion:  cannot be determined  Treatment Effect :  No known presurgical therapy  Margins         Invasive carcinoma:  all margins negative for invasive carcinoma                Distance to closest margin(s), specify:  0.55 cm to new inferior margin (specimen B)  Regional Lymph Node Status:  not applicable (no lymph nodes submitted or found)  Distant Metastasis:  not applicable  Biomarkers:  [facility][id number]); not repeated on current specimen         Estrogen Receptor: Positive (%, strong)         Progesterone Receptor: Positive (80-90%, strong)         HER2: Negative (score 0 by IHC)         Cold Ischemia and Fixation Times:              Meet requirements specified in latest version of the ASCO/CAP guidelines  Tumor block(s) for molecular/send out tests:  A4 or A5   Pathologic Stage Classification (AJCC 8th ed):  pT1c   N not assigned (no nodes submitted or found)       Version 4.6.0.0         Electronically signed by Florentino Arellano Jr., MD on 9/19/2022 at 11:05 AM   Gross Description     A. Right breast tissue:  A 30 gm, 4.5 cm medial to lateral, x 3 cm superior to inferior, x 6 cm anterior to posterior radioactive seed-localized lumpectomy specimen that has been previously inked as follows: medial - green, lateral - yellow, superior - blue, inferior - purple, anterior - orange, posterior - black. The specimen is sectioned from anterior to posterior in a coronal plane into 10 slices to reveal a 1.7 x 1.3 x 1.1 cm in slices 4-8 that involves the medial and inferior margins.  The radioactive seed is recovered from slice 7 and returned to Nuclear Medicine. Time tissue removed: 1345; time in formalin: 1430 on 09/16/22.   Cassettes submitted:  A1: Anterior margin  A2: Slice 4, mass  A3: Slice 5, mass  A4: Slice 6, mass  A5: Slice 7, mass  A6: Slice 8, mass  A7: Posterior margin  B. Right breast tissue inferior medial margin: A 15.6 g, 4.5 x 4 x 1.5 cm piece of fatty tissue containing ink on 2 surfaces, green-new medial and purple-new inferior.  The specimen is sectioned revealing slightly fibrotic tissue nodular tissue on the uninked surface.   Representative sections are submitted in B1-5.  Time tissue removed: 13:48; time in formalin:14:42 on 09/16/22.  (DT)   Intraoperative Consultation     Right breast tissue, Intraoperative Gross Consultation:  Mass involving inferior and medial margins  Reported to Dr. Joseph Per Dr. Arrington      Microscopic Description     A.  Sections demonstrate breast parenchyma with tumor mass composed of sheets and cords of markedly pleomorphic cells with scattered mitotic figures, including locally increased mitotic activity (T3 N3 M2).  The inferior and medial margins are involved by tumor.  There is prominent perineural invasion.  B.  Sections demonstrate breast parenchyma with invasive ductal carcinoma similar to that in specimen A.  The closest margin is inferior and measures 0.55 cm.              IMAGING:    EXAM: US BX BREAST RT W CLIP AND MAMMO       DATE: 8/11/2022 8:18 AM     CLINICAL DATA: , [place], , R92.2 Inconclusive mammogram, suspicious right   breast lesion,     TECHNIQUE:     Informed consent regarding risks and benefits of the procedure was   obtained.     Patient was sterilely prepped and draped with local buffered lidocaine   administered for anesthesia.     Under sonographic guidance, 5 separate core biopsy specimens, 14-gauge,   were obtained and submitted for pathologic evaluation.     Biopsy clip placed.     COMPARISON: 8/4/2002, 7/19/2022     FINDINGS:     Ultrasound-guided biopsy of right breast lesion 9:00 position with 5 cc   clip placed.     Postprocedure mammography confirms biopsy clip at the lesion site.      Pillo Gavin MD - 08/04/2022   Formatting of this note might be different from the original.   EXAM: RIGHT BREAST SONOGRAM     DATE: 8/4/2022 9:53 AM     CLINICAL DATA: R92.8 Other abnormal and inconclusive findings on diagnostic imaging of breast.     COMPARISON: Prior right mammograms from today and 2019.     FINDINGS: Examination performed using real-time grayscale and power  Doppler imaging in the area of concern on recent mammograms.     Spiculated hypoechoic mass at the 9 o'clock position posterior depth measuring 1.5 cm in greatest dimension, corresponding to the mammographic mass.     Evaluation at the right axilla shows no abnormal lymph nodes.     IMPRESSION   IMPRESSION: Spiculated hypoechoic mass measuring 1.5 cm in greatest dimension at the 9 o'clock position is highly suggestive of malignancy. Ultrasound-guided core biopsy will be scheduled [biopsy was not possible today based on staff limitations of the receiving pathology laboratory]. No abnormal nodes seen at the right axilla.     FOLLOW-UP RECOMMENDATION: An ultrasound core biopsy     BIRADS: BIRADS 5 - Highly suggestive of malignancy        Pillo Gavin MD - 08/04/2022   Formatting of this note might be different from the original.   EXAM: PROBLEM-SOLVING RIGHT DIGITAL MAMMOGRAM WITH TOMOSYNTHESIS AND CAD     DATE: 8/4/2022 9:32 AM     CLINICAL: R 92.8 other abnormal and inconclusive findings on diagnostic imaging of breast. 1.5 cm asymmetry in the line of the nipple deep within the right breast seen on the oblique view only.     COMPARISON: Mammograms dating back to November 2016, the most recent 7/19/2022.     TECHNIQUE: CC and MLO views of the right breast were obtained. Tomosynthesis technique was also used.  Synthetic 2-D views were generated off the tomosynthesis data sets.     FINDINGS: Spiculated mass measuring 18 mm in greatest dimension with surrounding architectural distortion is present at the 9 o'clock position posterior depth, not shown on the mammograms prior to last month and highly concerning for malignancy. No evidence elsewhere for spiculated mass or architectural distortion. Scattered benign calcifications.     IMPRESSION   IMPRESSION: Spiculated 18 mm mass 9 o'clock position posterior depth right breast with surrounding architectural distortion, highly concerning for malignancy. Further  evaluation with sonography indicated, and pending.     BREAST DENSITY: Heterogeneously dense     FOLLOW-UP RECOMMENDATION: Ultrasound     BIRADS: BIRADS 0 - Needs Additional Imaging Evaluation       REPORT SIGNED BY DR. Pillo Gavin    Impression    IMPRESSION:     1.5 cm asymmetry, in line with the nipple, deep within the right breast,   seen on the oblique view only. Further evaluation with additional   mammographic views and possible ultrasound is recommended.     FOLLOW-UP RECOMMENDATION: Additional views with possible ultrasound     BIRADS: BIRADS 0 - Needs Additional Imaging Evaluation       REPORT SIGNED BY DR. Osei Bhatia  Narrative    EXAM: BILATERAL DIGITAL SCREENING MAMMOGRAM WITH CAD     DATE: 7/19/2022 3:58 PM     COMPARISON: 11/13/2019. 5/15/2018. 11/10/2016. 11//2015. 10/27/2014.     CLINICAL DATA: Screening study.     TECHNIQUE: Craniocaudal and oblique digital mammograms of both breasts   were obtained.  This study was evaluated with computer-aided detection   software (CAD).     FINDINGS:     BREAST DENSITY: Heterogeneously dense     Postsurgical changes again seen in the left breast. Scattered   calcifications again seen in the right breast. There is a 1.5 cm   asymmetry, in line with the nipple, deep within the right breast, seen on   the oblique view only.     Osei Bhatia MD - 07/20/2022   Formatting of this note might be different from the original.   EXAM: DEXA BONE DENSITY STUDY     DATE: 7/19/2022 3:50 PM     COMPARISON: 11/6/2019     CLINICAL DATA: Osteoporosis screening. Post-menopausal. Bilateral hip replacements     TECHNIQUE: A bone density evaluation using DEXA (dual energy x-ray absorptiometry) was performed for your patient at the [facility] in [place] using a HoloHouseTab (Discovery) unit.     FINDINGS:     Lumbar Spine:     Average BMD (g/cm2): 1.031     T-score: -0.1     Z-score: 2.7       Left Forearm:     Average BMD (g/cm2): 0.513     T-score:  -3.0     Z-score: 0.6       Vertebral assessment: frontal and lateral scanograms demonstrate no compression deformities in the thoracic or lumbar spine. Compared to the previous study, the measured bone density in the lumbar spine has increased by 2% and the measured bone density in the left hip has not changed significantly.     FRAX (10-year Fracture Risk calculated for an untreated patient): not reported in patients with osteoporosis.       IMPRESSION   IMPRESSION:     Osteoporosis.           WHO Classification of bone density for post-menopausal women, and men over 50 years of age, is as follows:     Normal bone density: T score greater than -1.0.   Osteopenia (Low bone density): T score between -1.0 and -2.5.   Osteoporosis: T score less than -2.5.          ASSESSMENT/PLAN:  Leida Goddard is a 82 year old female w/hx of LEFT breast cancer in 2013 and now RIGHT breast cancer    #RIGHT invasive ductal carcinoma, pT1c Nx  - ER positive (%), UT positive (80-90%), HER2 zero on IHC  - Request pathology sample from 9/16/2022 lumpectomy, to be reviewed by pathology team within our system- request clarification of microscopic description  - Send Oncotype score, pt would like to at least know score though she may not be inclined to pursue chemotherapy if oncotype score is high  - Patient will need systemic anti-estrogen therapy, has significant osteoporosis therefore can consider starting with tamoxifen for 2 years with switch strategy to aromatase inhibitors (discussed that this has at least similar survival benefit as aromatase inhibitors alone, with the added benefit of bone strengthening with tamoxifen)  - Regarding adjuvant radiation, patient is over 70 years old, tumor is less than 2 cm, clinically node-negative and patient is status post BCT, if pt commits to taking 5 years of anti-estrogen therapy, can consider omitting RT, will refer to radiation oncology for their recommendations as well (In a study  of patients with clinical stage I, ER-positive breast cancer who were greater than or equal to 70 years of age at diagnosis,patients were randomized to receive BCS with WBRT or BCS alone, both with tamoxifen for 5 years. Locoregional recurrence rates were 1% in the BCS, radiation, and tamoxifen arm and 4% in the BCS plus tamoxifen arm. There were no differences in OS, DFS, or need for mastectomy These results were confirmed in an updated analysis of this study with a median follow-up of 12.6 years. The NCCN Guidelines allow for the use of BCS (pathologically negative margin required) with 5 years of tamoxifen or an aromatase inhibitor, without breast irradiation, for patients 70 years of age or older with clinically negative lymph nodes and ER-positive, T1 breast cancers (category 1)    #LEFT breast cancer multifocal microscopic invasive lobular carcinoma, pT1a (M) NX (2012)  -ER positive, ME positive, HER2 negative on FISH   -s/p lumpectomy, RT, anastrazole x 5 years  -Patient has had bilateral breast cancer, recommend genetic counseling as this may be relevant for patient's family (1 daughter Fatimah, adopted; 1 sister passed; pt has several nieces and grand daughters she is concerned about)    #Osteoporosis  - osteoporosis on 7/22 DEXA  - diagnosis 9/2017, Fosamax held due to myalgias, thinks she got IV medication for osteoporosis in 7/22 at [facility]   -We will check which medication patient received at [facility] and when it was last given prior to ordering bisphosphonate therapy,  -Check baseline CMP to assess renal status  - Pt has upper and lower dentures, no native teeth, no upcoming dental work     RTC in 2 to 3 weeks for follow-up with me after Oncotype score has been received    Tracy Rangel DO  Hematology/Oncology  [facility]    Future Appointments   Date Time Provider Department Center   10/13/2022 10:45 AM Tracy Rangel DO [facility]          Again, thank you  for allowing me to participate in the care of your patient.        Sincerely,        RHONDA GERMAIN, DO

## 2022-10-14 NOTE — TELEPHONE ENCOUNTER
Action October 14, 2022 12:48 PM Evelin Cuevas Taken Slides from [facility] received and taken to 5th floor path lab for review.

## 2022-10-19 ENCOUNTER — DOCUMENTATION ONLY (OUTPATIENT)
Dept: ONCOLOGY | Facility: CLINIC | Age: 82
End: 2022-10-19

## 2022-10-19 NOTE — NURSING NOTE
Oncotype completed and faxed to [facility] [phone].    Judith Diamond, RN, BSN  RN Care Coordinator - Oncology  [facility]

## 2022-10-25 NOTE — PROGRESS NOTES
MEDICAL RECORDS REQUEST   Radiation Oncology  [address] [place] [address]  PHONE: [phone]  Fax: [phone]        FUTURE VISIT INFORMATION                                                   Leida A ESTRADA Goddard: 1940 scheduled for future visit at [facility]    APPOINTMENT INFORMATION:    Date: 10/28/2022    Provider:  Dr. Prasanna Michelle     Reason for Visit/Diagnosis: Invasive ductal carcinoma of breast, female, right    REFERRAL INFORMATION:    Referring provider:  Tracy Rangel,     RECORDS REQUESTED FOR VISIT                                                     Action    Action Taken 10/27/2022 8:30AM [facility]   I called pt Leida - unavailable. I tried calling pt Leida again - still no luck.     10/27/2022 10:29AM KE[facility]   Pt Leida called back- she had rad onc treatment - she was seen at the [facility] in - images are PACS.      SOFT TISSUE & BREAST     CT, MRI, PET/CT AND REPORTS yes [facility]  images are in PACS   ANY RECENT LABS yes   SURGICAL REPORTS yes     Path Consult in [facility]- 10/17/2022   PATHOLOGY REPORTS yes   CHEMO & MEDICAL ONCOLOGY NOTES yes   CURRENT MEDICATION LIST yes   PREVIOUS RADIATION  DATE REQUESTED DATE RECEIVED   WHAT HEALTHCARE FACILITY All Rad Onc records are internal.    INITIAL HEALTH PROGRESS INTAKE     RADIATION ONCOLOGIST NOTES     RADIATION TREATMENT SUMMARY NOTES     RAD-TREATMENT PLAN     DVH = DOSE VOLUME HISTOGRAM     DICOM CD (TX PLANNING CT, TX PLAN, STRUCTURE SET)     *If no DICOM avail ask for DRRs          *Send all radiation Prior radiation records for both [facility] and [facility] Radiation Oncology patients to [facility] with  ATTN: MARCELA/Adelia Dosi/Physics

## 2022-10-28 ENCOUNTER — OFFICE VISIT (OUTPATIENT)
Dept: RADIATION ONCOLOGY | Facility: CLINIC | Age: 82
End: 2022-10-28
Attending: INTERNAL MEDICINE
Payer: COMMERCIAL

## 2022-10-28 ENCOUNTER — PRE VISIT (OUTPATIENT)
Dept: RADIATION ONCOLOGY | Facility: CLINIC | Age: 82
End: 2022-10-28

## 2022-10-28 VITALS
HEART RATE: 65 BPM | RESPIRATION RATE: 20 BRPM | SYSTOLIC BLOOD PRESSURE: 160 MMHG | BODY MASS INDEX: 38.74 KG/M2 | DIASTOLIC BLOOD PRESSURE: 75 MMHG | WEIGHT: 240 LBS | OXYGEN SATURATION: 99 % | TEMPERATURE: 98 F

## 2022-10-28 DIAGNOSIS — C50.911 INVASIVE DUCTAL CARCINOMA OF BREAST, FEMALE, RIGHT (H): ICD-10-CM

## 2022-10-28 PROCEDURE — 99205 OFFICE O/P NEW HI 60 MIN: CPT | Performed by: SURGERY

## 2022-10-28 RX ORDER — ALBUTEROL SULFATE 90 UG/1
2 AEROSOL, METERED RESPIRATORY (INHALATION)
COMMUNITY
Start: 2022-10-26

## 2022-10-28 RX ORDER — PREDNISONE 20 MG/1
40 TABLET ORAL
COMMUNITY
Start: 2022-10-26 | End: 2022-10-31

## 2022-10-28 ASSESSMENT — PAIN SCALES - GENERAL: PAINLEVEL: NO PAIN (0)

## 2022-10-28 NOTE — NURSING NOTE
INITIAL PATIENT ASSESSMENT      Diagnosis: breast cancer (right breast)    History of left breast cancer, s/p lumpectomy + radiation therapy and Anastrozole.    Prior radiation therapy:   Site Treated: left breast  Facility: [facility]  Dates: 2/19/2013 - 4/9/2013  Dose: 6,040 cGy    Prior chemotherapy: None    Prior hormonal therapy: Yes: Anastrozole 2013 - 2018    Pain Eval:  Denies    Psychosocial  Living arrangements: lives at home in a senior living apartment (independent living) in [place], presents to clinic with daughter Fatimah  Fall Risk: ambulates with assistive device - rolling walker  MIPS Falls Risk Screening Completed: Yes Result: Negative   referral needs: Not needed    Advanced Directive: Yes - Location: patient/daughter have copy  Implantable Cardiac Device: No  Authorization To Share Protected Health Information: YES - Date: 1/29/2013      Onset of menopause: patient reports history of hysterectomy  Abnormal vaginal bleeding/discharge: No  Are you pregnant? No  Urine Pregnancy Testing Needed: No, s/p hysterectomy      Review of Systems     Constitutional: Negative.    HENT: Positive for hearing loss. Negative for congestion, ear discharge, ear pain, nosebleeds, sinus pain, sore throat and tinnitus.         Bilateral hearing aides.   Eyes: Negative.    Respiratory: Positive for cough, shortness of breath and wheezing. Negative for hemoptysis, sputum production and stridor.         Patient reports currently on course of Prednisone and Albuterol inhaler.   Cardiovascular: Negative.    Gastrointestinal: Positive for diarrhea. Negative for abdominal pain, blood in stool, constipation, heartburn, melena, nausea and vomiting.        Patient reports loose stools at baseline, reports fructose and lactose intolerance.   Genitourinary: Negative.    Musculoskeletal: Negative.    Skin: Negative.    Neurological: Positive for tingling. Negative for dizziness, tremors, sensory  "change, speech change, focal weakness, seizures, loss of consciousness, weakness and headaches.        Patient reports neuropathy of bilateral feet at baseline and reports intermittent numbness/tingling of left hand related to \"years of knitting\".   Endo/Heme/Allergies: Negative.    Psychiatric/Behavioral: Negative.        Nurse face-to-face time: Level 5:  over 15 min face to face time.    Kimber Mcdaniel RN BSN OCN CBCN      "

## 2022-10-28 NOTE — PATIENT INSTRUCTIONS
Please contact [facility] RN with questions or concerns following today's appointment: [phone].    Thank you!

## 2022-10-28 NOTE — PROGRESS NOTES
Department of Radiation Oncology  [facility]    Health: Cancer Center  [facility]  [address] [place] [id number]  [phone]       Consultation Note    Name: Leida Goddard MRN: [id number]   : 1940   Date of Service: 10/28/2022  Referring: Dr. Rangel     Reason for consultation: right invasive ductal carcinoma, prior history of left breast cancer    History of Present Illness     Ms. Goddard is a 82 year old female presents with a RIGHT Stage IA ER+/NH+/Her2- IDC s/p BCS (, 22) with pathology revealing 1.7 cm invasive ductal carcinoma, grade 3, negative margins, no LVSI, no lymph nodes sampled, pT1CNx.  She will not be receiving any adjuvant chemotherapy but endocrine therapy in the adjuvant setting (Dr. Rangel).  Of note, patient does have a history of LEFT S tage IA invasive estrogen receptor positive lobular carcinoma diagnosed in  and underwent breast conservation surgery followed by adjuvant radiotherapy and adjuvant endocrine therapy.    Briefly, her oncologic history is as follows:    Patient was initially diagnosed with a left-sided invasive lobular carcinoma in .  She underwent breast conservation surgery in 2013, with final pathology pT1aNx, ER positive/NH positive/HER2 negative.  She underwent adjuvant radiotherapy to 50 Gray followed by 10 Martin boost with Dr. Perez.  She was then on endocrine therapy for a period of 5 years.    Most recently, she presented with right breast pain prompting further evaluation.  She underwent a screening mammogram on 2022 demonstrating a 1.5 cm asymmetry in the right breast, in the deep location.    She subsequently underwent mammogram and ultrasound on 2022.  Mammogram demonstrated a spiculated mass measuring 18 mm in size in the right breast, 9 o'clock position, at posterior depth.  This was characterized as a BI-RADS 5 abnormality.  Ultrasound  demonstrated a 1.5 cm spiculated hypoechoic mass at the 9 o'clock position in the right breast, without any evidence of lymphadenopathy.    She underwent biopsy on 8/11/2022 with pathology returning as invasive ductal carcinoma.    She underwent breast conservation surgery with  on 9/16/22, with pathology demonstrating 1.7 cm invasive ductal carcinoma, grade 3, negative margins, no LVSI, no lymph nodes sampled, pT1CNx.    She had met with medical oncology (Dr. Rangel) and will not be receiving any adjuvant chemotherapy but endocrine therapy in the adjuvant setting.     Today, patient denies any breast tenderness, pain, drainage, fevers, chills, extremity range of motion difficulties, chest pain, dyspnea, or weight loss.     Past Medical History:   Past Medical History:   Diagnosis Date     Breast cancer (H) 12/07/2012    Left breast     Breast cancer (H) 08/11/2022    Right Breast     Essential hypertension, benign     Hypertension, Benign     DALIA (obstructive sleep apnea)      S/P radiation therapy     6,040 cGy to left breast completed on 4/9/2013 [facility]     Type II or unspecified type diabetes mellitus without mention of complication, not stated as uncontrolled     Diabetes mellitus       Past Surgical History:   Past Surgical History:   Procedure Laterality Date     BREAST BIOPSY, CORE RT/LT Right 08/11/2022     CHOLECYSTECTOMY  01/01/1966     HYSTERECTOMY TOTAL ABDOMINAL, BILATERAL SALPINGO-OOPHORECTOMY, COMBINED      age 43     LUMPECTOMY BREAST  01/07/2013    Left breast     LUMPECTOMY BREAST Right 09/16/2022    Dr. Joseph     REPAIR ENTROPION Left 04/19/2021    Procedure: REPAIR, ENTROPION;  Surgeon: Susy Marie MD;  Location:  OR     SPINE SURGERY      Lumbar laminectomy     US BIOPSY PAROTID FINE NEEDLE ASPIRATION Right 02/08/2022    [place] Tumor     ZZC ANESTH,BLEPHAROPLASTY         Chemotherapy History:  Per HPI    Radiation History:  Per  "HPI    Pregnant: No  Implanted Cardiac Devices: No    Medications:  Current Outpatient Medications   Medication     albuterol (PROAIR HFA/PROVENTIL HFA/VENTOLIN HFA) 108 (90 Base) MCG/ACT inhaler     calcium citrate-vitamin D (CITRACAL) 315-200 MG-UNIT TABS per tablet     escitalopram (LEXAPRO) 10 MG tablet     furosemide (LASIX) 40 MG tablet     LEVOTHYROXINE SODIUM 25 MCG OR TABS     losartan (COZAAR) 100 MG tablet     magnesium 250 MG tablet     omeprazole (PRILOSEC) 20 MG DR capsule     potassium chloride ER (K-DUR/KLOR-CON M) 20 MEQ CR tablet     potassium chloride SA (K-DUR,KLOR-CON M) 20 MEQ tablet     predniSONE (DELTASONE) 20 MG tablet     PROPRANOLOL HCL PO     triamcinolone (KENALOG) 0.1 % external cream     vitamin D2 (ERGOCALCIFEROL) 65584 units (1250 mcg) capsule     fluticasone (FLONASE) 50 MCG/ACT nasal spray     No current facility-administered medications for this visit.         Allergies:     Allergies   Allergen Reactions     Metformin Nausea and Vomiting     Adhesive Tape Hives     Augmentin Diarrhea     Doxycycline Nausea and Vomiting     Food      Lactose Intolerant     Hctz Rash     Lisinopril Cough       Social History:  Tobacco: non smoker  Alcohol: occasional alcohol  Employment: retired       Family History:  Family History   Problem Relation Age of Onset     Cancer Mother 40        Uterine, diagnosed late \"40s\",  at age 83     Respiratory Mother         Emphysema     Cancer Father 50        prostate with bone mets,  age 91     Cancer Brother 80        prostate, radiation therapy, living     Cancer Maternal Grandmother         Cancer of the gallbladder     Heart Disease Maternal Grandfather      Cancer Paternal Grandmother         Breast, age unknown     Heart Disease Paternal Grandfather      Breast Cancer Paternal Aunt        Review of Systems   A 10-point review of systems was performed. Pertinent findings are noted in the HPI.    Physical Exam   ECOG Status: " 2    Vitals:  BP (!) 160/75 (BP Location: Left arm, Patient Position: Chair, Cuff Size: Adult Regular)   Pulse 65   Temp 98  F (36.7  C) (Oral)   Resp 20   Wt 108.9 kg (240 lb)   SpO2 99%   BMI 38.74 kg/m      Gen: Alert, in NAD  Head: NC/AT  Eyes: PERRL, EOMI, sclera anicteric  Ears: No external auricular lesions  Nose/sinus: No rhinorrhea or epistaxis  Breast: RIGHT breast incision healing well, incision CDI, no dehiscence, no upper extremity range of motion limitation; left breast well healed from prior surgery/RT, mild hyperpigemtation      Oral cavity/oropharynx: MMM, no visible oral cavity lesions  Neck: Full ROM, supple, no palpable adenopathy  Pulm: No wheezing, stridor or respiratory distress  CV: Extremities are warm and well-perfused, no cyanosis, no pedal edema  Abdominal: Normal bowel sounds, soft, nontender, no masses  Musculoskeletal: Normal bulk and tone  Skin: Normal color and turgor  Neuro: A/Ox3, CN II-XII intact, normal gait    Imaging/Path/Labs   Imaging: per HPI, personally reviewed and in agreement     Path: per HPI, personally reviewed and in agreement     Labs: per HPI, personally reviewed and in agreement     Assessment      Ms. Goddard is a 82 year old female presents with a RIGHT Stage IA ER+/AK+/Her2- IDC s/p BCS (, 9/16/22) with pathology revealing 1.7 cm invasive ductal carcinoma, grade 3, negative margins, no LVSI, no lymph nodes sampled, pT1CNx.  She will not be receiving any adjuvant chemotherapy but endocrine therapy in the adjuvant setting (Dr. Rangel).  Of note, patient does have a history of Stage IA left-sided invasive estrogen receptor positive lobular carcinoma diagnosed in 2012 and underwent breast conservation surgery followed by adjuvant radiotherapy and adjuvant endocrine therapy.    Whole Breast  We discussed the role of adjuvant whole breast radiotherapy after breast conservation surgery per NCCN guidelines. In patients with negative axillary lymph  nodes, WBRT +/- boost is standard of care.  In general, the benefits for whole breast radiation were reviewed and explained that adjuvant whole breast radiation following wide local excision decreases the risk of local recurrence by two-thirds to three-fourths and improves overall survival (Lars et al., NSABP B-06, [place], 2002; EBCTCG Metanalysis, Lancet 2011).      The options of conventional fractionation versus hypofractionation were reviewed.  However, we recommend hypofractionated radiation therapy based on results from the [place] hypofractionation trial (Fara et al, [place], 2010) and the update of the [place] START trial (Horacio et al, Lancet Oncol, 2013), which both show equivalent local control, survival and cosmesis with these shorter treatment schedules as compared to conventional radiation fractionation.    We also discussed the role of RT omission, given that this benefit is reduced for older patients. In the CALGB 9343 (MACARENA Henriquez 2013) trial, elibiity criteria for this trial was patients over the age of 70, T1 tumors  (up to 2cm in size), estrogen receptor positive, node negative, and with negative margins. Patient were randomized after breast conservation to tamoxifen alone versus tamoxifen plus radiation. The locoregional recurrence was 2% (with RT) versus 10% (without RT) at 10 years, but there was no difference in overall survival.    Further, we discussed the logistics of treatment planning and delivery in detail with the patient. We also discussed side effects, including short term risks of fatigue and skin reaction, and long term risks of pneumonitis, lung fibrosis, soft tissue fibrosis, skin changes, breast contour changes, rib fractures, cardiac damage, secondary cancers, lymphedema, and thyroid dysfunction. We described the use of 3D-conformal radiotherapy to minimize dose to the normal tissues, while adequately covering the target tissues, and the ability of this technique to decrease  potential for toxicity.       Plan     1. After extensive discussion, patient wishes to proceed RT omission.    2. Patient will receive endocrine therapy in the adjuvant setting.      All benefits and risks discussed, and patient is in agreement with the oncologic plan discussed above.     Thank you for allowing me to participate in your patient's care.  If you should require any additional information, please do not hesitate in contacting me.        Prasanna Michelle MD  Department of Radiation Oncology  [facility]

## 2022-10-28 NOTE — LETTER
10/28/2022         RE: Leida Goddard  [address]  [facility] [address]        Dear Colleague,    Thank you for referring your patient, Leida Goddard, to the [facility] RADIATION [facility]COLOG[facility] [place]. Please see a copy of my visit note below.       Department of Radiation Oncology  [facility]  [facility]  [address] [place] [address]  [phone]       Consultation Note    Name: Leida Goddard MRN: [id number]   : 1940   Date of Service: 10/28/2022  Referring: Dr. Rangel     Reason for consultation: right invasive ductal carcinoma, prior history of left breast cancer    History of Present Illness     Ms. Goddard is a 82 year old female presents with a RIGHT Stage IA ER+/UT+/Her2- IDC s/p BCS (, 22) with pathology revealing 1.7 cm invasive ductal carcinoma, grade 3, negative margins, no LVSI, no lymph nodes sampled, pT1CNx.  She will not be receiving any adjuvant chemotherapy but endocrine therapy in the adjuvant setting (Dr. Rangel).  Of note, patient does have a history of LEFT S tage IA invasive estrogen receptor positive lobular carcinoma diagnosed in  and underwent breast conservation surgery followed by adjuvant radiotherapy and adjuvant endocrine therapy.    Briefly, her oncologic history is as follows:    Patient was initially diagnosed with a left-sided invasive lobular carcinoma in .  She underwent breast conservation surgery in 2013, with final pathology pT1aNx, ER positive/UT positive/HER2 negative.  She underwent adjuvant radiotherapy to 50 Gray followed by 10 Martin boost with Dr. Perez.  She was then on endocrine therapy for a period of 5 years.    Most recently, she presented with right breast pain prompting further evaluation.  She underwent a screening mammogram on 2022 demonstrating a 1.5 cm asymmetry in the right breast, in the  deep location.    She subsequently underwent mammogram and ultrasound on 8/4/2022.  Mammogram demonstrated a spiculated mass measuring 18 mm in size in the right breast, 9 o'clock position, at posterior depth.  This was characterized as a BI-RADS 5 abnormality.  Ultrasound demonstrated a 1.5 cm spiculated hypoechoic mass at the 9 o'clock position in the right breast, without any evidence of lymphadenopathy.    She underwent biopsy on 8/11/2022 with pathology returning as invasive ductal carcinoma.    She underwent breast conservation surgery with  on 9/16/22, with pathology demonstrating 1.7 cm invasive ductal carcinoma, grade 3, negative margins, no LVSI, no lymph nodes sampled, pT1CNx.    She had met with medical oncology (Dr. Rangel) and will not be receiving any adjuvant chemotherapy but endocrine therapy in the adjuvant setting.     Today, patient denies any breast tenderness, pain, drainage, fevers, chills, extremity range of motion difficulties, chest pain, dyspnea, or weight loss.     Past Medical History:   Past Medical History:   Diagnosis Date     Breast cancer (H) 12/07/2012    Left breast     Breast cancer (H) 08/11/2022    Right Breast     Essential hypertension, benign     Hypertension, Benign     DALIA (obstructive sleep apnea)      S/P radiation therapy     6,040 cGy to left breast completed on 4/9/2013 - [facility]     Type II or unspecified type diabetes mellitus without mention of complication, not stated as uncontrolled     Diabetes mellitus       Past Surgical History:   Past Surgical History:   Procedure Laterality Date     BREAST BIOPSY, CORE RT/LT Right 08/11/2022     CHOLECYSTECTOMY  01/01/1966     HYSTERECTOMY TOTAL ABDOMINAL, BILATERAL SALPINGO-OOPHORECTOMY, COMBINED      age 43     LUMPECTOMY BREAST  01/07/2013    Left breast     LUMPECTOMY BREAST Right 09/16/2022    Dr. Joseph     REPAIR ENTROPION Left 04/19/2021    Procedure: REPAIR, ENTROPION;   "Surgeon: Susy Marie MD;  Location: [facility] OR     SPINE SURGERY      Lumbar laminectomy     US BIOPSY PAROTID FINE NEEDLE ASPIRATION Right 2022    [place] Tumor     ZZC ANESTH,BLEPHAROPLASTY         Chemotherapy History:  Per HPI    Radiation History:  Per HPI    Pregnant: No  Implanted Cardiac Devices: No    Medications:  Current Outpatient Medications   Medication     albuterol (PROAIR HFA/PROVENTIL HFA/VENTOLIN HFA) 108 (90 Base) MCG/ACT inhaler     calcium citrate-vitamin D (CITRACAL) 315-200 MG-UNIT TABS per tablet     escitalopram (LEXAPRO) 10 MG tablet     furosemide (LASIX) 40 MG tablet     LEVOTHYROXINE SODIUM 25 MCG OR TABS     losartan (COZAAR) 100 MG tablet     magnesium 250 MG tablet     omeprazole (PRILOSEC) 20 MG DR capsule     potassium chloride ER (K-DUR/KLOR-CON M) 20 MEQ CR tablet     potassium chloride SA (K-DUR,KLOR-CON M) 20 MEQ tablet     predniSONE (DELTASONE) 20 MG tablet     PROPRANOLOL HCL PO     triamcinolone (KENALOG) 0.1 % external cream     vitamin D2 (ERGOCALCIFEROL) 69353 units (1250 mcg) capsule     fluticasone (FLONASE) 50 MCG/ACT nasal spray     No current facility-administered medications for this visit.         Allergies:     Allergies   Allergen Reactions     Metformin Nausea and Vomiting     Adhesive Tape Hives     Augmentin Diarrhea     Doxycycline Nausea and Vomiting     Food      Lactose Intolerant     Hctz Rash     Lisinopril Cough       Social History:  Tobacco: non smoker  Alcohol: occasional alcohol  Employment: retired       Family History:  Family History   Problem Relation Age of Onset     Cancer Mother 40        Uterine, diagnosed late \"40s\",  at age 83     Respiratory Mother         Emphysema     Cancer Father 50        prostate with bone mets,  age 91     Cancer Brother 80        prostate, radiation therapy, living     Cancer Maternal Grandmother         Cancer of the gallbladder     Heart Disease Maternal Grandfather      Cancer Paternal " Grandmother         Breast, age unknown     Heart Disease Paternal Grandfather      Breast Cancer Paternal Aunt        Review of Systems   A 10-point review of systems was performed. Pertinent findings are noted in the HPI.    Physical Exam   ECOG Status: 2    Vitals:  BP (!) 160/75 (BP Location: Left arm, Patient Position: Chair, Cuff Size: Adult Regular)   Pulse 65   Temp 98  F (36.7  C) (Oral)   Resp 20   Wt 108.9 kg (240 lb)   SpO2 99%   BMI 38.74 kg/m      Gen: Alert, in NAD  Head: NC/AT  Eyes: PERRL, EOMI, sclera anicteric  Ears: No external auricular lesions  Nose/sinus: No rhinorrhea or epistaxis  Breast: RIGHT breast incision healing well, incision CDI, no dehiscence, no upper extremity range of motion limitation; left breast well healed from prior surgery/RT, mild hyperpigemtation      Oral cavity/oropharynx: MMM, no visible oral cavity lesions  Neck: Full ROM, supple, no palpable adenopathy  Pulm: No wheezing, stridor or respiratory distress  CV: Extremities are warm and well-perfused, no cyanosis, no pedal edema  Abdominal: Normal bowel sounds, soft, nontender, no masses  Musculoskeletal: Normal bulk and tone  Skin: Normal color and turgor  Neuro: A/Ox3, CN II-XII intact, normal gait    Imaging/Path/Labs   Imaging: per HPI, personally reviewed and in agreement     Path: per HPI, personally reviewed and in agreement     Labs: per HPI, personally reviewed and in agreement     Assessment      Ms. Goddard is a 82 year old female presents with a RIGHT Stage IA ER+/MO+/Her2- IDC s/p BCS (, 9/16/22) with pathology revealing 1.7 cm invasive ductal carcinoma, grade 3, negative margins, no LVSI, no lymph nodes sampled, pT1CNx.  She will not be receiving any adjuvant chemotherapy but endocrine therapy in the adjuvant setting (Dr. Rangel).  Of note, patient does have a history of Stage IA left-sided invasive estrogen receptor positive lobular carcinoma diagnosed in 2012 and underwent breast  conservation surgery followed by adjuvant radiotherapy and adjuvant endocrine therapy.    Whole Breast  We discussed the role of adjuvant whole breast radiotherapy after breast conservation surgery per NCCN guidelines. In patients with negative axillary lymph nodes, WBRT +/- boost is standard of care.  In general, the benefits for whole breast radiation were reviewed and explained that adjuvant whole breast radiation following wide local excision decreases the risk of local recurrence by two-thirds to three-fourths and improves overall survival (Sousa et al., NSABP B-06, [place], 2002; EBCTCG Metanalysis, Lancet 2011).      The options of conventional fractionation versus hypofractionation were reviewed.  However, we recommend hypofractionated radiation therapy based on results from the [place] hypofractionation trial (Fara et al, [place], 2010) and the update of the [place] START trial (Horacio et al, Lancet Oncol, 2013), which both show equivalent local control, survival and cosmesis with these shorter treatment schedules as compared to conventional radiation fractionation.    We also discussed the role of RT omission, given that this benefit is reduced for older patients. In the CALGB 9343 ([place], JCO 2013) trial, elibiity criteria for this trial was patients over the age of 70, T1 tumors  (up to 2cm in size), estrogen receptor positive, node negative, and with negative margins. Patient were randomized after breast conservation to tamoxifen alone versus tamoxifen plus radiation. The locoregional recurrence was 2% (with RT) versus 10% (without RT) at 10 years, but there was no difference in overall survival.    Further, we discussed the logistics of treatment planning and delivery in detail with the patient. We also discussed side effects, including short term risks of fatigue and skin reaction, and long term risks of pneumonitis, lung fibrosis, soft tissue fibrosis, skin changes, breast contour changes, rib fractures,  cardiac damage, secondary cancers, lymphedema, and thyroid dysfunction. We described the use of 3D-conformal radiotherapy to minimize dose to the normal tissues, while adequately covering the target tissues, and the ability of this technique to decrease potential for toxicity.       Plan     1. After extensive discussion, patient wishes to proceed RT omission.    2. Patient will receive endocrine therapy in the adjuvant setting.      All benefits and risks discussed, and patient is in agreement with the oncologic plan discussed above.     Thank you for allowing me to participate in your patient's care.  If you should require any additional information, please do not hesitate in contacting me.        Prasanna Michelle MD  Department of Radiation Oncology  [facility]         Again, thank you for allowing me to participate in the care of your patient.        Sincerely,        Prasanna Michelle MD

## 2022-11-14 ENCOUNTER — VIRTUAL VISIT (OUTPATIENT)
Dept: ONCOLOGY | Facility: CLINIC | Age: 82
End: 2022-11-14
Attending: INTERNAL MEDICINE
Payer: COMMERCIAL

## 2022-11-14 DIAGNOSIS — C50.911 INVASIVE DUCTAL CARCINOMA OF BREAST, FEMALE, RIGHT (H): Primary | ICD-10-CM

## 2022-11-14 PROCEDURE — 99214 OFFICE O/P EST MOD 30 MIN: CPT | Mod: 95 | Performed by: INTERNAL MEDICINE

## 2022-11-14 RX ORDER — TAMOXIFEN CITRATE 20 MG/1
20 TABLET ORAL DAILY
Qty: 30 TABLET | Refills: 6 | Status: SHIPPED | OUTPATIENT
Start: 2022-11-14 | End: 2024-07-19

## 2022-11-14 NOTE — LETTER
11/14/2022         RE: Leida Goddard  [id number] [address] [place] [address] [address]  [facility] [address]        Dear Colleague,    Thank you for referring your patient, Leida Goddard, to the [facility]. Please see a copy of my visit note below.    Leida is a 82 year old who is being evaluated via a billable video visit.  Currently in [place].    How would you like to obtain your AVS? MyChart  If the video visit is dropped, the invitation should be resent by: Text to cell phone: [phone]  Will anyone else be joining your video visit? No     CAREN Garcia      Video-Visit Details    Video Start Time: 11/14/22 10:16 AM    Type of service:  Video Visit    Video End Time: 11/14/22 10:35 AM    Originating Location (pt. Location):home         Distant Location (provider location):  On site [facility]    Platform used for Video Visit: Well        [facility]    Hematology/Oncology Established Patient Follow-up Note      Today's Date: 11/14/2022     Reason for Follow-up: RIGHT invasive ductal carcinoma, pT1c Nx      HISTORY OF PRESENT ILLNESS: Leida Goddard is a 82 year old female who presents for follow up regarding RIGHT invasive ductal carcinoma, pT1c Nx    Patient's medical history includes LEFT pT1a (M) NX multifocal microscopic invasive lobular carcinoma, ER positive, MO positive, HER2 negative on FISH s/p lumpectomy, RT, anastrazole (2012), osteoporosis on 7/22 DEXA (diagnosis 9/2017, on zometa at [facility]), 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  Patient is s/p hysterectomy and BSO.     Patient's previous work-up was done at [facility].  I have reviewed the records in care  everywhere and they are summarized below.     Regarding LEFT breast cancer :   -2012-screening mammogram showed grouped calcifications in the left breast at 7:00 posterior depth, increased in number, confirmed on ultrasound  - 2012-stereotactic biopsy with clip placement, pathology showed LCIS with fibroadenomatoid changes with microcalcifications  - 2013- left breast lumpectomy with pathology showin microscopic foci of invasive lobular carcinoma, largest focus 0.11 cm others 0.1 cm, 0.08 cm, 0.05 cm, overall grade 2, margins negative, multiple scattered foci of LCIS within the specimen, no DCIS, no LVI, no lymph nodes submitted.  %, UT 80%, HER2 2+ on IHC, FISH negative.  Pathological staging AJCC seventh edition pT1a (M) NX  - I cannot find specific detailed records of this however notes state that patient received postlumpectomy radiation  -And anastrozole 1 mg daily, pt reports she took anastroazole for 5 yerars  -Patient followed with medical oncologist Dr. Heydi Queen     Regarding recently diagnosed RIGHT breast cancer:  -- pt felt right breast shooting pains radiating to nipple, exacerbated by crocheting     - bilateral screening mammogram- heterogeneously dense breast, postsurgical changes in the left breast, scattered calcifications in right breast.  1.5 cm asymmetry, in line with the nipple, deep within the right breast, needs further evaluation     - - right mammogram: Spiculated mass measuring 18 mm in greatest dimension with surrounding architectural distortion, at 9 o'clock position posterior depth, scattered benign calcifications     -- right breast ultrasound: Spiculated hypoechoic mass at the 9 o'clock position posterior depth measuring 1.5 cm in greatest dimension, right axilla no abnormal lymph nodes, BI-RADS 5     - - ultrasound-guided biopsy of right breast lesion with clip placement- 5x14 gauge core biopsies obtained  --- Pathology is not available  at this time for review     -9/16/2022-right breast lumpectomy with second excision for involved margins with pathology showing:  A.  Right breast tissue, radioactive seed guided lumpectomy-Invasive ductal carcinoma, involving inferior and medial margins.  B.  Right breast inferior medial margin, excision-Invasive ductal carcinoma. New inferior and medial margins are free.  ---Invasive ductal carcinoma, grade 3, 1.7 cm, no LVI, dermal lymphovascular invasion N/A, all margins negative for invasive carcinoma (0.55 cm to new inferior margin on specimen B), regional lymph node status N/A (no lymph nodes submitted or found)  ---ER 90 to 100%  --- IA 80 to 90%  --- HER2 zero on IHC  --- Pathologic stage classification (AJCC eighth edition) pT1c NX     Microscopic Description      A.  Sections demonstrate breast parenchyma with tumor mass composed of sheets and cords of markedly pleomorphic cells with scattered mitotic figures, including locally increased mitotic activity (T3 N3 M2).  The inferior and medial margins are involved by tumor.  There is prominent perineural invasion.  B.  Sections demonstrate breast parenchyma with invasive ductal carcinoma similar to that in specimen A.  The closest margin is inferior and measures 0.55 cm.      -9/22 CBC, BMP reviewed       INTERIM HISTORY:  -Patient's visit was changed to a virtual visit due to snowstorm, patient unable to drive to appointment. Patient reports she is feeling great.  Patient denies any significant breast pain since her lumpectomy.  Patient denies weight loss.  Patient denies history of blood clots for herself    -10/22 radiation oncology consultation with Dr. Michelle: Based on CALGB 9343 (MACARENA Henriquez 2013) trial, will omit radiation therapy    -11/22 Oncotype DX breast recurrence score: 18  Given patient is clinically node-negative based on ultrasound, pathologically Nx, the following information available:  A) node-negative: Distant recurrence risk at 9 years  with endocrine therapy alone 5%, group average absolute chemotherapy benefit in patients over 50 years old less than 1%  B) micrometastases and node positive (1-3): Distant recurrence at 9 years with endocrine therapy alone 16%, chemotherapy benefit for this group cannot be excluded (see registry outcomes in patients treated without chemotherapy with recurrence score 16-20= 96.7%, versus recurrence score 0-10= 98.2%)  C) node positive (>/= 4), distant recurrence at 9 years with endocrine therapy alone: 50%        REVIEW OF SYSTEMS:   A 14 point ROS was reviewed with pertinent positives and negatives in the HPI.       HOME MEDICATIONS:  Current Outpatient Medications   Medication Sig Dispense Refill     albuterol (PROAIR HFA/PROVENTIL HFA/VENTOLIN HFA) 108 (90 Base) MCG/ACT inhaler Inhale 2 puffs into the lungs       calcium citrate-vitamin D (CITRACAL) 315-200 MG-UNIT TABS per tablet Take 1 tablet by mouth daily       escitalopram (LEXAPRO) 10 MG tablet Take 10 mg by mouth daily       fluticasone (FLONASE) 50 MCG/ACT nasal spray Spray 1 spray into both nostrils 2 times daily (Patient not taking: Reported on 10/28/2022)       furosemide (LASIX) 40 MG tablet Take 20 mg by mouth daily        LEVOTHYROXINE SODIUM 25 MCG OR TABS 1 TABLET DAILY       losartan (COZAAR) 100 MG tablet Take 100 mg by mouth daily.       magnesium 250 MG tablet Take 1 tablet by mouth 2 times daily       omeprazole (PRILOSEC) 20 MG DR capsule Take 20 mg by mouth daily       potassium chloride ER (K-DUR/KLOR-CON M) 20 MEQ CR tablet Take 20 mEq by mouth 2 times daily       potassium chloride SA (K-DUR,KLOR-CON M) 20 MEQ tablet Take  by mouth daily.       PROPRANOLOL HCL PO Take 40 mg by mouth 2 times daily.       triamcinolone (KENALOG) 0.1 % external cream Apply twice daily as needed to the back, up to two weeks on at one time. 80 g 11     vitamin D2 (ERGOCALCIFEROL) 99217 units (1250 mcg) capsule Take 50,000 Units by mouth once a week            ALLERGIES:  Allergies   Allergen Reactions     Metformin Nausea and Vomiting     Adhesive Tape Hives     Augmentin Diarrhea     Doxycycline Nausea and Vomiting     Food      Lactose Intolerant     Hctz Rash     Lisinopril Cough         PAST MEDICAL HISTORY:  Past Medical History:   Diagnosis Date     Breast cancer (H) 12/07/2012    Left breast     Breast cancer (H) 08/11/2022    Right Breast     Essential hypertension, benign     Hypertension, Benign     DALIA (obstructive sleep apnea)      S/P radiation therapy     6,040 cGy to left breast completed on 4/9/2013 [facility]     Type II or unspecified type diabetes mellitus without mention of complication, not stated as uncontrolled     Diabetes mellitus   LEFT pT1a (M) NX multifocal microscopic invasive lobular carcinoma, ER positive, NE positive, HER2 negative on FISH s/p lumpectomy, RT, anastrazole (2012), osteoporosis on 7/22 DEXA (diagnosis 9/2017, Fosamax held due to myalgias, ?IV bisphophonate therapy at [facility]), 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      PAST SURGICAL HISTORY:  Past Surgical History:   Procedure Laterality Date     BREAST BIOPSY, CORE RT/LT Right 08/11/2022     CHOLECYSTECTOMY  01/01/1966     HYSTERECTOMY TOTAL ABDOMINAL, BILATERAL SALPINGO-OOPHORECTOMY, COMBINED      age 43     LUMPECTOMY BREAST  01/07/2013    Left breast     LUMPECTOMY BREAST Right 09/16/2022    Dr. Joseph     REPAIR ENTROPION Left 04/19/2021    Procedure: REPAIR, ENTROPION;  Surgeon: Susy Marie MD;  Location: [facility] OR     SPINE SURGERY      Lumbar laminectomy     US BIOPSY PAROTID FINE NEEDLE ASPIRATION Right 02/08/2022    [place] Tumor     ZZC ANESTH,BLEPHAROPLASTY       Right  "total hip arthroplasty  Left total knee arthroplasty    SOCIAL HISTORY:  Social History     Socioeconomic History     Marital status:      Spouse name: Not on file     Number of children: Not on file     Years of education: Not on file     Highest education level: Not on file   Occupational History     Not on file   Tobacco Use     Smoking status: Never     Smokeless tobacco: Never   Substance and Sexual Activity     Alcohol use: No     Drug use: No     Sexual activity: Not Currently     Partners: Male   Other Topics Concern     Parent/sibling w/ CABG, MI or angioplasty before 65F 55M? Not Asked   Social History Narrative     Not on file     Social Determinants of Health     Financial Resource Strain: Not on file   Food Insecurity: Not on file   Transportation Needs: Not on file   Physical Activity: Not on file   Stress: Not on file   Social Connections: Not on file   Intimate Partner Violence: Not on file   Housing Stability: Not on file         FAMILY HISTORY:  Family History   Problem Relation Age of Onset     Cancer Mother 40        Uterine, diagnosed late \"40s\",  at age 83     Respiratory Mother         Emphysema     Cancer Father 50        prostate with bone mets,  age 91     Cancer Brother 80        prostate, radiation therapy, living     Cancer Maternal Grandmother         Cancer of the gallbladder     Heart Disease Maternal Grandfather      Cancer Paternal Grandmother         Breast, age unknown     Heart Disease Paternal Grandfather      Breast Cancer Paternal Aunt      Patient denies history of blood clots for herself, states that her family history of blood clots is related to her sister who was admitted for a hysterectomy followed by complications of peritonitis and \"blood clots throughout her whole body\" which was eventually fatal.      PHYSICAL EXAM:  Vital signs:  There were no vitals taken for this visit.   ECO  GENERAL/CONSTITUTIONAL: No acute distress. Healthy, " alert.  EYES: No scleral icterus.  No redness or discharge.    RESPIRATORY: No audible wheeze or cough. No visible cyanosis.  No visible retractions or increased work of breathing.  Able to speak in complete sentences without distress.  MUSCULOSKELETAL: Normal range of motion of visible extremities.  NEUROLOGIC: Alert, oriented to person, place and time, answers questions appropriately. No tremor. Mentation intact and speech normal  INTEGUMENTARY: No jaundice.  No obvious rash or skin lesions.  PSYCHIATRIC:  Mentation appears normal, affect normal, judgement and insight intact, normal speech and appearance, well-groomed.    The rest of a comprehensive physical exam is deferred due to video visit restrictions.      LABS:  CBC RESULTS: No results for input(s): WBC, RBC, HGB, HCT, MCV, MCH, MCHC, RDW, PLT in the last 34195 hours.    Recent Labs   Lab Test 10/13/22  1157 11/14/17  0906 02/25/15  1158 10/21/14  0822     --   --  141   POTASSIUM 4.0  --   --  3.9   CHLORIDE 110*  --   --  107   CO2 30  --   --  27   ANIONGAP 2*  --   --  7   *  --   --  140*   BUN 15  --   --  16   CR 0.70  --   --  0.68   JAZMYNE 8.7 8.7   < > 9.1    < > = values in this interval not displayed.         PATHOLOGY:      IMAGING:      ASSESSMENT/PLAN:  Leida Goddard is a 82 year old female with:      #RIGHT invasive ductal carcinoma, pT1c Nx  - ER positive (%), MO positive (80-90%), HER2 0 on IHC  - 9/2022 right breast lumpectomy at [facility]  - Pathology consultation pending  -10/22 radiation oncology consultation with Dr. Michelle: Based on CALGB 9343 (MACARENA Henriquez 2013) trial, will omit radiation therapy  -11/22 Oncotype DX breast recurrence score: 18  Given patient is clinically node-negative based on ultrasound, pathologically Nx, the following information available:  A) node-negative: Distant recurrence risk at 9 years with endocrine therapy alone 5%, group average absolute chemotherapy benefit in patients over 50  years old less than 1%  B) micrometastases and node positive (1-3): Distant recurrence at 9 years with endocrine therapy alone 16%, chemotherapy benefit for this group cannot be excluded (SEER registry outcomes in patients treated without chemotherapy with recurrence score 16-20= 96.7%)  C) node positive (>/= 4), distant recurrence at 9 years with endocrine therapy alone: 50%  - The above Oncotype results were discussed, including the fact that we have no suspicious lymph nodes clinically based on ultrasound imaging and physical exam but no pathological proof of node-negative disease.  Patient is aware that there may be a possibility of at least micrometastatic disease within the lymph nodes. Risks, benefits of chemotherapy discussed. Given the above information, patient does not wish to proceed with any form of chemotherapy.  - We will proceed with adjuvant endocrine therapy for at least 5 years at this time and do breast cancer index testing prior to discontinuing.  - Pt has significant osteoporosis therefore will start tamoxifen (of note pt has had hysterectomy), s/s of VTE discussed, pt will have annual eye exam  - 10/22 CMP LFTs normal  - pt is currently on escitalopram, pt cannot be on selective serotonin reuptake inhibitor and tamoxifen at the same time because selective serotonin reuptake inhibitor can decrease the formation of active metabolites of tamoxifen and impact its efficacy. SNRIs appear to have minimal impact on tamoxifen metabolism and would be a good option.  - pt will contact her PCP Dr. Samantha Munguia to be switched to different antidepressant, pt instructed to call me if she is unable to reach Dr. Munguia or if any issues at all  - rx for tamoxifen sent to pts preferred pharmacy with 6 refills but pt knows not to start until she is off escitalopram/on non-selective serotonin reuptake inhibitor antidepressant   - next mammogram due 7/2023    #LEFT breast cancer multifocal microscopic invasive  ductal  carcinoma, pT1a (M) NX (2012)  -ER positive, DE positive, HER2 negative on FISH   -s/p lumpectomy, RT, anastrazole 1mg from at least 1/8/14 to 1/19/18  -Patient has had bilateral breast cancer, recommend genetic counseling as this may be relevant for patient's family (1 daughter Fatimah, adopted; 1 sister passed; pt has several nieces and grand daughters she is concerned about)     #Osteoporosis  - osteoporosis on 7/22 DEXA, next DEXA due 7/2024  - diagnosis 9/2017, Fosamax held due to myalgias, thinks she got IV medication for osteoporosis in 7/22 at [facility]   - Patient was given Zometa 5 mg on 8/8/2022 with Dr. Samantha Grissom, can continue treatment w/PCP  - 10/22 CMP Creatinine 0.70   - Pt has upper and lower dentures, no native teeth, no upcoming dental work      RTC 6 months for f/u with me with labs before visit       Rhonda Germain DO  Hematology/Oncology  [facility]    Future Appointments   Date Time Provider Department Center   11/14/2022 10:15 AM Rhonda Germain DO [facility]          Again, thank you for allowing me to participate in the care of your patient.        Sincerely,        RHONDA GERMAIN DO

## 2022-11-14 NOTE — PROGRESS NOTES
Leida is a 82 year old who is being evaluated via a billable video visit.  Currently in [place].    How would you like to obtain your AVS? Kikohart  If the video visit is dropped, the invitation should be resent by: Text to cell phone: [phone]  Will anyone else be joining your video visit? CAREN Ruby      Video-Visit Details    Video Start Time: 11/14/22 10:16 AM    Type of service:  Video Visit    Video End Time: 11/14/22 10:35 AM    Originating Location (pt. Location):home         Distant Location (provider location):  On site [facility]    Platform used for Video Visit: Well        [facility]    Hematology/Oncology Established Patient Follow-up Note      Today's Date: 11/14/2022     Reason for Follow-up: RIGHT invasive ductal carcinoma, pT1c Nx      HISTORY OF PRESENT ILLNESS: Leida Goddard is a 82 year old female who presents for follow up regarding RIGHT invasive ductal carcinoma, pT1c Nx    Patient's medical history includes LEFT pT1a (M) NX multifocal microscopic invasive lobular carcinoma, ER positive, SD positive, HER2 negative on FISH s/p lumpectomy, RT, anastrazole (2012), osteoporosis on 7/22 DEXA (diagnosis 9/2017, on zometa at [facility]), 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  Patient is s/p hysterectomy and BSO.     Patient's previous work-up was done at [facility].  I have reviewed the records in care everywhere and they are summarized below.     Regarding LEFT breast cancer 2012:   -11/2012-screening mammogram showed grouped calcifications in the left breast at 7:00 posterior depth, increased in number, confirmed on ultrasound  - 12/2012-stereotactic biopsy with clip  placement, pathology showed LCIS with fibroadenomatoid changes with microcalcifications  - 2013- left breast lumpectomy with pathology showin microscopic foci of invasive lobular carcinoma, largest focus 0.11 cm others 0.1 cm, 0.08 cm, 0.05 cm, overall grade 2, margins negative, multiple scattered foci of LCIS within the specimen, no DCIS, no LVI, no lymph nodes submitted.  %, RI 80%, HER2 2+ on IHC, FISH negative.  Pathological staging AJCC seventh edition pT1a (M) NX  - I cannot find specific detailed records of this however notes state that patient received postlumpectomy radiation  -And anastrozole 1 mg daily, pt reports she took anastroazole for 5 yerars  -Patient followed with medical oncologist Dr. Heydi Queen     Regarding recently diagnosed RIGHT breast cancer:  -- pt felt right breast shooting pains radiating to nipple, exacerbated by crocheting     - bilateral screening mammogram- heterogeneously dense breast, postsurgical changes in the left breast, scattered calcifications in right breast.  1.5 cm asymmetry, in line with the nipple, deep within the right breast, needs further evaluation     - - right mammogram: Spiculated mass measuring 18 mm in greatest dimension with surrounding architectural distortion, at 9 o'clock position posterior depth, scattered benign calcifications     -- right breast ultrasound: Spiculated hypoechoic mass at the 9 o'clock position posterior depth measuring 1.5 cm in greatest dimension, right axilla no abnormal lymph nodes, BI-RADS 5     - - ultrasound-guided biopsy of right breast lesion with clip placement- 5x14 gauge core biopsies obtained  --- Pathology is not available at this time for review     -2022-right breast lumpectomy with second excision for involved margins with pathology showing:  A.  Right breast tissue, radioactive seed guided lumpectomy-Invasive ductal carcinoma, involving inferior and medial margins.  B.  Right  breast inferior medial margin, excision-Invasive ductal carcinoma. New inferior and medial margins are free.  ---Invasive ductal carcinoma, grade 3, 1.7 cm, no LVI, dermal lymphovascular invasion N/A, all margins negative for invasive carcinoma (0.55 cm to new inferior margin on specimen B), regional lymph node status N/A (no lymph nodes submitted or found)  ---ER 90 to 100%  --- MT 80 to 90%  --- HER2 zero on IHC  --- Pathologic stage classification (AJCC eighth edition) pT1c NX     Microscopic Description      A.  Sections demonstrate breast parenchyma with tumor mass composed of sheets and cords of markedly pleomorphic cells with scattered mitotic figures, including locally increased mitotic activity (T3 N3 M2).  The inferior and medial margins are involved by tumor.  There is prominent perineural invasion.  B.  Sections demonstrate breast parenchyma with invasive ductal carcinoma similar to that in specimen A.  The closest margin is inferior and measures 0.55 cm.      -9/22 CBC, BMP reviewed       INTERIM HISTORY:  -Patient's visit was changed to a virtual visit due to snowstorm, patient unable to drive to appointment. Patient reports she is feeling great.  Patient denies any significant breast pain since her lumpectomy.  Patient denies weight loss.  Patient denies history of blood clots for herself    -10/22 radiation oncology consultation with Dr. Michelle: Based on CALGB 9343 ([place], [facility] 2013) trial, will omit radiation therapy    -11/22 Oncotype DX breast recurrence score: 18  Given patient is clinically node-negative based on ultrasound, pathologically Nx, the following information available:  A) node-negative: Distant recurrence risk at 9 years with endocrine therapy alone 5%, group average absolute chemotherapy benefit in patients over 50 years old less than 1%  B) micrometastases and node positive (1-3): Distant recurrence at 9 years with endocrine therapy alone 16%, chemotherapy benefit for this group  cannot be excluded (see registry outcomes in patients treated without chemotherapy with recurrence score 16-20= 96.7%, versus recurrence score 0-10= 98.2%)  C) node positive (>/= 4), distant recurrence at 9 years with endocrine therapy alone: 50%        REVIEW OF SYSTEMS:   A 14 point ROS was reviewed with pertinent positives and negatives in the HPI.       HOME MEDICATIONS:  Current Outpatient Medications   Medication Sig Dispense Refill     albuterol (PROAIR HFA/PROVENTIL HFA/VENTOLIN HFA) 108 (90 Base) MCG/ACT inhaler Inhale 2 puffs into the lungs       calcium citrate-vitamin D (CITRACAL) 315-200 MG-UNIT TABS per tablet Take 1 tablet by mouth daily       escitalopram (LEXAPRO) 10 MG tablet Take 10 mg by mouth daily       fluticasone (FLONASE) 50 MCG/ACT nasal spray Spray 1 spray into both nostrils 2 times daily (Patient not taking: Reported on 10/28/2022)       furosemide (LASIX) 40 MG tablet Take 20 mg by mouth daily        LEVOTHYROXINE SODIUM 25 MCG OR TABS 1 TABLET DAILY       losartan (COZAAR) 100 MG tablet Take 100 mg by mouth daily.       magnesium 250 MG tablet Take 1 tablet by mouth 2 times daily       omeprazole (PRILOSEC) 20 MG DR capsule Take 20 mg by mouth daily       potassium chloride ER (K-DUR/KLOR-CON M) 20 MEQ CR tablet Take 20 mEq by mouth 2 times daily       potassium chloride SA (K-DUR,KLOR-CON M) 20 MEQ tablet Take  by mouth daily.       PROPRANOLOL HCL PO Take 40 mg by mouth 2 times daily.       triamcinolone (KENALOG) 0.1 % external cream Apply twice daily as needed to the back, up to two weeks on at one time. 80 g 11     vitamin D2 (ERGOCALCIFEROL) 36433 units (1250 mcg) capsule Take 50,000 Units by mouth once a week           ALLERGIES:  Allergies   Allergen Reactions     Metformin Nausea and Vomiting     Adhesive Tape Hives     Augmentin Diarrhea     Doxycycline Nausea and Vomiting     Food      Lactose Intolerant     Hctz Rash     Lisinopril Cough         PAST MEDICAL HISTORY:  Past  Medical History:   Diagnosis Date     Breast cancer (H) 12/07/2012    Left breast     Breast cancer (H) 08/11/2022    Right Breast     Essential hypertension, benign     Hypertension, Benign     DALIA (obstructive sleep apnea)      S/P radiation therapy     6,040 cGy to left breast completed on 4/9/2013 [facility]     Type II or unspecified type diabetes mellitus without mention of complication, not stated as uncontrolled     Diabetes mellitus   LEFT pT1a (M) NX multifocal microscopic invasive lobular carcinoma, ER positive, IA positive, HER2 negative on FISH s/p lumpectomy, RT, anastrazole (2012), osteoporosis on 7/22 DEXA (diagnosis 9/2017, Fosamax held due to myalgias, ?IV bisphophonate therapy at 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      PAST SURGICAL HISTORY:  Past Surgical History:   Procedure Laterality Date     BREAST BIOPSY, CORE RT/LT Right 08/11/2022     CHOLECYSTECTOMY  01/01/1966     HYSTERECTOMY TOTAL ABDOMINAL, BILATERAL SALPINGO-OOPHORECTOMY, COMBINED      age 43     LUMPECTOMY BREAST  01/07/2013    Left breast     LUMPECTOMY BREAST Right 09/16/2022    Dr. Joseph     REPAIR ENTROPION Left 04/19/2021    Procedure: REPAIR, ENTROPION;  Surgeon: Susy Marie MD;  Location: [facility] OR     SPINE SURGERY      Lumbar laminectomy     US BIOPSY PAROTID FINE NEEDLE ASPIRATION Right 02/08/2022    [place] Tumor     ZZC ANESTH,BLEPHAROPLASTY       Right total hip arthroplasty  Left total knee arthroplasty    SOCIAL HISTORY:  Social History     Socioeconomic History     Marital status:      Spouse name: Not on file     Number of children: Not on file     Years of education: Not on file     Highest education level: Not  "on file   Occupational History     Not on file   Tobacco Use     Smoking status: Never     Smokeless tobacco: Never   Substance and Sexual Activity     Alcohol use: No     Drug use: No     Sexual activity: Not Currently     Partners: Male   Other Topics Concern     Parent/sibling w/ CABG, MI or angioplasty before 65F 55M? Not Asked   Social History Narrative     Not on file     Social Determinants of Health     Financial Resource Strain: Not on file   Food Insecurity: Not on file   Transportation Needs: Not on file   Physical Activity: Not on file   Stress: Not on file   Social Connections: Not on file   Intimate Partner Violence: Not on file   Housing Stability: Not on file         FAMILY HISTORY:  Family History   Problem Relation Age of Onset     Cancer Mother 40        Uterine, diagnosed late \"40s\",  at age 83     Respiratory Mother         Emphysema     Cancer Father 50        prostate with bone mets,  age 91     Cancer Brother 80        prostate, radiation therapy, living     Cancer Maternal Grandmother         Cancer of the gallbladder     Heart Disease Maternal Grandfather      Cancer Paternal Grandmother         Breast, age unknown     Heart Disease Paternal Grandfather      Breast Cancer Paternal Aunt      Patient denies history of blood clots for herself, states that her family history of blood clots is related to her sister who was admitted for a hysterectomy followed by complications of peritonitis and \"blood clots throughout her whole body\" which was eventually fatal.      PHYSICAL EXAM:  Vital signs:  There were no vitals taken for this visit.   ECO  GENERAL/CONSTITUTIONAL: No acute distress. Healthy, alert.  EYES: No scleral icterus.  No redness or discharge.    RESPIRATORY: No audible wheeze or cough. No visible cyanosis.  No visible retractions or increased work of breathing.  Able to speak in complete sentences without distress.  MUSCULOSKELETAL: Normal range of motion of " visible extremities.  NEUROLOGIC: Alert, oriented to person, place and time, answers questions appropriately. No tremor. Mentation intact and speech normal  INTEGUMENTARY: No jaundice.  No obvious rash or skin lesions.  PSYCHIATRIC:  Mentation appears normal, affect normal, judgement and insight intact, normal speech and appearance, well-groomed.    The rest of a comprehensive physical exam is deferred due to video visit restrictions.      LABS:  CBC RESULTS: No results for input(s): WBC, RBC, HGB, HCT, MCV, MCH, MCHC, RDW, PLT in the last 95928 hours.    Recent Labs   Lab Test 10/13/22  1157 11/14/17  0906 02/25/15  1158 10/21/14  0822     --   --  141   POTASSIUM 4.0  --   --  3.9   CHLORIDE 110*  --   --  107   CO2 30  --   --  27   ANIONGAP 2*  --   --  7   *  --   --  140*   BUN 15  --   --  16   CR 0.70  --   --  0.68   JAZMYNE 8.7 8.7   < > 9.1    < > = values in this interval not displayed.         PATHOLOGY:      IMAGING:      ASSESSMENT/PLAN:  Leida Goddard is a 82 year old female with:      #RIGHT invasive ductal carcinoma, pT1c Nx  - ER positive (%), ID positive (80-90%), HER2 0 on IHC  - 9/2022 right breast lumpectomy at [facility]  - Pathology consultation pending  -10/22 radiation oncology consultation with Dr. Michelle: Based on CALGB 9343 ([place], [facility]OO 2013) trial, will omit radiation therapy  -11/22 Oncotype DX breast recurrence score: 18  Given patient is clinically node-negative based on ultrasound, pathologically Nx, the following information available:  A) node-negative: Distant recurrence risk at 9 years with endocrine therapy alone 5%, group average absolute chemotherapy benefit in patients over 50 years old less than 1%  B) micrometastases and node positive (1-3): Distant recurrence at 9 years with endocrine therapy alone 16%, chemotherapy benefit for this group cannot be excluded (SEER registry outcomes in patients treated without chemotherapy with recurrence score 16-20=  96.7%)  C) node positive (>/= 4), distant recurrence at 9 years with endocrine therapy alone: 50%  - The above Oncotype results were discussed, including the fact that we have no suspicious lymph nodes clinically based on ultrasound imaging and physical exam but no pathological proof of node-negative disease.  Patient is aware that there may be a possibility of at least micrometastatic disease within the lymph nodes. Risks, benefits of chemotherapy discussed. Given the above information, patient does not wish to proceed with any form of chemotherapy.  - We will proceed with adjuvant endocrine therapy for at least 5 years at this time and do breast cancer index testing prior to discontinuing.  - Pt has significant osteoporosis therefore will start tamoxifen (of note pt has had hysterectomy), s/s of VTE discussed, pt will have annual eye exam  - 10/22 CMP LFTs normal  - pt is currently on escitalopram, pt cannot be on selective serotonin reuptake inhibitor and tamoxifen at the same time because selective serotonin reuptake inhibitor can decrease the formation of active metabolites of tamoxifen and impact its efficacy. SNRIs appear to have minimal impact on tamoxifen metabolism and would be a good option.  - pt will contact her PCP Dr. Samantha Munguia to be switched to different antidepressant, pt instructed to call me if she is unable to reach Dr. Munguia or if any issues at all  - rx for tamoxifen sent to pts preferred pharmacy with 6 refills but pt knows not to start until she is off escitalopram/on non-selective serotonin reuptake inhibitor antidepressant   - next mammogram due 7/2023    #LEFT breast cancer multifocal microscopic invasive ductal  carcinoma, pT1a (M) NX (2012)  -ER positive, NM positive, HER2 negative on FISH   -s/p lumpectomy, RT, anastrazole 1mg from at least 1/8/14 to 1/19/18  -Patient has had bilateral breast cancer, recommend genetic counseling as this may be relevant for patient's family (1  daughter Fatimah, adopted; 1 sister passed; pt has several nieces and grand daughters she is concerned about)     #Osteoporosis  - osteoporosis on 7/22 DEXA, next DEXA due 7/2024  - diagnosis 9/2017, Fosamax held due to myalgias, thinks she got IV medication for osteoporosis in 7/22 at [facility]   - Patient was given Zometa 5 mg on 8/8/2022 with Dr. Samantha Grissom, can continue treatment w/PCP  - 10/22 CMP Creatinine 0.70   - Pt has upper and lower dentures, no native teeth, no upcoming dental work      RTC 6 months for f/u with me with labs before visit       Tracy Rangel DO  Hematology/Oncology  [facility]    Future Appointments   Date Time Provider Department Center   11/14/2022 10:15 AM [facility]

## 2022-11-16 RX ORDER — DULOXETIN HYDROCHLORIDE 30 MG/1
30 CAPSULE, DELAYED RELEASE ORAL DAILY
COMMUNITY
Start: 2022-11-14 | End: 2023-03-09

## 2023-03-09 ENCOUNTER — ONCOLOGY VISIT (OUTPATIENT)
Dept: ONCOLOGY | Facility: CLINIC | Age: 83
End: 2023-03-09
Payer: COMMERCIAL

## 2023-03-09 VITALS
HEART RATE: 63 BPM | RESPIRATION RATE: 20 BRPM | DIASTOLIC BLOOD PRESSURE: 76 MMHG | WEIGHT: 248 LBS | OXYGEN SATURATION: 99 % | SYSTOLIC BLOOD PRESSURE: 165 MMHG | BODY MASS INDEX: 40.03 KG/M2

## 2023-03-09 DIAGNOSIS — C50.911 INVASIVE DUCTAL CARCINOMA OF BREAST, FEMALE, RIGHT (H): Primary | ICD-10-CM

## 2023-03-09 PROCEDURE — 99213 OFFICE O/P EST LOW 20 MIN: CPT | Performed by: INTERNAL MEDICINE

## 2023-03-09 RX ORDER — MIRTAZAPINE 7.5 MG/1
7.5 TABLET, FILM COATED ORAL AT BEDTIME
COMMUNITY

## 2023-03-09 RX ORDER — ESCITALOPRAM OXALATE 10 MG/1
10 TABLET ORAL DAILY
Qty: 90 TABLET | Refills: 3 | Status: SHIPPED | OUTPATIENT
Start: 2023-03-09 | End: 2024-03-06

## 2023-03-09 ASSESSMENT — PAIN SCALES - GENERAL: PAINLEVEL: NO PAIN (0)

## 2023-03-09 NOTE — NURSING NOTE
"Oncology Rooming Note    March 9, 2023 2:23 PM   Leida Goddard is a 82 year old female who presents for:    Chief Complaint   Patient presents with     Oncology Clinic Visit     Follow up     Initial Vitals: BP (!) 165/76 (BP Location: Left arm)   Pulse 63   Resp 20   Wt 112.5 kg (248 lb)   SpO2 99%   BMI 40.03 kg/m   Estimated body mass index is 40.03 kg/m  as calculated from the following:    Height as of 4/13/21: 1.676 m (5' 6\").    Weight as of this encounter: 112.5 kg (248 lb). Body surface area is 2.29 meters squared.  No Pain (0) Comment: Data Unavailable   No LMP recorded. Patient has had a hysterectomy.  Allergies reviewed: Yes  Medications reviewed: Yes    Medications: Medication refills not needed today.  Pharmacy name entered into Int[facility]: [id number] - [place], [place] - [address] [address] [facility] [address] [facility]    Clinical concerns: Discuss change of RX. Patient feels that depression rx Miratzapine is not effective and wants to change back to Escitalopram and notes that she cannot take this with Tamoxifen.        Celsa Rangel LPN            "

## 2023-03-09 NOTE — LETTER
3/9/2023         RE: Leida Goddard  [address] [address] [place] [address] [address]  [facility] [id number]        Dear Colleague,    Thank you for referring your patient, Leida Goddard, to the [facility]. Please see a copy of my visit note below.    [facility]    Hematology/Oncology Established Patient Follow-up Note      Today's Date: 3/9/2023    Reason for Follow-up: RIGHT invasive ductal carcinoma, pT1c Nx      HISTORY OF PRESENT ILLNESS: Leida Goddard is a 82 year old female who presents for follow up regarding RIGHT invasive ductal carcinoma, pT1c Nx. She is accompanied by her grand daughter.     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  Patient is s/p hysterectomy and BSO.     Patient's previous work-up was done at [facility].  I have reviewed the records in care everywhere and they are summarized below.     Regarding LEFT breast cancer 2012:   -2012-screening mammogram showed grouped calcifications in the left breast at 7:00 posterior depth, increased in number, confirmed on ultrasound  - 2012-stereotactic biopsy with clip placement, pathology showed LCIS with fibroadenomatoid changes with microcalcifications  - 2013- left breast lumpectomy with pathology showin microscopic foci of invasive lobular carcinoma, largest focus 0.11 cm others 0.1 cm, 0.08 cm, 0.05 cm, overall grade 2, margins negative, multiple scattered foci of  LCIS within the specimen, no DCIS, no LVI, no lymph nodes submitted.  %, SC 80%, HER2 2+ on IHC, FISH negative.  Pathological staging AJCC seventh edition pT1a (M) NX  - I cannot find specific detailed records of this however notes state that patient received postlumpectomy radiation  -And anastrozole 1 mg daily, pt reports she took anastroazole for 5 yerars  -Patient followed with medical oncologist Dr. Heydi Queen     Regarding recently diagnosed RIGHT breast cancer:  -6/22- pt felt right breast shooting pains radiating to nipple, exacerbated by crocheting     -7/22 bilateral screening mammogram- heterogeneously dense breast, postsurgical changes in the left breast, scattered calcifications in right breast.  1.5 cm asymmetry, in line with the nipple, deep within the right breast, needs further evaluation     - 8/22- right mammogram: Spiculated mass measuring 18 mm in greatest dimension with surrounding architectural distortion, at 9 o'clock position posterior depth, scattered benign calcifications     -8/22- right breast ultrasound: Spiculated hypoechoic mass at the 9 o'clock position posterior depth measuring 1.5 cm in greatest dimension, right axilla no abnormal lymph nodes, BI-RADS 5     - 8/22- ultrasound-guided biopsy of right breast lesion with clip placement- 5x14 gauge core biopsies obtained  --- Pathology is not available at this time for review     -9/16/2022-right breast lumpectomy with second excision for involved margins with pathology showing:  A.  Right breast tissue, radioactive seed guided lumpectomy-Invasive ductal carcinoma, involving inferior and medial margins.  B.  Right breast inferior medial margin, excision-Invasive ductal carcinoma. New inferior and medial margins are free.  ---Invasive ductal carcinoma, grade 3, 1.7 cm, no LVI, dermal lymphovascular invasion N/A, all margins negative for invasive carcinoma (0.55 cm to new inferior margin on specimen B), regional lymph node  status N/A (no lymph nodes submitted or found)  ---ER 90 to 100%  --- NM 80 to 90%  --- HER2 zero on IHC  --- Pathologic stage classification (AJCC eighth edition) pT1c NX     Microscopic Description      A.  Sections demonstrate breast parenchyma with tumor mass composed of sheets and cords of markedly pleomorphic cells with scattered mitotic figures, including locally increased mitotic activity (T3 N3 M2).  The inferior and medial margins are involved by tumor.  There is prominent perineural invasion.  B.  Sections demonstrate breast parenchyma with invasive ductal carcinoma similar to that in specimen A.  The closest margin is inferior and measures 0.55 cm.     -11/22 Oncotype DX breast recurrence score: 18  Given patient is clinically node-negative based on ultrasound, pathologically Nx, the following information available:  A) node-negative: Distant recurrence risk at 9 years with endocrine therapy alone 5%, group average absolute chemotherapy benefit in patients over 50 years old less than 1%  B) micrometastases and node positive (1-3): Distant recurrence at 9 years with endocrine therapy alone 16%, chemotherapy benefit for this group cannot be excluded (see registry outcomes in patients treated without chemotherapy with recurrence score 16-20= 96.7%, versus recurrence score 0-10= 98.2%)  C) node positive (>/= 4), distant recurrence at 9 years with endocrine therapy alone: 50%    -10/22 radiation oncology consultation with Dr. Michelle: Based on CALGB 9343 ([place], LEOO 2013) trial, will omit radiation therapy      INTERIM HISTORY:  Patient asked to be seen today to discuss some concerns related to medications.    Pt reports starting tamoxifen 12/22  Pt was weaned off escitalopram and started on venlafaxine, which she she states caused her to be tired, have frequent crying, feeling confused.  She was on venlafaxine for about 2 weeks and was then switched to mirtazapine at bedtime which she states makes her feel  sleepy/hung over, fatigued, unmotivated, no interest in usual activities like knitting and crocheting, frequent crying  Pt has cymbalta on medication list but states she is not taking this    Pts BP today 165/76.       REVIEW OF SYSTEMS:   A 14 point ROS was reviewed with pertinent positives and negatives in the HPI.       HOME MEDICATIONS:  Current Outpatient Medications   Medication Sig Dispense Refill     calcium citrate-vitamin D (CITRACAL) 315-200 MG-UNIT TABS per tablet Take 1 tablet by mouth daily       escitalopram (LEXAPRO) 10 MG tablet Take 1 tablet (10 mg) by mouth daily 90 tablet 3     furosemide (LASIX) 40 MG tablet Take 20 mg by mouth daily        LEVOTHYROXINE SODIUM 25 MCG OR TABS 1 TABLET DAILY       losartan (COZAAR) 100 MG tablet Take 100 mg by mouth daily.       magnesium 250 MG tablet Take 1 tablet by mouth 2 times daily       mirtazapine (REMERON) 7.5 MG tablet Take 7.5 mg by mouth At Bedtime       omeprazole (PRILOSEC) 20 MG DR capsule Take 20 mg by mouth daily       potassium chloride ER (K-DUR/KLOR-CON M) 20 MEQ CR tablet Take 20 mEq by mouth 2 times daily       potassium chloride SA (K-DUR,KLOR-CON M) 20 MEQ tablet Take by mouth daily       PROPRANOLOL HCL PO Take 40 mg by mouth 2 times daily.       tamoxifen (NOLVADEX) 20 MG tablet Take 1 tablet (20 mg) by mouth daily 30 tablet 6     vitamin D2 (ERGOCALCIFEROL) 85267 units (1250 mcg) capsule Take 50,000 Units by mouth once a week       albuterol (PROAIR HFA/PROVENTIL HFA/VENTOLIN HFA) 108 (90 Base) MCG/ACT inhaler Inhale 2 puffs into the lungs (Patient not taking: Reported on 3/9/2023)       fluticasone (FLONASE) 50 MCG/ACT nasal spray Spray 1 spray into both nostrils 2 times daily (Patient not taking: Reported on 3/9/2023)       triamcinolone (KENALOG) 0.1 % external cream Apply twice daily as needed to the back, up to two weeks on at one time. (Patient not taking: Reported on 3/9/2023) 80 g 11         ALLERGIES:  Allergies   Allergen  Reactions     Metformin Nausea and Vomiting     Adhesive Tape Hives     Augmentin Diarrhea     Doxycycline Nausea and Vomiting     Food      Lactose Intolerant     Hctz Rash     Lisinopril Cough         PAST MEDICAL HISTORY:  Past Medical History:   Diagnosis Date     Breast cancer (H) 12/07/2012    Left breast     Breast cancer (H) 08/11/2022    Right Breast     Essential hypertension, benign     Hypertension, Benign     DALIA (obstructive sleep apnea)      S/P radiation therapy     6,040 cGy to left breast completed on 4/9/2013 [facility]     Type II or unspecified type diabetes mellitus without mention of complication, not stated as uncontrolled     Diabetes mellitus   LEFT pT1a (M) NX multifocal microscopic invasive lobular carcinoma, ER positive, GA positive, HER2 negative on FISH s/p lumpectomy, RT, anastrazole (2012), osteoporosis on 7/22 DEXA (diagnosis 9/2017, Fosamax held due to myalgias, ?IV bisphophonate therapy at [facility]), 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      PAST SURGICAL HISTORY:  Past Surgical History:   Procedure Laterality Date     BREAST BIOPSY, CORE RT/LT Right 08/11/2022     CHOLECYSTECTOMY  01/01/1966     HYSTERECTOMY TOTAL ABDOMINAL, BILATERAL SALPINGO-OOPHORECTOMY, COMBINED      age 43     LUMPECTOMY BREAST  01/07/2013    Left breast     LUMPECTOMY BREAST Right 09/16/2022    Dr. Joseph     REPAIR ENTROPION Left 04/19/2021    Procedure: REPAIR, ENTROPION;  Surgeon: Susy Marie MD;  Location: [facility] OR     SPINE SURGERY      Lumbar laminectomy     US BIOPSY PAROTID FINE NEEDLE ASPIRATION Right 02/08/2022    [place] Tumor     ZZC ANESTH,BLEPHAROPLASTY       Right total hip arthroplasty  Left total knee  "arthroplasty    SOCIAL HISTORY:  Social History     Socioeconomic History     Marital status:      Spouse name: Not on file     Number of children: Not on file     Years of education: Not on file     Highest education level: Not on file   Occupational History     Not on file   Tobacco Use     Smoking status: Never     Smokeless tobacco: Never   Substance and Sexual Activity     Alcohol use: No     Drug use: No     Sexual activity: Not Currently     Partners: Male   Other Topics Concern     Parent/sibling w/ CABG, MI or angioplasty before 65F 55M? Not Asked   Social History Narrative     Not on file     Social Determinants of Health     Financial Resource Strain: Not on file   Food Insecurity: Not on file   Transportation Needs: Not on file   Physical Activity: Not on file   Stress: Not on file   Social Connections: Not on file   Intimate Partner Violence: Not on file   Housing Stability: Not on file         FAMILY HISTORY:  Family History   Problem Relation Age of Onset     Cancer Mother 40        Uterine, diagnosed late \"40s\",  at age 83     Respiratory Mother         Emphysema     Cancer Father 50        prostate with bone mets,  age 91     Cancer Brother 80        prostate, radiation therapy, living     Cancer Maternal Grandmother         Cancer of the gallbladder     Heart Disease Maternal Grandfather      Cancer Paternal Grandmother         Breast, age unknown     Heart Disease Paternal Grandfather      Breast Cancer Paternal Aunt      Patient denies history of blood clots for herself, states that her family history of blood clots is related to her sister who was admitted for a hysterectomy followed by complications of peritonitis and \"blood clots throughout her whole body\" which was eventually fatal.      PHYSICAL EXAM:  Vital signs:  BP (!) 165/76 (BP Location: Left arm)   Pulse 63   Resp 20   Wt 112.5 kg (248 lb)   SpO2 99%   BMI 40.03 kg/m     ECO  GENERAL/CONSTITUTIONAL: " tearful,  Healthy, alert.  EYES: No scleral icterus.  No redness or discharge.    RESPIRATORY: No audible wheeze or cough. No visible cyanosis.  No visible retractions or increased work of breathing.  Able to speak in complete sentences without distress.  MUSCULOSKELETAL: Normal range of motion of visible extremities.  NEUROLOGIC: Alert, oriented to person, place and time, answers questions appropriately. No tremor. Mentation intact and speech normal  INTEGUMENTARY: No jaundice.  No obvious rash or skin lesions.  PSYCHIATRIC:  Mentation appears normal, affect normal, judgement and insight intact, normal speech and appearance, well-groomed.      LABS:      PATHOLOGY:      IMAGING:      ASSESSMENT/PLAN:  Leida Goddard is a 82 year old female with:      #RIGHT invasive ductal carcinoma, pT1c Nx  - ER positive (%), [place] positive (80-90%), HER2 0 on IHC  - 9/2022 right breast lumpectomy at [facility]  - Pathology consultation pending  -10/22 radiation oncology consultation with Dr. Michelle: Based on CALGB 9343 ([employer] [place] 2013) trial, will omit radiation therapy  -11/22 Oncotype DX breast recurrence score: 18  Given patient is clinically node-negative based on ultrasound, pathologically Nx, the following information available:  A) node-negative: Distant recurrence risk at 9 years with endocrine therapy alone 5%, group average absolute chemotherapy benefit in patients over 50 years old less than 1%  B) micrometastases and node positive (1-3): Distant recurrence at 9 years with endocrine therapy alone 16%, chemotherapy benefit for this group cannot be excluded (SEER registry outcomes in patients treated without chemotherapy with recurrence score 16-20= 96.7%)  C) node positive (>/= 4), distant recurrence at 9 years with endocrine therapy alone: 50%  - The above Oncotype results were discussed, including the fact that we have no suspicious lymph nodes clinically based on ultrasound imaging and physical exam but  no pathological proof of node-negative disease.  Patient is aware that there may be a possibility of at least micrometastatic disease within the lymph nodes. Risks, benefits of chemotherapy discussed. Given the above information, patient does not wish to proceed with any form of chemotherapy.    - pt started tamoxifen 12/22, tolerating well, saw eye doctor in 1/23  - pt was previously weaned off of lexpro   - pt has poorly tolerated being on venlafaxine and mirtazapine as described above, it is greatly affecting her quality of life  - options to continue tamoxifen, wean off of mirtazapine (as PCP directs) and then restart escitalopram with close monitoring VERSUS AI with continued zometa and close follow up for osteoporosis discussed  - will proceed with continuing on tamoxifen, wean off of mirtazapine (as PCP directs) and then restart escitalopram. I have rxed pts escitalopram and pt knows not to start this until she is off mirtazapine     - next right breast mammogram due 7/2023, to be ordered at next visit    #LEFT breast cancer multifocal microscopic invasive ductal  carcinoma, pT1a (M) NX (2012)  -ER positive, KS positive, HER2 negative on FISH   -s/p lumpectomy, RT, anastrazole 1mg from at least 1/8/14 to 1/19/18  -Patient has had bilateral breast cancer, referral for genetic counseling, pending appt (as this may be relevant for patient's family (1 daughter Fatimah, adopted; 1 sister passed; pt has several nieces and grand daughters she is concerned about))     #Osteoporosis  - osteoporosis on 7/22 DEXA, next DEXA due 7/2024  - diagnosis 9/2017, Fosamax held due to myalgias, thinks she got IV medication for osteoporosis in 7/22 at [facility]   - Patient was given Zometa 5 mg on 8/8/2022 with Dr. Samantha Grissom and plan for annual treatment, can continue treatment w/PCP  - continue calcium and vitamin D  - Pt has upper and lower dentures, no native teeth, no upcoming dental work      RTC 3 months for f/u with me  with labs before visit       Rhonda Germain DO  Hematology/Oncology  [facility]    Future Appointments   Date Time Provider Department Center   11/14/2022 10:15 AM Rhonda Germain DO [facility]          Again, thank you for allowing me to participate in the care of your patient.        Sincerely,        RHONDA GERMAIN DO

## 2023-03-09 NOTE — PROGRESS NOTES
[facility]    Hematology/Oncology Established Patient Follow-up Note      Today's Date: 3/9/2023    Reason for Follow-up: RIGHT invasive ductal carcinoma, pT1c Nx      HISTORY OF PRESENT ILLNESS: Leida Goddard is a 82 year old female who presents for follow up regarding RIGHT invasive ductal carcinoma, pT1c Nx. She is accompanied by her grand daughter.     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  Patient is s/p hysterectomy and BSO.     Patient's previous work-up was done at [facility].  I have reviewed the records in care everywhere and they are summarized below.     Regarding LEFT breast cancer 2012:   -2012-screening mammogram showed grouped calcifications in the left breast at 7:00 posterior depth, increased in number, confirmed on ultrasound  - 2012-stereotactic biopsy with clip placement, pathology showed LCIS with fibroadenomatoid changes with microcalcifications  - 2013- left breast lumpectomy with pathology showin microscopic foci of invasive lobular carcinoma, largest focus 0.11 cm others 0.1 cm, 0.08 cm, 0.05 cm, overall grade 2, margins negative, multiple scattered foci of LCIS within the specimen, no DCIS, no LVI, no lymph nodes submitted.  %, MT 80%, HER2 2+ on IHC, FISH negative.  Pathological staging AJCC seventh edition pT1a (M) NX  - I cannot find specific detailed records of this however notes state that patient received postlumpectomy  radiation  -And anastrozole 1 mg daily, pt reports she took anastroazole for 5 yerars  -Patient followed with medical oncologist Dr. Heydi Queen     Regarding recently diagnosed RIGHT breast cancer:  -6/22- pt felt right breast shooting pains radiating to nipple, exacerbated by crocheting     -7/22 bilateral screening mammogram- heterogeneously dense breast, postsurgical changes in the left breast, scattered calcifications in right breast.  1.5 cm asymmetry, in line with the nipple, deep within the right breast, needs further evaluation     - 8/22- right mammogram: Spiculated mass measuring 18 mm in greatest dimension with surrounding architectural distortion, at 9 o'clock position posterior depth, scattered benign calcifications     -8/22- right breast ultrasound: Spiculated hypoechoic mass at the 9 o'clock position posterior depth measuring 1.5 cm in greatest dimension, right axilla no abnormal lymph nodes, BI-RADS 5     - 8/22- ultrasound-guided biopsy of right breast lesion with clip placement- 5x14 gauge core biopsies obtained  --- Pathology is not available at this time for review     -9/16/2022-right breast lumpectomy with second excision for involved margins with pathology showing:  A.  Right breast tissue, radioactive seed guided lumpectomy-Invasive ductal carcinoma, involving inferior and medial margins.  B.  Right breast inferior medial margin, excision-Invasive ductal carcinoma. New inferior and medial margins are free.  ---Invasive ductal carcinoma, grade 3, 1.7 cm, no LVI, dermal lymphovascular invasion N/A, all margins negative for invasive carcinoma (0.55 cm to new inferior margin on specimen B), regional lymph node status N/A (no lymph nodes submitted or found)  ---ER 90 to 100%  --- OR 80 to 90%  --- HER2 zero on IHC  --- Pathologic stage classification (AJCC eighth edition) pT1c NX     Microscopic Description      A.  Sections demonstrate breast parenchyma with tumor mass composed of sheets and  cords of markedly pleomorphic cells with scattered mitotic figures, including locally increased mitotic activity (T3 N3 M2).  The inferior and medial margins are involved by tumor.  There is prominent perineural invasion.  B.  Sections demonstrate breast parenchyma with invasive ductal carcinoma similar to that in specimen A.  The closest margin is inferior and measures 0.55 cm.     -11/22 Oncotype DX breast recurrence score: 18  Given patient is clinically node-negative based on ultrasound, pathologically Nx, the following information available:  A) node-negative: Distant recurrence risk at 9 years with endocrine therapy alone 5%, group average absolute chemotherapy benefit in patients over 50 years old less than 1%  B) micrometastases and node positive (1-3): Distant recurrence at 9 years with endocrine therapy alone 16%, chemotherapy benefit for this group cannot be excluded (see registry outcomes in patients treated without chemotherapy with recurrence score 16-20= 96.7%, versus recurrence score 0-10= 98.2%)  C) node positive (>/= 4), distant recurrence at 9 years with endocrine therapy alone: 50%    -10/22 radiation oncology consultation with Dr. Michelle: Based on CALGB 9343 ([place], LEOO 2013) trial, will omit radiation therapy      INTERIM HISTORY:  Patient asked to be seen today to discuss some concerns related to medications.    Pt reports starting tamoxifen 12/22  Pt was weaned off escitalopram and started on venlafaxine, which she she states caused her to be tired, have frequent crying, feeling confused.  She was on venlafaxine for about 2 weeks and was then switched to mirtazapine at bedtime which she states makes her feel sleepy/hung over, fatigued, unmotivated, no interest in usual activities like knitting and crocheting, frequent crying  Pt has cymbalta on medication list but states she is not taking this    Pts BP today 165/76.       REVIEW OF SYSTEMS:   A 14 point ROS was reviewed with pertinent  positives and negatives in the HPI.       HOME MEDICATIONS:  Current Outpatient Medications   Medication Sig Dispense Refill     calcium citrate-vitamin D (CITRACAL) 315-200 MG-UNIT TABS per tablet Take 1 tablet by mouth daily       escitalopram (LEXAPRO) 10 MG tablet Take 1 tablet (10 mg) by mouth daily 90 tablet 3     furosemide (LASIX) 40 MG tablet Take 20 mg by mouth daily        LEVOTHYROXINE SODIUM 25 MCG OR TABS 1 TABLET DAILY       losartan (COZAAR) 100 MG tablet Take 100 mg by mouth daily.       magnesium 250 MG tablet Take 1 tablet by mouth 2 times daily       mirtazapine (REMERON) 7.5 MG tablet Take 7.5 mg by mouth At Bedtime       omeprazole (PRILOSEC) 20 MG DR capsule Take 20 mg by mouth daily       potassium chloride ER (K-DUR/KLOR-CON M) 20 MEQ CR tablet Take 20 mEq by mouth 2 times daily       potassium chloride SA (K-DUR,KLOR-CON M) 20 MEQ tablet Take by mouth daily       PROPRANOLOL HCL PO Take 40 mg by mouth 2 times daily.       tamoxifen (NOLVADEX) 20 MG tablet Take 1 tablet (20 mg) by mouth daily 30 tablet 6     vitamin D2 (ERGOCALCIFEROL) 44260 units (1250 mcg) capsule Take 50,000 Units by mouth once a week       albuterol (PROAIR HFA/PROVENTIL HFA/VENTOLIN HFA) 108 (90 Base) MCG/ACT inhaler Inhale 2 puffs into the lungs (Patient not taking: Reported on 3/9/2023)       fluticasone (FLONASE) 50 MCG/ACT nasal spray Spray 1 spray into both nostrils 2 times daily (Patient not taking: Reported on 3/9/2023)       triamcinolone (KENALOG) 0.1 % external cream Apply twice daily as needed to the back, up to two weeks on at one time. (Patient not taking: Reported on 3/9/2023) 80 g 11         ALLERGIES:  Allergies   Allergen Reactions     Metformin Nausea and Vomiting     Adhesive Tape Hives     Augmentin Diarrhea     Doxycycline Nausea and Vomiting     Food      Lactose Intolerant     Hctz Rash     Lisinopril Cough         PAST MEDICAL HISTORY:  Past Medical History:   Diagnosis Date     Breast cancer (H)  12/07/2012    Left breast     Breast cancer (H) 08/11/2022    Right Breast     Essential hypertension, benign     Hypertension, Benign     DALIA (obstructive sleep apnea)      S/P radiation therapy     6,040 cGy to left breast completed on 4/9/2013 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      PAST SURGICAL HISTORY:  Past Surgical History:   Procedure Laterality Date     BREAST BIOPSY, CORE RT/LT Right 08/11/2022     CHOLECYSTECTOMY  01/01/1966     HYSTERECTOMY TOTAL ABDOMINAL, BILATERAL SALPINGO-OOPHORECTOMY, COMBINED      age 43     LUMPECTOMY BREAST  01/07/2013    Left breast     LUMPECTOMY BREAST Right 09/16/2022    Dr. Joseph     REPAIR ENTROPION Left 04/19/2021    Procedure: REPAIR, ENTROPION;  Surgeon: Susy Marie MD;  Location: [facility] OR     SPINE SURGERY      Lumbar laminectomy     US BIOPSY PAROTID FINE NEEDLE ASPIRATION Right 02/08/2022    [place] Tumor     ZZC ANESTH,BLEPHAROPLASTY       Right total hip arthroplasty  Left total knee arthroplasty    SOCIAL HISTORY:  Social History     Socioeconomic History     Marital status:      Spouse name: Not on file     Number of children: Not on file     Years of education: Not on file     Highest education level: Not on file   Occupational History     Not on file  "  Tobacco Use     Smoking status: Never     Smokeless tobacco: Never   Substance and Sexual Activity     Alcohol use: No     Drug use: No     Sexual activity: Not Currently     Partners: Male   Other Topics Concern     Parent/sibling w/ CABG, MI or angioplasty before 65F 55M? Not Asked   Social History Narrative     Not on file     Social Determinants of Health     Financial Resource Strain: Not on file   Food Insecurity: Not on file   Transportation Needs: Not on file   Physical Activity: Not on file   Stress: Not on file   Social Connections: Not on file   Intimate Partner Violence: Not on file   Housing Stability: Not on file         FAMILY HISTORY:  Family History   Problem Relation Age of Onset     Cancer Mother 40        Uterine, diagnosed late \"40s\",  at age 83     Respiratory Mother         Emphysema     Cancer Father 50        prostate with bone mets,  age 91     Cancer Brother 80        prostate, radiation therapy, living     Cancer Maternal Grandmother         Cancer of the gallbladder     Heart Disease Maternal Grandfather      Cancer Paternal Grandmother         Breast, age unknown     Heart Disease Paternal Grandfather      Breast Cancer Paternal Aunt      Patient denies history of blood clots for herself, states that her family history of blood clots is related to her sister who was admitted for a hysterectomy followed by complications of peritonitis and \"blood clots throughout her whole body\" which was eventually fatal.      PHYSICAL EXAM:  Vital signs:  BP (!) 165/76 (BP Location: Left arm)   Pulse 63   Resp 20   Wt 112.5 kg (248 lb)   SpO2 99%   BMI 40.03 kg/m     ECO  GENERAL/CONSTITUTIONAL: tearful,  Healthy, alert.  EYES: No scleral icterus.  No redness or discharge.    RESPIRATORY: No audible wheeze or cough. No visible cyanosis.  No visible retractions or increased work of breathing.  Able to speak in complete sentences without distress.  MUSCULOSKELETAL: Normal range " of motion of visible extremities.  NEUROLOGIC: Alert, oriented to person, place and time, answers questions appropriately. No tremor. Mentation intact and speech normal  INTEGUMENTARY: No jaundice.  No obvious rash or skin lesions.  PSYCHIATRIC:  Mentation appears normal, affect normal, judgement and insight intact, normal speech and appearance, well-groomed.      LABS:      PATHOLOGY:      IMAGING:      ASSESSMENT/PLAN:  Leida Goddard is a 82 year old female with:      #RIGHT invasive ductal carcinoma, pT1c Nx  - ER positive (%), AZ positive (80-90%), HER2 0 on IHC  - 9/2022 right breast lumpectomy at [facility]  - Pathology consultation pending  -10/22 radiation oncology consultation with Dr. Michelle: Based on CALGB 9343 ([facility] [facility] 2013) trial, will omit radiation therapy  -11/22 Oncotype DX breast recurrence score: 18  Given patient is clinically node-negative based on ultrasound, pathologically Nx, the following information available:  A) node-negative: Distant recurrence risk at 9 years with endocrine therapy alone 5%, group average absolute chemotherapy benefit in patients over 50 years old less than 1%  B) micrometastases and node positive (1-3): Distant recurrence at 9 years with endocrine therapy alone 16%, chemotherapy benefit for this group cannot be excluded (SEER registry outcomes in patients treated without chemotherapy with recurrence score 16-20= 96.7%)  C) node positive (>/= 4), distant recurrence at 9 years with endocrine therapy alone: 50%  - The above Oncotype results were discussed, including the fact that we have no suspicious lymph nodes clinically based on ultrasound imaging and physical exam but no pathological proof of node-negative disease.  Patient is aware that there may be a possibility of at least micrometastatic disease within the lymph nodes. Risks, benefits of chemotherapy discussed. Given the above information, patient does not wish to proceed with any form of  chemotherapy.    - pt started tamoxifen 12/22, tolerating well, saw eye doctor in 1/23  - pt was previously weaned off of lexpro   - pt has poorly tolerated being on venlafaxine and mirtazapine as described above, it is greatly affecting her quality of life  - options to continue tamoxifen, wean off of mirtazapine (as PCP directs) and then restart escitalopram with close monitoring VERSUS AI with continued zometa and close follow up for osteoporosis discussed  - will proceed with continuing on tamoxifen, wean off of mirtazapine (as PCP directs) and then restart escitalopram. I have rxed pts escitalopram and pt knows not to start this until she is off mirtazapine     - next right breast mammogram due 7/2023, to be ordered at next visit    #LEFT breast cancer multifocal microscopic invasive ductal  carcinoma, pT1a (M) NX (2012)  -ER positive, CO positive, HER2 negative on FISH   -s/p lumpectomy, RT, anastrazole 1mg from at least 1/8/14 to 1/19/18  -Patient has had bilateral breast cancer, referral for genetic counseling, pending appt (as this may be relevant for patient's family (1 daughter Fatimah, adopted; 1 sister passed; pt has several nieces and grand daughters she is concerned about))     #Osteoporosis  - osteoporosis on 7/22 DEXA, next DEXA due 7/2024  - diagnosis 9/2017, Fosamax held due to myalgias, thinks she got IV medication for osteoporosis in 7/22 at [facility]   - Patient was given Zometa 5 mg on 8/8/2022 with Dr. Samantha Grissom and plan for annual treatment, can continue treatment w/PCP  - continue calcium and vitamin D  - Pt has upper and lower dentures, no native teeth, no upcoming dental work      RTC 3 months for f/u with me with labs before visit     ADDENDUM: Called pts PCP Dr. Samantha Grissom at [phone] on 3/10/23 to discuss about medication change, she has already contacted Leida with instructions to wean mirtazapine and I rxed pt escitalopram yesterday. Pt called back and confirmed the  above as well.     Tracy Rangel DO  Hematology/Oncology  [facility]    Future Appointments   Date Time Provider Department Center   11/14/2022 10:15 AM Tracy Rangel DO [facility]

## 2023-05-02 ENCOUNTER — PATIENT OUTREACH (OUTPATIENT)
Dept: ONCOLOGY | Facility: CLINIC | Age: 83
End: 2023-05-02
Payer: COMMERCIAL

## 2023-05-02 NOTE — PROGRESS NOTES
[facility]: Cancer Care                                                                                          Called Leida to review her cancer treatment summary that was prepared and mailed to her. She states she did receive it and has reviewed the information. She states she is no longer taking any antidepressants and feels happy and wonderful. She asked that I pass that message onto Dr. Rangel which I did. She has no questions or survivorship needs at this time.     Shy Rush RN BSN  Cancer Survivorship Coordinator

## 2023-06-15 ENCOUNTER — PATIENT OUTREACH (OUTPATIENT)
Dept: ONCOLOGY | Facility: CLINIC | Age: 83
End: 2023-06-15

## 2023-06-15 ENCOUNTER — LAB (OUTPATIENT)
Dept: LAB | Facility: CLINIC | Age: 83
End: 2023-06-15
Attending: INTERNAL MEDICINE
Payer: COMMERCIAL

## 2023-06-15 ENCOUNTER — ONCOLOGY VISIT (OUTPATIENT)
Dept: ONCOLOGY | Facility: CLINIC | Age: 83
End: 2023-06-15
Attending: INTERNAL MEDICINE
Payer: COMMERCIAL

## 2023-06-15 VITALS
BODY MASS INDEX: 40.03 KG/M2 | TEMPERATURE: 98.2 F | WEIGHT: 248 LBS | RESPIRATION RATE: 20 BRPM | DIASTOLIC BLOOD PRESSURE: 72 MMHG | SYSTOLIC BLOOD PRESSURE: 148 MMHG | OXYGEN SATURATION: 99 % | HEART RATE: 62 BPM

## 2023-06-15 DIAGNOSIS — Z17.0 MALIGNANT NEOPLASM OF OVERLAPPING SITES OF RIGHT BREAST IN FEMALE, ESTROGEN RECEPTOR POSITIVE (H): ICD-10-CM

## 2023-06-15 DIAGNOSIS — C50.911 INVASIVE DUCTAL CARCINOMA OF BREAST, FEMALE, RIGHT (H): Primary | ICD-10-CM

## 2023-06-15 DIAGNOSIS — M81.0 AGE-RELATED OSTEOPOROSIS WITHOUT CURRENT PATHOLOGICAL FRACTURE: ICD-10-CM

## 2023-06-15 DIAGNOSIS — C50.811 MALIGNANT NEOPLASM OF OVERLAPPING SITES OF RIGHT BREAST IN FEMALE, ESTROGEN RECEPTOR POSITIVE (H): ICD-10-CM

## 2023-06-15 DIAGNOSIS — C50.911 INVASIVE DUCTAL CARCINOMA OF BREAST, FEMALE, RIGHT (H): ICD-10-CM

## 2023-06-15 LAB
ALBUMIN SERPL BCG-MCNC: 4.1 G/DL (ref 3.5–5.2)
ALP SERPL-CCNC: 45 U/L (ref 35–104)
ALT SERPL W P-5'-P-CCNC: 11 U/L (ref 0–50)
ANION GAP SERPL CALCULATED.3IONS-SCNC: 10 MMOL/L (ref 7–15)
AST SERPL W P-5'-P-CCNC: 24 U/L (ref 0–45)
BASOPHILS # BLD AUTO: 0.1 10E3/UL (ref 0–0.2)
BASOPHILS NFR BLD AUTO: 1 %
BILIRUB SERPL-MCNC: 0.3 MG/DL
BUN SERPL-MCNC: 13 MG/DL (ref 8–23)
CALCIUM SERPL-MCNC: 8.9 MG/DL (ref 8.8–10.2)
CHLORIDE SERPL-SCNC: 106 MMOL/L (ref 98–107)
CREAT SERPL-MCNC: 0.75 MG/DL (ref 0.51–0.95)
DEPRECATED HCO3 PLAS-SCNC: 26 MMOL/L (ref 22–29)
EOSINOPHIL # BLD AUTO: 0.3 10E3/UL (ref 0–0.7)
EOSINOPHIL NFR BLD AUTO: 3 %
ERYTHROCYTE [DISTWIDTH] IN BLOOD BY AUTOMATED COUNT: 12.7 % (ref 10–15)
GFR SERPL CREATININE-BSD FRML MDRD: 78 ML/MIN/1.73M2
GLUCOSE SERPL-MCNC: 158 MG/DL (ref 70–99)
HCT VFR BLD AUTO: 37.7 % (ref 35–47)
HGB BLD-MCNC: 13.2 G/DL (ref 11.7–15.7)
HOLD SPECIMEN: NORMAL
IMM GRANULOCYTES # BLD: 0 10E3/UL
IMM GRANULOCYTES NFR BLD: 0 %
LYMPHOCYTES # BLD AUTO: 2.5 10E3/UL (ref 0.8–5.3)
LYMPHOCYTES NFR BLD AUTO: 31 %
MCH RBC QN AUTO: 33.2 PG (ref 26.5–33)
MCHC RBC AUTO-ENTMCNC: 35 G/DL (ref 31.5–36.5)
MCV RBC AUTO: 95 FL (ref 78–100)
MONOCYTES # BLD AUTO: 0.8 10E3/UL (ref 0–1.3)
MONOCYTES NFR BLD AUTO: 10 %
NEUTROPHILS # BLD AUTO: 4.2 10E3/UL (ref 1.6–8.3)
NEUTROPHILS NFR BLD AUTO: 55 %
NRBC # BLD AUTO: 0 10E3/UL
NRBC BLD AUTO-RTO: 0 /100
PLATELET # BLD AUTO: 216 10E3/UL (ref 150–450)
POTASSIUM SERPL-SCNC: 4 MMOL/L (ref 3.4–5.3)
PROT SERPL-MCNC: 6.5 G/DL (ref 6.4–8.3)
RBC # BLD AUTO: 3.97 10E6/UL (ref 3.8–5.2)
SODIUM SERPL-SCNC: 142 MMOL/L (ref 136–145)
WBC # BLD AUTO: 7.8 10E3/UL (ref 4–11)

## 2023-06-15 PROCEDURE — 85025 COMPLETE CBC W/AUTO DIFF WBC: CPT

## 2023-06-15 PROCEDURE — 99214 OFFICE O/P EST MOD 30 MIN: CPT | Performed by: INTERNAL MEDICINE

## 2023-06-15 PROCEDURE — 36415 COLL VENOUS BLD VENIPUNCTURE: CPT

## 2023-06-15 PROCEDURE — 80053 COMPREHEN METABOLIC PANEL: CPT

## 2023-06-15 RX ORDER — HEPARIN SODIUM (PORCINE) LOCK FLUSH IV SOLN 100 UNIT/ML 100 UNIT/ML
5 SOLUTION INTRAVENOUS
Status: CANCELLED | OUTPATIENT
Start: 2024-07-19

## 2023-06-15 RX ORDER — HEPARIN SODIUM (PORCINE) LOCK FLUSH IV SOLN 100 UNIT/ML 100 UNIT/ML
5 SOLUTION INTRAVENOUS
Status: CANCELLED | OUTPATIENT
Start: 2023-07-14

## 2023-06-15 RX ORDER — ZOLEDRONIC ACID 0.04 MG/ML
4 INJECTION, SOLUTION INTRAVENOUS ONCE
Status: CANCELLED | OUTPATIENT
Start: 2023-07-14 | End: 2023-06-26

## 2023-06-15 RX ORDER — HEPARIN SODIUM,PORCINE 10 UNIT/ML
5-20 VIAL (ML) INTRAVENOUS DAILY PRN
Status: CANCELLED | OUTPATIENT
Start: 2024-01-18

## 2023-06-15 RX ORDER — HEPARIN SODIUM,PORCINE 10 UNIT/ML
5-20 VIAL (ML) INTRAVENOUS DAILY PRN
Status: CANCELLED | OUTPATIENT
Start: 2024-07-19

## 2023-06-15 RX ORDER — HEPARIN SODIUM (PORCINE) LOCK FLUSH IV SOLN 100 UNIT/ML 100 UNIT/ML
5 SOLUTION INTRAVENOUS
Status: CANCELLED | OUTPATIENT
Start: 2024-01-18

## 2023-06-15 RX ORDER — ZOLEDRONIC ACID 0.04 MG/ML
4 INJECTION, SOLUTION INTRAVENOUS ONCE
Status: CANCELLED | OUTPATIENT
Start: 2024-07-19 | End: 2024-06-20

## 2023-06-15 RX ORDER — HEPARIN SODIUM,PORCINE 10 UNIT/ML
5-20 VIAL (ML) INTRAVENOUS DAILY PRN
Status: CANCELLED | OUTPATIENT
Start: 2023-07-14

## 2023-06-15 RX ORDER — ZOLEDRONIC ACID 0.04 MG/ML
4 INJECTION, SOLUTION INTRAVENOUS ONCE
Status: CANCELLED | OUTPATIENT
Start: 2024-01-18 | End: 2023-12-23

## 2023-06-15 RX ORDER — LETROZOLE 2.5 MG/1
2.5 TABLET, FILM COATED ORAL DAILY
Qty: 90 TABLET | Refills: 3 | Status: SHIPPED | OUTPATIENT
Start: 2023-06-15 | End: 2024-01-18

## 2023-06-15 ASSESSMENT — PAIN SCALES - GENERAL: PAINLEVEL: NO PAIN (0)

## 2023-06-15 NOTE — LETTER
6/15/2023         RE: Leida Goddard  [id number] [address] [place] [address] [address]  [facility] [id number]        Dear Colleague,    Thank you for referring your patient, Leida Goddard, to the [facility]. Please see a copy of my visit note below.    [facility]    Hematology/Oncology Established Patient Follow-up Note      Today's Date: 6/15/2023    Reason for Follow-up: prior left breast cancer, now RIGHT invasive ductal carcinoma, pT1c Nx      HISTORY OF PRESENT ILLNESS: Leida Goddard is a 83 year old female who presents for follow up    Patient's medical history includes LEFT pT1a (M) NX multifocal microscopic invasive lobular carcinoma, ER positive, ME positive, HER2 negative on FISH s/p lumpectomy, RT, anastrazole (), osteoporosis on  DEXA (diagnosis 2017, on zometa at [facility]), 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  Patient is s/p hysterectomy and BSO.     Patient's previous work-up was done at [facility].  I have reviewed the records in care everywhere and they are summarized below.     Regarding LEFT breast cancer 2012:   -2012-screening mammogram showed grouped calcifications in the left breast at 7:00 posterior depth, increased in number, confirmed on ultrasound  - 2012-stereotactic biopsy with clip placement, pathology showed LCIS with fibroadenomatoid changes with microcalcifications  - 2013- left breast lumpectomy with pathology showin microscopic foci of invasive lobular carcinoma, largest focus 0.11 cm others 0.1 cm, 0.08 cm, 0.05 cm, overall grade 2, margins negative, multiple scattered foci of LCIS within the specimen, no DCIS, no LVI, no lymph nodes  submitted.  %, AR 80%, HER2 2+ on IHC, FISH negative.  Pathological staging AJCC seventh edition pT1a (M) NX  - I cannot find specific detailed records of this however notes state that patient received postlumpectomy radiation  -And anastrozole 1 mg daily, pt reports she took anastroazole for 5 yerars  -Patient followed with medical oncologist Dr. Heydi Queen     Regarding recently diagnosed RIGHT breast cancer:  -6/22- pt felt right breast shooting pains radiating to nipple, exacerbated by crocheting     -7/22 bilateral screening mammogram- heterogeneously dense breast, postsurgical changes in the left breast, scattered calcifications in right breast.  1.5 cm asymmetry, in line with the nipple, deep within the right breast, needs further evaluation     - 8/22- right mammogram: Spiculated mass measuring 18 mm in greatest dimension with surrounding architectural distortion, at 9 o'clock position posterior depth, scattered benign calcifications     -8/22- right breast ultrasound: Spiculated hypoechoic mass at the 9 o'clock position posterior depth measuring 1.5 cm in greatest dimension, right axilla no abnormal lymph nodes, BI-RADS 5     - 8/22- ultrasound-guided biopsy of right breast lesion with clip placement- 5x14 gauge core biopsies obtained  --- Pathology is not available at this time for review     -9/16/2022-right breast lumpectomy with second excision for involved margins with pathology showing:  A.  Right breast tissue, radioactive seed guided lumpectomy-Invasive ductal carcinoma, involving inferior and medial margins.  B.  Right breast inferior medial margin, excision-Invasive ductal carcinoma. New inferior and medial margins are free.  ---Invasive ductal carcinoma, grade 3, 1.7 cm, no LVI, dermal lymphovascular invasion N/A, all margins negative for invasive carcinoma (0.55 cm to new inferior margin on specimen B), regional lymph node status N/A (no lymph nodes submitted or found)  ---ER 90 to  100%  --- [place] 80 to 90%  --- HER2 zero on IHC  --- Pathologic stage classification (AJCC eighth edition) pT1c NX     Microscopic Description      A.  Sections demonstrate breast parenchyma with tumor mass composed of sheets and cords of markedly pleomorphic cells with scattered mitotic figures, including locally increased mitotic activity (T3 N3 M2).  The inferior and medial margins are involved by tumor.  There is prominent perineural invasion.  B.  Sections demonstrate breast parenchyma with invasive ductal carcinoma similar to that in specimen A.  The closest margin is inferior and measures 0.55 cm.     -11/22 Oncotype DX breast recurrence score: 18  Given patient is clinically node-negative based on ultrasound, pathologically Nx, the following information available:  A) node-negative: Distant recurrence risk at 9 years with endocrine therapy alone 5%, group average absolute chemotherapy benefit in patients over 50 years old less than 1%  B) micrometastases and node positive (1-3): Distant recurrence at 9 years with endocrine therapy alone 16%, chemotherapy benefit for this group cannot be excluded (see registry outcomes in patients treated without chemotherapy with recurrence score 16-20= 96.7%, versus recurrence score 0-10= 98.2%)  C) node positive (>/= 4), distant recurrence at 9 years with endocrine therapy alone: 50%    -10/22 radiation oncology consultation with Dr. Michelle: Based on CALGB 9343 ([place], [facility] 2013) trial, will omit radiation therapy    - 12/22 started tamoxifen     INTERIM HISTORY:  Pt continues on tamoxifen but feels this is medication is not working well for her. She feels her personality has changed, fatigued, very tired, not doing things she found andrade in like crocheting, has noted increased BP requiring cozaar adjustment. Overall, she reports her quality of life is poor on tamoxifen and doesn't want to take this any more.     5/23 stopped taking all antidepressants, continued to have  depressed mood, restarted on lexapro 5mg about 2 weeks ago.      REVIEW OF SYSTEMS:   A 14 point ROS was reviewed with pertinent positives and negatives in the HPI.       HOME MEDICATIONS:  Current Outpatient Medications   Medication Sig Dispense Refill     cholecalciferol (VITAMIN D3) 10 mcg (400 units) TABS tablet Take 10 mcg by mouth daily       escitalopram (LEXAPRO) 10 MG tablet Take 1 tablet (10 mg) by mouth daily (Patient taking differently: Take 5 mg by mouth daily) 90 tablet 3     fluticasone (FLONASE) 50 MCG/ACT nasal spray Spray 1 spray into both nostrils 2 times daily       furosemide (LASIX) 40 MG tablet Take 20 mg by mouth daily        LEVOTHYROXINE SODIUM 25 MCG OR TABS 1 TABLET DAILY       losartan (COZAAR) 100 MG tablet Take 100 mg by mouth daily.       magnesium 250 MG tablet Take 1 tablet by mouth 2 times daily       potassium chloride ER (K-DUR/KLOR-CON M) 20 MEQ CR tablet Take 20 mEq by mouth 2 times daily       PROPRANOLOL HCL PO Take 40 mg by mouth 2 times daily.       tamoxifen (NOLVADEX) 20 MG tablet Take 1 tablet (20 mg) by mouth daily 30 tablet 6     albuterol (PROAIR HFA/PROVENTIL HFA/VENTOLIN HFA) 108 (90 Base) MCG/ACT inhaler Inhale 2 puffs into the lungs (Patient not taking: Reported on 3/9/2023)       calcium citrate-vitamin D (CITRACAL) 315-200 MG-UNIT TABS per tablet Take 1 tablet by mouth daily (Patient not taking: Reported on 6/15/2023)       mirtazapine (REMERON) 7.5 MG tablet Take 7.5 mg by mouth At Bedtime (Patient not taking: Reported on 6/15/2023)       omeprazole (PRILOSEC) 20 MG DR capsule Take 20 mg by mouth daily (Patient not taking: Reported on 6/15/2023)       potassium chloride SA (K-DUR,KLOR-CON M) 20 MEQ tablet Take by mouth daily (Patient not taking: Reported on 6/15/2023)       triamcinolone (KENALOG) 0.1 % external cream Apply twice daily as needed to the back, up to two weeks on at one time. (Patient not taking: Reported on 3/9/2023) 80 g 11     vitamin D2  (ERGOCALCIFEROL) 86382 units (1250 mcg) capsule Take 50,000 Units by mouth once a week (Patient not taking: Reported on 6/15/2023)           ALLERGIES:  Allergies   Allergen Reactions     Metformin Nausea and Vomiting     Adhesive Tape Hives     Amoxicillin-Pot Clavulanate Diarrhea     Doxycycline Nausea and Vomiting     Food      Lactose Intolerant     Hctz Rash     Lisinopril Cough         PAST MEDICAL HISTORY:  Past Medical History:   Diagnosis Date     Breast cancer (H) 12/07/2012    Left breast     Breast cancer (H) 08/11/2022    Right Breast     Essential hypertension, benign     Hypertension, Benign     DALIA (obstructive sleep apnea)      S/P radiation therapy     6,040 cGy to left breast completed on 4/9/2013 [facility]     Type II or unspecified type diabetes mellitus without mention of complication, not stated as uncontrolled     Diabetes mellitus   LEFT pT1a (M) NX multifocal microscopic invasive lobular carcinoma, ER positive, NH positive, HER2 negative on FISH s/p lumpectomy, RT, anastrazole (2012), osteoporosis on 7/22 DEXA (diagnosis 9/2017, Fosamax held due to myalgias, ?[facility]phoph[facility] at [facility]), 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      PAST SURGICAL HISTORY:  Past Surgical History:   Procedure Laterality Date     BREAST BIOPSY, CORE RT/LT Right 08/11/2022     CHOLECYSTECTOMY  01/01/1966     HYSTERECTOMY TOTAL ABDOMINAL, BILATERAL SALPINGO-OOPHORECTOMY, COMBINED      age 43     LUMPECTOMY BREAST  01/07/2013    Left breast     LUMPECTOMY BREAST Right 09/16/2022    Dr. Joseph     REPAIR ENTROPION Left 04/19/2021    Procedure: REPAIR, ENTROPION;  Surgeon: Susy Marie MD;  Location: [facility] OR     SPINE  "SURGERY      Lumbar laminectomy     US BIOPSY PAROTID FINE NEEDLE ASPIRATION Right 2022    [place] Tumor     ZZC ANESTH,BLEPHAROPLASTY       Right total hip arthroplasty  Left total knee arthroplasty    SOCIAL HISTORY:  Social History     Socioeconomic History     Marital status:      Spouse name: Not on file     Number of children: Not on file     Years of education: Not on file     Highest education level: Not on file   Occupational History     Not on file   Tobacco Use     Smoking status: Never     Smokeless tobacco: Never   Vaping Use     Vaping status: Not on file   Substance and Sexual Activity     Alcohol use: No     Drug use: No     Sexual activity: Not Currently     Partners: Male   Other Topics Concern     Parent/sibling w/ CABG, MI or angioplasty before 65F 55M? Not Asked   Social History Narrative     Not on file     Social Determinants of Health     Financial Resource Strain: Not on file   Food Insecurity: Not on file   Transportation Needs: Not on file   Physical Activity: Not on file   Stress: Not on file   Social Connections: Not on file   Intimate Partner Violence: Not on file   Housing Stability: Not on file         FAMILY HISTORY:  Family History   Problem Relation Age of Onset     Cancer Mother 40        Uterine, diagnosed late \"40s\",  at age 83     Respiratory Mother         Emphysema     Cancer Father 50        prostate with bone mets,  age 91     Cancer Brother 80        prostate, radiation therapy, living     Cancer Maternal Grandmother         Cancer of the gallbladder     Heart Disease Maternal Grandfather      Cancer Paternal Grandmother         Breast, age unknown     Heart Disease Paternal Grandfather      Breast Cancer Paternal Aunt      Patient denies history of blood clots for herself, states that her family history of blood clots is related to her sister who was admitted for a hysterectomy followed by complications of peritonitis and \"blood clots " "throughout her whole body\" which was eventually fatal.      PHYSICAL EXAM:  Vital signs:  BP (!) 148/72 (BP Location: Right arm, Patient Position: Chair, Cuff Size: Adult Regular)   Pulse 62   Temp 98.2  F (36.8  C) (Oral)   Resp 20   Wt 112.5 kg (248 lb)   SpO2 99%   BMI 40.03 kg/m         GENERAL/CONSTITUTIONAL: No acute distress.  EYES: Pupils are equal and round. Extraocular movements intact without nystagmus.  No scleral icterus.  RESPIRATORY: Equal chest rise.   MUSCULOSKELETAL: Warm and well-perfused, no cyanosis, clubbing, or edema.   NEUROLOGIC: Cranial nerves are grossly intact. Alert, oriented to person, place and time, answers questions appropriately.  INTEGUMENTARY: No rashes or jaundice.  GAIT: Steady, does not use assistive device        LABS:   Latest Reference Range & Units 06/15/23 13:32   Sodium 136 - 145 mmol/L 142   Potassium 3.4 - 5.3 mmol/L 4.0   Chloride 98 - 107 mmol/L 106   Carbon Dioxide (CO2) 22 - 29 mmol/L 26   Urea Nitrogen 8.0 - 23.0 mg/dL 13.0   Creatinine 0.51 - 0.95 mg/dL 0.75   GFR Estimate >60 mL/min/1.73m2 78   Calcium 8.8 - 10.2 mg/dL 8.9   Anion Gap 7 - 15 mmol/L 10   Albumin 3.5 - 5.2 g/dL 4.1   Protein Total 6.4 - 8.3 g/dL 6.5   Alkaline Phosphatase 35 - 104 U/L 45   ALT 0 - 50 U/L 11   AST 0 - 45 U/L 24   Bilirubin Total <=1.2 mg/dL 0.3   Glucose 70 - 99 mg/dL 158 (H)   WBC 4.0 - 11.0 10e3/uL 7.8   Hemoglobin 11.7 - 15.7 g/dL 13.2   Hematocrit 35.0 - 47.0 % 37.7   Platelet Count 150 - 450 10e3/uL 216   RBC Count 3.80 - 5.20 10e6/uL 3.97   MCV 78 - 100 fL 95   MCH 26.5 - 33.0 pg 33.2 (H)   MCHC 31.5 - 36.5 g/dL 35.0   RDW 10.0 - 15.0 % 12.7   % Neutrophils % 55   % Lymphocytes % 31   % Monocytes % 10   % Eosinophils % 3   % Basophils % 1   Absolute Basophils 0.0 - 0.2 10e3/uL 0.1   Absolute Eosinophils 0.0 - 0.7 10e3/uL 0.3   Absolute Immature Granulocytes <=0.4 10e3/uL 0.0   Absolute Lymphocytes 0.8 - 5.3 10e3/uL 2.5   Absolute Monocytes 0.0 - 1.3 10e3/uL 0.8   % " Immature Granulocytes % 0   Absolute Neutrophils 1.6 - 8.3 10e3/uL 4.2   Absolute NRBCs 10e3/uL 0.0   NRBCs per 100 WBC <1 /100 0   (H): Data is abnormally high    PATHOLOGY:      IMAGING:      ASSESSMENT/PLAN:  Leida Goddard is a 82 year old female with:      #RIGHT invasive ductal carcinoma, pT1c Nx  - 8/22- ultrasound-guided biopsy of right breast lesion with clip placement- Pathology is not available at this time for review except as noted in lumpectomy: ER positive (%), CT positive (80-90%), HER2 0 on IHC  - 9/2022 right breast lumpectomy at [facility]: IDC, 1.7cm single focus, grade 3, no DCIS, no LVI, margins negative, no LN submitted/found, AJCC 8th edition stage 1A pT1c pNx  -10/22 radiation oncology consultation with Dr. Michelle: Based on CALGB 9343 ([employer] [facility] 2013) trial, will omit radiation therapy  -11/22 Oncotype DX breast recurrence score: 18  Given patient is clinically node-negative based on ultrasound, pathologically Nx, the following information available:  A) node-negative: Distant recurrence risk at 9 years with endocrine therapy alone 5%, group average absolute chemotherapy benefit in patients over 50 years old less than 1%  B) micrometastases and node positive (1-3): Distant recurrence at 9 years with endocrine therapy alone 16%, chemotherapy benefit for this group cannot be excluded (SEER registry outcomes in patients treated without chemotherapy with recurrence score 16-20= 96.7%)  C) node positive (>/= 4), distant recurrence at 9 years with endocrine therapy alone: 50%  - The above Oncotype results were discussed, including the fact that we have no suspicious lymph nodes clinically based on ultrasound imaging and physical exam but no pathological proof of node-negative disease.  Patient is aware that there may be a possibility of at least micrometastatic disease within the lymph nodes. Risks, benefits of chemotherapy discussed. Given the above information, patient does not  wish to proceed with any form of chemotherapy.  - 12/22 started tamoxifen, plan for at least 5 years of tx (order BCI prior to discontinue)    PLAN:  - discontinue tamoxifen, start letrozole 6/16/23  - stay on lexapro 5mg for 6 weeks, if not improved can increase to 10mg   - mammogram due 7/23- ordered today  - 6/23 CBC, CMP WNL    #LEFT breast cancer multifocal microscopic invasive ductal  carcinoma, pT1a (M) NX (2012)  -ER positive, TN positive, HER2 negative on FISH   -s/p lumpectomy, RT, anastrazole 1mg from at least 1/8/14 to 1/19/18  -Patient has had bilateral breast cancer, referral for genetic counseling place 10/22, pending appt and reconsulted again today (as this may be relevant for patient's family (1 daughter Fatimah, adopted; 1 sister passed; pt has several nieces she is concerned about))    #Osteoporosis  - 7/22 DEXA: osteoporosis   - next DEXA due 7/2024  - diagnosis 9/2017, Fosamax held due to myalgias, thinks she got IV medication for osteoporosis in 7/22 at [facility]   - Patient was given Zometa 5 mg on 8/8/2022 with Dr. Samantha Grissom and plan for annual treatment, can continue treatment w/PCP due in 8/23   - will increase zometa to 4mg q6 months, starting 6/23- ordered today   - continue vitamin D, start calcium    RTC 6 months for f/u with me, labs prior to appt, zometa     Rhonda Germain DO  Hematology/Oncology  [facility]        Again, thank you for allowing me to participate in the care of your patient.        Sincerely,        RHONDA GERMAIN, DO

## 2023-06-15 NOTE — NURSING NOTE
"Oncology Rooming Note    Nerissa 15, 2023 1:51 PM   Leida Goddard is a 83 year old female who presents for:    Chief Complaint   Patient presents with     Oncology Clinic Visit     3 month follow up     Initial Vitals: BP (!) 148/72 (BP Location: Right arm, Patient Position: Chair, Cuff Size: Adult Regular)   Pulse 62   Temp 98.2  F (36.8  C) (Oral)   Resp 20   Wt 112.5 kg (248 lb)   SpO2 99%   BMI 40.03 kg/m   Estimated body mass index is 40.03 kg/m  as calculated from the following:    Height as of 4/13/21: 1.676 m (5' 6\").    Weight as of this encounter: 112.5 kg (248 lb). Body surface area is 2.29 meters squared.  No Pain (0) Comment: Data Unavailable   No LMP recorded. Patient has had a hysterectomy.  Allergies reviewed: Yes  Medications reviewed: Yes    Medications: MEDICATION REFILLS NEEDED TODAY. Provider was notified.  Pharmacy name entered into [facility]: [id number] [place], [place] - [address] [address] [facility] AT [facility]    Clinical concerns: Tamoxifen side effects Dr. Rangel was notified.      Alisson Motley, RN              "

## 2023-06-15 NOTE — PROGRESS NOTES
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  Patient is s/p hysterectomy and BSO.     Patient's previous work-up was done at [facility].  I have reviewed the records in care everywhere and they are summarized below.     Regarding LEFT breast cancer 2012:   -2012-screening mammogram showed grouped calcifications in the left breast at 7:00 posterior depth, increased in number, confirmed on ultrasound  - 2012-stereotactic biopsy with clip placement, pathology showed LCIS with fibroadenomatoid changes with microcalcifications  - 2013- left breast lumpectomy with pathology showin microscopic foci of invasive lobular carcinoma, largest focus 0.11 cm others 0.1 cm, 0.08 cm, 0.05 cm, overall grade 2, margins negative, multiple scattered foci of LCIS within the specimen, no DCIS, no LVI, no lymph nodes submitted.  %, ME 80%, HER2 2+ on IHC, FISH negative.  Pathological staging AJCC seventh edition pT1a (M) NX  - I cannot find specific detailed records of this however notes state that patient received postlumpectomy radiation  -And anastrozole 1 mg daily, pt reports she took  anastroazole for 5 yerars  -Patient followed with medical oncologist Dr. Heydi Queen     Regarding recently diagnosed RIGHT breast cancer:  -6/22- pt felt right breast shooting pains radiating to nipple, exacerbated by crocheting     -7/22 bilateral screening mammogram- heterogeneously dense breast, postsurgical changes in the left breast, scattered calcifications in right breast.  1.5 cm asymmetry, in line with the nipple, deep within the right breast, needs further evaluation     - 8/22- right mammogram: Spiculated mass measuring 18 mm in greatest dimension with surrounding architectural distortion, at 9 o'clock position posterior depth, scattered benign calcifications     -8/22- right breast ultrasound: Spiculated hypoechoic mass at the 9 o'clock position posterior depth measuring 1.5 cm in greatest dimension, right axilla no abnormal lymph nodes, BI-RADS 5     - 8/22- ultrasound-guided biopsy of right breast lesion with clip placement- 5x14 gauge core biopsies obtained  --- Pathology is not available at this time for review     -9/16/2022-right breast lumpectomy with second excision for involved margins with pathology showing:  A.  Right breast tissue, radioactive seed guided lumpectomy-Invasive ductal carcinoma, involving inferior and medial margins.  B.  Right breast inferior medial margin, excision-Invasive ductal carcinoma. New inferior and medial margins are free.  ---Invasive ductal carcinoma, grade 3, 1.7 cm, no LVI, dermal lymphovascular invasion N/A, all margins negative for invasive carcinoma (0.55 cm to new inferior margin on specimen B), regional lymph node status N/A (no lymph nodes submitted or found)  ---ER 90 to 100%  --- SC 80 to 90%  --- HER2 zero on IHC  --- Pathologic stage classification (AJCC eighth edition) pT1c NX     Microscopic Description      A.  Sections demonstrate breast parenchyma with tumor mass composed of sheets and cords of markedly pleomorphic cells with scattered mitotic  figures, including locally increased mitotic activity (T3 N3 M2).  The inferior and medial margins are involved by tumor.  There is prominent perineural invasion.  B.  Sections demonstrate breast parenchyma with invasive ductal carcinoma similar to that in specimen A.  The closest margin is inferior and measures 0.55 cm.     -11/22 Oncotype DX breast recurrence score: 18  Given patient is clinically node-negative based on ultrasound, pathologically Nx, the following information available:  A) node-negative: Distant recurrence risk at 9 years with endocrine therapy alone 5%, group average absolute chemotherapy benefit in patients over 50 years old less than 1%  B) micrometastases and node positive (1-3): Distant recurrence at 9 years with endocrine therapy alone 16%, chemotherapy benefit for this group cannot be excluded (see registry outcomes in patients treated without chemotherapy with recurrence score 16-20= 96.7%, versus recurrence score 0-10= 98.2%)  C) node positive (>/= 4), distant recurrence at 9 years with endocrine therapy alone: 50%    -10/22 radiation oncology consultation with Dr. Michelle: Based on CALGB 9343 ([place], [facility] 2013) trial, will omit radiation therapy    - 12/22 started tamoxifen     INTERIM HISTORY:  Pt continues on tamoxifen but feels this is medication is not working well for her. She feels her personality has changed, fatigued, very tired, not doing things she found andrade in like crocheting, has noted increased BP requiring cozaar adjustment. Overall, she reports her quality of life is poor on tamoxifen and doesn't want to take this any more.     5/23 stopped taking all antidepressants, continued to have depressed mood, restarted on lexapro 5mg about 2 weeks ago.      REVIEW OF SYSTEMS:   A 14 point ROS was reviewed with pertinent positives and negatives in the HPI.       HOME MEDICATIONS:  Current Outpatient Medications   Medication Sig Dispense Refill     cholecalciferol (VITAMIN D3) 10 mcg  (400 units) TABS tablet Take 10 mcg by mouth daily       escitalopram (LEXAPRO) 10 MG tablet Take 1 tablet (10 mg) by mouth daily (Patient taking differently: Take 5 mg by mouth daily) 90 tablet 3     fluticasone (FLONASE) 50 MCG/ACT nasal spray Spray 1 spray into both nostrils 2 times daily       furosemide (LASIX) 40 MG tablet Take 20 mg by mouth daily        LEVOTHYROXINE SODIUM 25 MCG OR TABS 1 TABLET DAILY       losartan (COZAAR) 100 MG tablet Take 100 mg by mouth daily.       magnesium 250 MG tablet Take 1 tablet by mouth 2 times daily       potassium chloride ER (K-DUR/KLOR-CON M) 20 MEQ CR tablet Take 20 mEq by mouth 2 times daily       PROPRANOLOL HCL PO Take 40 mg by mouth 2 times daily.       tamoxifen (NOLVADEX) 20 MG tablet Take 1 tablet (20 mg) by mouth daily 30 tablet 6     albuterol (PROAIR HFA/PROVENTIL HFA/VENTOLIN HFA) 108 (90 Base) MCG/ACT inhaler Inhale 2 puffs into the lungs (Patient not taking: Reported on 3/9/2023)       calcium citrate-vitamin D (CITRACAL) 315-200 MG-UNIT TABS per tablet Take 1 tablet by mouth daily (Patient not taking: Reported on 6/15/2023)       mirtazapine (REMERON) 7.5 MG tablet Take 7.5 mg by mouth At Bedtime (Patient not taking: Reported on 6/15/2023)       omeprazole (PRILOSEC) 20 MG DR capsule Take 20 mg by mouth daily (Patient not taking: Reported on 6/15/2023)       potassium chloride SA (K-DUR,KLOR-CON M) 20 MEQ tablet Take by mouth daily (Patient not taking: Reported on 6/15/2023)       triamcinolone (KENALOG) 0.1 % external cream Apply twice daily as needed to the back, up to two weeks on at one time. (Patient not taking: Reported on 3/9/2023) 80 g 11     vitamin D2 (ERGOCALCIFEROL) 69107 units (1250 mcg) capsule Take 50,000 Units by mouth once a week (Patient not taking: Reported on 6/15/2023)           ALLERGIES:  Allergies   Allergen Reactions     Metformin Nausea and Vomiting     Adhesive Tape Hives     Amoxicillin-Pot Clavulanate Diarrhea     Doxycycline  Nausea and Vomiting     Food      Lactose Intolerant     Hctz Rash     Lisinopril Cough         PAST MEDICAL HISTORY:  Past Medical History:   Diagnosis Date     Breast cancer (H) 12/07/2012    Left breast     Breast cancer (H) 08/11/2022    Right Breast     Essential hypertension, benign     Hypertension, Benign     DALIA (obstructive sleep apnea)      S/P radiation therapy     6,040 cGy to left breast completed on 4/9/2013 [facility]     Type II or unspecified type diabetes mellitus without mention of complication, not stated as uncontrolled     Diabetes mellitus   LEFT pT1a (M) NX multifocal microscopic invasive lobular carcinoma, ER positive, [place] positive, HER2 negative on FISH s/p lumpectomy, RT, anastrazole (2012), osteoporosis on 7/22 DEXA (diagnosis 9/2017, Fosamax held due to myalgias, ?IV bisphophonate therapy at [facility]), 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      PAST SURGICAL HISTORY:  Past Surgical History:   Procedure Laterality Date     BREAST BIOPSY, CORE RT/LT Right 08/11/2022     CHOLECYSTECTOMY  01/01/1966     HYSTERECTOMY TOTAL ABDOMINAL, BILATERAL SALPINGO-OOPHORECTOMY, COMBINED      age 43     LUMPECTOMY BREAST  01/07/2013    Left breast     LUMPECTOMY BREAST Right 09/16/2022    Dr. Joseph     REPAIR ENTROPION Left 04/19/2021    Procedure: REPAIR, ENTROPION;  Surgeon: Susy Marie MD;  Location: [facility] OR     SPINE SURGERY      Lumbar laminectomy     US BIOPSY PAROTID FINE NEEDLE ASPIRATION Right 02/08/2022    [place] Tumor     ZZC ANESTH,BLEPHAROPLASTY       Right total hip arthroplasty  Left total knee arthroplasty    SOCIAL HISTORY:  Social History     Socioeconomic History     Marital status:       "Spouse name: Not on file     Number of children: Not on file     Years of education: Not on file     Highest education level: Not on file   Occupational History     Not on file   Tobacco Use     Smoking status: Never     Smokeless tobacco: Never   Vaping Use     Vaping status: Not on file   Substance and Sexual Activity     Alcohol use: No     Drug use: No     Sexual activity: Not Currently     Partners: Male   Other Topics Concern     Parent/sibling w/ CABG, MI or angioplasty before 65F 55M? Not Asked   Social History Narrative     Not on file     Social Determinants of Health     Financial Resource Strain: Not on file   Food Insecurity: Not on file   Transportation Needs: Not on file   Physical Activity: Not on file   Stress: Not on file   Social Connections: Not on file   Intimate Partner Violence: Not on file   Housing Stability: Not on file         FAMILY HISTORY:  Family History   Problem Relation Age of Onset     Cancer Mother 40        Uterine, diagnosed late \"40s\",  at age 83     Respiratory Mother         Emphysema     Cancer Father 50        prostate with bone mets,  age 91     Cancer Brother 80        prostate, radiation therapy, living     Cancer Maternal Grandmother         Cancer of the gallbladder     Heart Disease Maternal Grandfather      Cancer Paternal Grandmother         Breast, age unknown     Heart Disease Paternal Grandfather      Breast Cancer Paternal Aunt      Patient denies history of blood clots for herself, states that her family history of blood clots is related to her sister who was admitted for a hysterectomy followed by complications of peritonitis and \"blood clots throughout her whole body\" which was eventually fatal.      PHYSICAL EXAM:  Vital signs:  BP (!) 148/72 (BP Location: Right arm, Patient Position: Chair, Cuff Size: Adult Regular)   Pulse 62   Temp 98.2  F (36.8  C) (Oral)   Resp 20   Wt 112.5 kg (248 lb)   SpO2 99%   BMI 40.03 kg/m   "       GENERAL/CONSTITUTIONAL: No acute distress.  EYES: Pupils are equal and round. Extraocular movements intact without nystagmus.  No scleral icterus.  RESPIRATORY: Equal chest rise.   MUSCULOSKELETAL: Warm and well-perfused, no cyanosis, clubbing, or edema.   NEUROLOGIC: Cranial nerves are grossly intact. Alert, oriented to person, place and time, answers questions appropriately.  INTEGUMENTARY: No rashes or jaundice.  GAIT: Steady, does not use assistive device        LABS:   Latest Reference Range & Units 06/15/23 13:32   Sodium 136 - 145 mmol/L 142   Potassium 3.4 - 5.3 mmol/L 4.0   Chloride 98 - 107 mmol/L 106   Carbon Dioxide (CO2) 22 - 29 mmol/L 26   Urea Nitrogen 8.0 - 23.0 mg/dL 13.0   Creatinine 0.51 - 0.95 mg/dL 0.75   GFR Estimate >60 mL/min/1.73m2 78   Calcium 8.8 - 10.2 mg/dL 8.9   Anion Gap 7 - 15 mmol/L 10   Albumin 3.5 - 5.2 g/dL 4.1   Protein Total 6.4 - 8.3 g/dL 6.5   Alkaline Phosphatase 35 - 104 U/L 45   ALT 0 - 50 U/L 11   AST 0 - 45 U/L 24   Bilirubin Total <=1.2 mg/dL 0.3   Glucose 70 - 99 mg/dL 158 (H)   WBC 4.0 - 11.0 10e3/uL 7.8   Hemoglobin 11.7 - 15.7 g/dL 13.2   Hematocrit 35.0 - 47.0 % 37.7   Platelet Count 150 - 450 10e3/uL 216   RBC Count 3.80 - 5.20 10e6/uL 3.97   MCV 78 - 100 fL 95   MCH 26.5 - 33.0 pg 33.2 (H)   MCHC 31.5 - 36.5 g/dL 35.0   RDW 10.0 - 15.0 % 12.7   % Neutrophils % 55   % Lymphocytes % 31   % Monocytes % 10   % Eosinophils % 3   % Basophils % 1   Absolute Basophils 0.0 - 0.2 10e3/uL 0.1   Absolute Eosinophils 0.0 - 0.7 10e3/uL 0.3   Absolute Immature Granulocytes <=0.4 10e3/uL 0.0   Absolute Lymphocytes 0.8 - 5.3 10e3/uL 2.5   Absolute Monocytes 0.0 - 1.3 10e3/uL 0.8   % Immature Granulocytes % 0   Absolute Neutrophils 1.6 - 8.3 10e3/uL 4.2   Absolute NRBCs 10e3/uL 0.0   NRBCs per 100 WBC <1 /100 0   (H): Data is abnormally high    PATHOLOGY:      IMAGING:      ASSESSMENT/PLAN:  Leida Goddard is a 82 year old female with:      #RIGHT invasive ductal  carcinoma, pT1c Nx  - 8/22- ultrasound-guided biopsy of right breast lesion with clip placement- Pathology is not available at this time for review except as noted in lumpectomy: ER positive (%), NM positive (80-90%), HER2 0 on IHC  - 9/2022 right breast lumpectomy at [facility]: IDC, 1.7cm single focus, grade 3, no DCIS, no LVI, margins negative, no LN submitted/found, AJCC 8th edition stage 1A pT1c pNx  -10/22 radiation oncology consultation with Dr. Michelle: Based on CALGB 9343 ([employer] [facility] 2013) trial, will omit radiation therapy  -11/22 Oncotype DX breast recurrence score: 18  Given patient is clinically node-negative based on ultrasound, pathologically Nx, the following information available:  A) node-negative: Distant recurrence risk at 9 years with endocrine therapy alone 5%, group average absolute chemotherapy benefit in patients over 50 years old less than 1%  B) micrometastases and node positive (1-3): Distant recurrence at 9 years with endocrine therapy alone 16%, chemotherapy benefit for this group cannot be excluded (SEER registry outcomes in patients treated without chemotherapy with recurrence score 16-20= 96.7%)  C) node positive (>/= 4), distant recurrence at 9 years with endocrine therapy alone: 50%  - The above Oncotype results were discussed, including the fact that we have no suspicious lymph nodes clinically based on ultrasound imaging and physical exam but no pathological proof of node-negative disease.  Patient is aware that there may be a possibility of at least micrometastatic disease within the lymph nodes. Risks, benefits of chemotherapy discussed. Given the above information, patient does not wish to proceed with any form of chemotherapy.  - 12/22 started tamoxifen, plan for at least 5 years of tx (order [facility]CI prior to discontinue)    PLAN:  - discontinue tamoxifen, start letrozole 6/16/23  - stay on lexapro 5mg for 6 weeks, if not improved can increase to 10mg   - mammogram due  7/23- ordered today  - 6/23 CBC, CMP WNL    #LEFT breast cancer multifocal microscopic invasive ductal  carcinoma, pT1a (M) NX (2012)  -ER positive, TX positive, HER2 negative on FISH   -s/p lumpectomy, RT, anastrazole 1mg from at least 1/8/14 to 1/19/18  -Patient has had bilateral breast cancer, referral for genetic counseling place 10/22, pending appt and reconsulted again today (as this may be relevant for patient's family (1 daughter Fatimah, adopted; 1 sister passed; pt has several nieces she is concerned about))    #Osteoporosis  - 7/22 DEXA: osteoporosis   - next DEXA due 7/2024  - diagnosis 9/2017, Fosamax held due to myalgias, thinks she got IV medication for osteoporosis in 7/22 at [facility]   - Patient was given Zometa 5 mg on 8/8/2022 with Dr. Samantha Grissom and plan for annual treatment, can continue treatment w/PCP due in 8/23   - will increase zometa to 4mg q6 months, starting 6/23- ordered today   - continue vitamin D, start calcium    RTC 6 months for f/u with me, labs prior to appt, zometa     Tracy Rangel,   Hematology/Oncology  [facility]

## 2023-06-15 NOTE — PROGRESS NOTES
Writer received Genetics - Cancer Risk referral, referred for:     Invasive ductal carcinoma of breast, female, right (H)     Reviewed for appropriate plan, and sent to New Patient Scheduling for completion.

## 2023-07-12 ENCOUNTER — ANCILLARY PROCEDURE (OUTPATIENT)
Dept: MAMMOGRAPHY | Facility: CLINIC | Age: 83
End: 2023-07-12
Attending: INTERNAL MEDICINE
Payer: COMMERCIAL

## 2023-07-12 DIAGNOSIS — Z17.0 MALIGNANT NEOPLASM OF OVERLAPPING SITES OF RIGHT BREAST IN FEMALE, ESTROGEN RECEPTOR POSITIVE (H): ICD-10-CM

## 2023-07-12 DIAGNOSIS — C50.811 MALIGNANT NEOPLASM OF OVERLAPPING SITES OF RIGHT BREAST IN FEMALE, ESTROGEN RECEPTOR POSITIVE (H): ICD-10-CM

## 2023-07-12 DIAGNOSIS — C50.911 INVASIVE DUCTAL CARCINOMA OF BREAST, FEMALE, RIGHT (H): ICD-10-CM

## 2023-07-12 PROCEDURE — 77066 DX MAMMO INCL CAD BI: CPT | Performed by: RADIOLOGY

## 2023-07-12 PROCEDURE — G0279 TOMOSYNTHESIS, MAMMO: HCPCS | Performed by: RADIOLOGY

## 2023-07-14 ENCOUNTER — INFUSION THERAPY VISIT (OUTPATIENT)
Dept: INFUSION THERAPY | Facility: CLINIC | Age: 83
End: 2023-07-14
Payer: COMMERCIAL

## 2023-07-14 VITALS
RESPIRATION RATE: 16 BRPM | OXYGEN SATURATION: 95 % | WEIGHT: 251 LBS | HEART RATE: 71 BPM | BODY MASS INDEX: 40.51 KG/M2 | DIASTOLIC BLOOD PRESSURE: 54 MMHG | SYSTOLIC BLOOD PRESSURE: 127 MMHG | TEMPERATURE: 98.4 F

## 2023-07-14 DIAGNOSIS — M81.0 AGE-RELATED OSTEOPOROSIS WITHOUT CURRENT PATHOLOGICAL FRACTURE: Primary | ICD-10-CM

## 2023-07-14 PROCEDURE — 96365 THER/PROPH/DIAG IV INF INIT: CPT | Performed by: INTERNAL MEDICINE

## 2023-07-14 PROCEDURE — 99207 PR NO CHARGE LOS: CPT

## 2023-07-14 RX ORDER — ZOLEDRONIC ACID 0.04 MG/ML
4 INJECTION, SOLUTION INTRAVENOUS ONCE
Status: COMPLETED | OUTPATIENT
Start: 2023-07-14 | End: 2023-07-14

## 2023-07-14 RX ADMIN — ZOLEDRONIC ACID 4 MG: 0.04 INJECTION, SOLUTION INTRAVENOUS at 14:25

## 2023-07-14 RX ADMIN — Medication 250 ML: at 14:22

## 2023-07-14 NOTE — PROGRESS NOTES
Infusion Nursing Note:  Leida Goddard presents today for Zometa.    Patient seen by provider today: No   present during visit today: Not Applicable.    Note: Patient reports receiving Zometa almost 1 year ago, does not remember having any difficulty with it. Reminded patient to continue calcium and vitamin day, drink 6-8 glasses of water today and tomorrow. Patient verbalized understanding.    Intravenous Access:  Peripheral IV placed.    Treatment Conditions:  Lab Results   Component Value Date     06/15/2023    POTASSIUM 4.0 06/15/2023    CR 0.75 06/15/2023    JAZMYNE 8.9 06/15/2023    BILITOTAL 0.3 06/15/2023    ALBUMIN 4.1 06/15/2023    ALT 11 06/15/2023    AST 24 06/15/2023     Results reviewed, labs MET treatment parameters, ok to proceed with treatment.    Post Infusion Assessment:  Patient tolerated infusion without incident.  Site patent and intact, free from redness, edema or discomfort.  No evidence of extravasations.  Access discontinued per protocol.     Discharge Plan:   Future appts have been reviewed and crosschecked with appt note and plan.  AVS to patient via [place].  Patient will return 1/18/2023 for next appointment.   Patient discharged in stable condition accompanied by: self.  Departure Mode: Ambulatory.      Milagro Rosa RN BSN OCN

## 2024-01-18 ENCOUNTER — LAB (OUTPATIENT)
Dept: INFUSION THERAPY | Facility: CLINIC | Age: 84
End: 2024-01-18
Attending: INTERNAL MEDICINE
Payer: COMMERCIAL

## 2024-01-18 ENCOUNTER — ONCOLOGY VISIT (OUTPATIENT)
Dept: ONCOLOGY | Facility: CLINIC | Age: 84
End: 2024-01-18
Attending: INTERNAL MEDICINE
Payer: COMMERCIAL

## 2024-01-18 VITALS
TEMPERATURE: 98.2 F | DIASTOLIC BLOOD PRESSURE: 70 MMHG | SYSTOLIC BLOOD PRESSURE: 122 MMHG | OXYGEN SATURATION: 99 % | HEART RATE: 60 BPM | RESPIRATION RATE: 18 BRPM

## 2024-01-18 DIAGNOSIS — C50.911 INVASIVE DUCTAL CARCINOMA OF BREAST, FEMALE, RIGHT (H): ICD-10-CM

## 2024-01-18 DIAGNOSIS — M81.0 AGE-RELATED OSTEOPOROSIS WITHOUT CURRENT PATHOLOGICAL FRACTURE: Primary | ICD-10-CM

## 2024-01-18 DIAGNOSIS — L28.2 PRURITIC RASH: ICD-10-CM

## 2024-01-18 DIAGNOSIS — Z12.31 ENCOUNTER FOR SCREENING MAMMOGRAM FOR MALIGNANT NEOPLASM OF BREAST: ICD-10-CM

## 2024-01-18 DIAGNOSIS — R73.09 OTHER ABNORMAL GLUCOSE: ICD-10-CM

## 2024-01-18 LAB
ALBUMIN SERPL BCG-MCNC: 4.7 G/DL (ref 3.5–5.2)
ALP SERPL-CCNC: 52 U/L (ref 40–150)
ALT SERPL W P-5'-P-CCNC: 19 U/L (ref 0–50)
ANION GAP SERPL CALCULATED.3IONS-SCNC: 11 MMOL/L (ref 7–15)
AST SERPL W P-5'-P-CCNC: 26 U/L (ref 0–45)
BASOPHILS # BLD AUTO: 0.1 10E3/UL (ref 0–0.2)
BASOPHILS NFR BLD AUTO: 1 %
BILIRUB SERPL-MCNC: 0.6 MG/DL
BUN SERPL-MCNC: 13.6 MG/DL (ref 8–23)
CALCIUM SERPL-MCNC: 9.5 MG/DL (ref 8.8–10.2)
CHLORIDE SERPL-SCNC: 103 MMOL/L (ref 98–107)
CREAT SERPL-MCNC: 0.78 MG/DL (ref 0.51–0.95)
DEPRECATED HCO3 PLAS-SCNC: 27 MMOL/L (ref 22–29)
EGFRCR SERPLBLD CKD-EPI 2021: 74 ML/MIN/1.73M2
EOSINOPHIL # BLD AUTO: 0.2 10E3/UL (ref 0–0.7)
EOSINOPHIL NFR BLD AUTO: 2 %
ERYTHROCYTE [DISTWIDTH] IN BLOOD BY AUTOMATED COUNT: 12.8 % (ref 10–15)
GLUCOSE SERPL-MCNC: 130 MG/DL (ref 70–99)
HCT VFR BLD AUTO: 40 % (ref 35–47)
HGB BLD-MCNC: 13.6 G/DL (ref 11.7–15.7)
IMM GRANULOCYTES # BLD: 0 10E3/UL
IMM GRANULOCYTES NFR BLD: 0 %
LYMPHOCYTES # BLD AUTO: 2.3 10E3/UL (ref 0.8–5.3)
LYMPHOCYTES NFR BLD AUTO: 25 %
MCH RBC QN AUTO: 32.9 PG (ref 26.5–33)
MCHC RBC AUTO-ENTMCNC: 34 G/DL (ref 31.5–36.5)
MCV RBC AUTO: 97 FL (ref 78–100)
MONOCYTES # BLD AUTO: 1 10E3/UL (ref 0–1.3)
MONOCYTES NFR BLD AUTO: 11 %
NEUTROPHILS # BLD AUTO: 5.7 10E3/UL (ref 1.6–8.3)
NEUTROPHILS NFR BLD AUTO: 61 %
NRBC # BLD AUTO: 0 10E3/UL
NRBC BLD AUTO-RTO: 0 /100
PLATELET # BLD AUTO: 201 10E3/UL (ref 150–450)
POTASSIUM SERPL-SCNC: 4.2 MMOL/L (ref 3.4–5.3)
PROT SERPL-MCNC: 7.6 G/DL (ref 6.4–8.3)
RBC # BLD AUTO: 4.13 10E6/UL (ref 3.8–5.2)
SODIUM SERPL-SCNC: 141 MMOL/L (ref 135–145)
WBC # BLD AUTO: 9.2 10E3/UL (ref 4–11)

## 2024-01-18 PROCEDURE — 99207 PR NO CHARGE LOS: CPT

## 2024-01-18 PROCEDURE — 250N000011 HC RX IP 250 OP 636: Mod: JZ | Performed by: INTERNAL MEDICINE

## 2024-01-18 PROCEDURE — G0463 HOSPITAL OUTPT CLINIC VISIT: HCPCS | Mod: 25 | Performed by: INTERNAL MEDICINE

## 2024-01-18 PROCEDURE — 99214 OFFICE O/P EST MOD 30 MIN: CPT | Performed by: INTERNAL MEDICINE

## 2024-01-18 PROCEDURE — 258N000003 HC RX IP 258 OP 636: Performed by: INTERNAL MEDICINE

## 2024-01-18 PROCEDURE — 96365 THER/PROPH/DIAG IV INF INIT: CPT

## 2024-01-18 PROCEDURE — 36415 COLL VENOUS BLD VENIPUNCTURE: CPT

## 2024-01-18 PROCEDURE — 80053 COMPREHEN METABOLIC PANEL: CPT

## 2024-01-18 PROCEDURE — 85025 COMPLETE CBC W/AUTO DIFF WBC: CPT

## 2024-01-18 RX ORDER — LETROZOLE 2.5 MG/1
2.5 TABLET, FILM COATED ORAL DAILY
Qty: 90 TABLET | Refills: 3 | Status: SHIPPED | OUTPATIENT
Start: 2024-01-18 | End: 2024-07-19

## 2024-01-18 RX ORDER — ZOLEDRONIC ACID 0.04 MG/ML
4 INJECTION, SOLUTION INTRAVENOUS ONCE
Status: COMPLETED | OUTPATIENT
Start: 2024-01-18 | End: 2024-01-18

## 2024-01-18 RX ADMIN — SODIUM CHLORIDE 250 ML: 9 INJECTION, SOLUTION INTRAVENOUS at 14:33

## 2024-01-18 RX ADMIN — ZOLEDRONIC ACID 4 MG: 0.04 INJECTION, SOLUTION INTRAVENOUS at 14:36

## 2024-01-18 ASSESSMENT — PAIN SCALES - GENERAL: PAINLEVEL: NO PAIN (0)

## 2024-01-18 NOTE — LETTER
2024         RE: Leida Goddard  [address]  [facility] [address]        Dear Colleague,    Thank you for referring your patient, Leida Goddard, to the [facility]. Please see a copy of my visit note below.    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  Patient is s/p hysterectomy and BSO.     Patient's previous work-up was done at [facility].  I have reviewed the records in care everywhere and they are summarized below.     Regarding LEFT breast cancer 2012:   - 2012-screening mammogram showed grouped calcifications in the left breast at 7:00 posterior depth, increased in number, confirmed on ultrasound  - 2012-stereotactic biopsy with clip placement, pathology showed LCIS with fibroadenomatoid changes with microcalcifications  - 2013- left breast lumpectomy with pathology showin microscopic foci of invasive lobular carcinoma, largest focus 0.11 cm others 0.1 cm, 0.08 cm, 0.05 cm, overall grade 2, margins negative, multiple scattered foci of LCIS within the specimen, no DCIS, no LVI, no lymph nodes submitted.  ER  100%, TN 80%, HER2 2+ on IHC, FISH negative.  Pathological staging AJCC seventh edition pT1a (M) NX  - I cannot find specific detailed records of this however notes state that patient received postlumpectomy radiation  - On anastrozole 1 mg daily, pt reports she took anastroazole for 5 yerars  - Patient followed with medical oncologist Dr. Heydi Queen     Regarding recently diagnosed RIGHT breast cancer:  -6/22- pt felt right breast shooting pains radiating to nipple, exacerbated by crocheting     -7/22 bilateral screening mammogram- heterogeneously dense breast, postsurgical changes in the left breast, scattered calcifications in right breast.  1.5 cm asymmetry, in line with the nipple, deep within the right breast, needs further evaluation     - 8/22- right mammogram: Spiculated mass measuring 18 mm in greatest dimension with surrounding architectural distortion, at 9 o'clock position posterior depth, scattered benign calcifications     -8/22- right breast ultrasound: Spiculated hypoechoic mass at the 9 o'clock position posterior depth measuring 1.5 cm in greatest dimension, right axilla no abnormal lymph nodes, BI-RADS 5     - 8/22- ultrasound-guided biopsy of right breast lesion with clip placement- 5x14 gauge core biopsies obtained  --- Pathology is not available at this time for review     -9/16/2022-right breast lumpectomy with second excision for involved margins with pathology showing:  A.  Right breast tissue, radioactive seed guided lumpectomy-Invasive ductal carcinoma, involving inferior and medial margins.  B.  Right breast inferior medial margin, excision-Invasive ductal carcinoma. New inferior and medial margins are free.  ---Invasive ductal carcinoma, grade 3, 1.7 cm, no LVI, dermal lymphovascular invasion N/A, all margins negative for invasive carcinoma (0.55 cm to new inferior margin on specimen B), regional lymph node status N/A (no lymph nodes submitted or found)  ---ER 90 to 100%  --- TN 80  to 90%  --- HER2 zero on IHC  --- Pathologic stage classification (AJCC eighth edition) pT1c NX     Microscopic Description      A.  Sections demonstrate breast parenchyma with tumor mass composed of sheets and cords of markedly pleomorphic cells with scattered mitotic figures, including locally increased mitotic activity (T3 N3 M2).  The inferior and medial margins are involved by tumor.  There is prominent perineural invasion.  B.  Sections demonstrate breast parenchyma with invasive ductal carcinoma similar to that in specimen A.  The closest margin is inferior and measures 0.55 cm.     -11/22 Oncotype DX breast recurrence score: 18  Given patient is clinically node-negative based on ultrasound, pathologically Nx, the following information available:  A) node-negative: Distant recurrence risk at 9 years with endocrine therapy alone 5%, group average absolute chemotherapy benefit in patients over 50 years old less than 1%  B) micrometastases and node positive (1-3): Distant recurrence at 9 years with endocrine therapy alone 16%, chemotherapy benefit for this group cannot be excluded (see registry outcomes in patients treated without chemotherapy with recurrence score 16-20= 96.7%, versus recurrence score 0-10= 98.2%)  C) node positive (>/= 4), distant recurrence at 9 years with endocrine therapy alone: 50%    -10/22 radiation oncology consultation with Dr. Michelle: Based on CALGB 9343 ([place], LEOO 2013) trial, will omit radiation therapy    - 12/22-6/23 tamoxifen   - 6/23 started letrozole     INTERIM HISTORY:  Pt doing great on letrozole     7/23 pruritic rash on right arm x3 weeks, in sun exposed areas, no improvement w/topical steroids, rxed prednisone 40mg w/taper.    Pt continues to have intermittent erythematous, pruritic rash on b/l forearms. She saw dermatologist, used topical steroids BID x4 weeks and then at bedtime x4 weeks and has improvement but returned. No changes in soaps, detergents. Pt thinks it is  associated with knitting     REVIEW OF SYSTEMS:   A 14 point ROS was reviewed with pertinent positives and negatives in the HPI.       HOME MEDICATIONS:  Current Outpatient Medications   Medication Sig Dispense Refill     calcium citrate-vitamin D (CITRACAL) 315-200 MG-UNIT TABS per tablet Take 1 tablet by mouth daily       cholecalciferol (VITAMIN D3) 10 mcg (400 units) TABS tablet Take 10 mcg by mouth daily       escitalopram (LEXAPRO) 10 MG tablet Take 1 tablet (10 mg) by mouth daily (Patient taking differently: Take 5 mg by mouth daily) 90 tablet 3     furosemide (LASIX) 40 MG tablet Take 20 mg by mouth daily        letrozole (FEMARA) 2.5 MG tablet Take 1 tablet (2.5 mg) by mouth daily 90 tablet 3     LEVOTHYROXINE SODIUM 25 MCG OR TABS 1 TABLET DAILY       losartan (COZAAR) 100 MG tablet Take 100 mg by mouth daily.       magnesium 250 MG tablet Take 1 tablet by mouth 2 times daily       omeprazole (PRILOSEC) 20 MG DR capsule Take 20 mg by mouth daily       potassium chloride ER (K-DUR/KLOR-CON M) 20 MEQ CR tablet Take 20 mEq by mouth 2 times daily       potassium chloride SA (K-DUR,KLOR-CON M) 20 MEQ tablet Take by mouth daily       PROPRANOLOL HCL PO Take 40 mg by mouth 2 times daily.       vitamin D2 (ERGOCALCIFEROL) 43994 units (1250 mcg) capsule Take 50,000 Units by mouth once a week       albuterol (PROAIR HFA/PROVENTIL HFA/VENTOLIN HFA) 108 (90 Base) MCG/ACT inhaler Inhale 2 puffs into the lungs (Patient not taking: Reported on 1/18/2024)       fluticasone (FLONASE) 50 MCG/ACT nasal spray Spray 1 spray into both nostrils 2 times daily (Patient not taking: Reported on 1/18/2024)       mirtazapine (REMERON) 7.5 MG tablet Take 7.5 mg by mouth At Bedtime (Patient not taking: Reported on 1/18/2024)       tamoxifen (NOLVADEX) 20 MG tablet Take 1 tablet (20 mg) by mouth daily (Patient not taking: Reported on 1/18/2024) 30 tablet 6     triamcinolone (KENALOG) 0.1 % external cream Apply twice daily as needed to  the back, up to two weeks on at one time. (Patient not taking: Reported on 1/18/2024) 80 g 11         ALLERGIES:  Allergies   Allergen Reactions     Metformin Nausea and Vomiting     Adhesive Tape Hives     Amoxicillin-Pot Clavulanate Diarrhea     Doxycycline Nausea and Vomiting     Food      Lactose Intolerant     Hctz Rash     Lisinopril Cough         PAST MEDICAL HISTORY:  Past Medical History:   Diagnosis Date     Breast cancer (H) 12/07/2012    Left breast     Breast cancer (H) 08/11/2022    Right Breast     Essential hypertension, benign     Hypertension, Benign     DALIA (obstructive sleep apnea)      S/P radiation therapy     6,040 cGy to left breast completed on 4/9/2013 [facility]     Type II or unspecified type diabetes mellitus without mention of complication, not stated as uncontrolled     Diabetes mellitus   LEFT pT1a (M) NX multifocal microscopic invasive lobular carcinoma, ER positive, PA positive, HER2 negative on FISH s/p lumpectomy, RT, anastrazole (2012), osteoporosis on 7/22 DEXA (diagnosis 9/2017, Fosamax held due to myalgias, ?[facility]oph[facility]), 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      PAST SURGICAL HISTORY:  Past Surgical History:   Procedure Laterality Date     BREAST BIOPSY, CORE RT/LT Right 08/11/2022     CHOLECYSTECTOMY  01/01/1966     HYSTERECTOMY TOTAL ABDOMINAL, BILATERAL SALPINGO-OOPHORECTOMY, COMBINED      age 43     LUMPECTOMY BREAST  01/07/2013    Left breast     LUMPECTOMY BREAST Right 09/16/2022    Dr. Joseph     REPAIR ENTROPION Left 04/19/2021    Procedure: REPAIR, ENTROPION;  Surgeon: Susy Marie MD;  Location: [facility] OR     SPINE SURGERY      Lumbar laminectomy     US  "BIOPSY PAROTID FINE NEEDLE ASPIRATION Right 2022    [place] Tumor     ZZC ANESTH,BLEPHAROPLASTY       Right total hip arthroplasty  Left total knee arthroplasty    SOCIAL HISTORY:  Social History     Socioeconomic History     Marital status:      Spouse name: Not on file     Number of children: Not on file     Years of education: Not on file     Highest education level: Not on file   Occupational History     Not on file   Tobacco Use     Smoking status: Never     Smokeless tobacco: Never   Substance and Sexual Activity     Alcohol use: No     Drug use: No     Sexual activity: Not Currently     Partners: Male   Other Topics Concern     Parent/sibling w/ CABG, MI or angioplasty before 65F 55M? Not Asked   Social History Narrative     Not on file     Social Determinants of Health     Financial Resource Strain: Not on file   Food Insecurity: Not on file   Transportation Needs: Not on file   Physical Activity: Not on file   Stress: Not on file   Social Connections: Not on file   Interpersonal Safety: Not on file   Housing Stability: Not on file         FAMILY HISTORY:  Family History   Problem Relation Age of Onset     Cancer Mother 40        Uterine, diagnosed late \"40s\",  at age 83     Respiratory Mother         Emphysema     Cancer Father 50        prostate with bone mets,  age 91     Cancer Brother 80        prostate, radiation therapy, living     Cancer Maternal Grandmother         Cancer of the gallbladder     Heart Disease Maternal Grandfather      Cancer Paternal Grandmother         Breast, age unknown     Heart Disease Paternal Grandfather      Breast Cancer Paternal Aunt      Patient denies history of blood clots for herself, states that her family history of blood clots is related to her sister who was admitted for a hysterectomy followed by complications of peritonitis and \"blood clots throughout her whole body\" which was eventually fatal.      PHYSICAL EXAM:  Vital signs:  BP " 122/70   Pulse 60   Temp 98.2  F (36.8  C)   Resp 18   SpO2 99%        GENERAL/CONSTITUTIONAL: No acute distress.  EYES: Pupils are equal and round. Extraocular movements intact without nystagmus.  No scleral icterus.  RESPIRATORY: Equal chest rise.   MUSCULOSKELETAL: Warm and well-perfused, no cyanosis, clubbing, or edema.   NEUROLOGIC: Cranial nerves are grossly intact. Alert, oriented to person, place and time, answers questions appropriately.  INTEGUMENTARY: No rashes or jaundice.  GAIT: Steady, does not use assistive device  BREAST: Right-no palpable mass, discharge, rash, or axillary lymphadenopathy.  Left-no palpable mass, discharge, rash, or axillary lymphadenopathy. Scars well healed        LABS:   Latest Reference Range & Units 01/18/24 13:12   Sodium 135 - 145 mmol/L 141   Potassium 3.4 - 5.3 mmol/L 4.2   Chloride 98 - 107 mmol/L 103   Carbon Dioxide (CO2) 22 - 29 mmol/L 27   Urea Nitrogen 8.0 - 23.0 mg/dL 13.6   Creatinine 0.51 - 0.95 mg/dL 0.78   GFR Estimate >60 mL/min/1.73m2 74   Calcium 8.8 - 10.2 mg/dL 9.5   Anion Gap 7 - 15 mmol/L 11   Albumin 3.5 - 5.2 g/dL 4.7   Protein Total 6.4 - 8.3 g/dL 7.6   Alkaline Phosphatase 40 - 150 U/L 52   ALT 0 - 50 U/L 19   AST 0 - 45 U/L 26   Bilirubin Total <=1.2 mg/dL 0.6   Glucose 70 - 99 mg/dL 130 (H)   WBC 4.0 - 11.0 10e3/uL 9.2   Hemoglobin 11.7 - 15.7 g/dL 13.6   Hematocrit 35.0 - 47.0 % 40.0   Platelet Count 150 - 450 10e3/uL 201   RBC Count 3.80 - 5.20 10e6/uL 4.13   MCV 78 - 100 fL 97   MCH 26.5 - 33.0 pg 32.9   MCHC 31.5 - 36.5 g/dL 34.0   RDW 10.0 - 15.0 % 12.8   % Neutrophils % 61   % Lymphocytes % 25   % Monocytes % 11   % Eosinophils % 2   % Basophils % 1   Absolute Basophils 0.0 - 0.2 10e3/uL 0.1   Absolute Eosinophils 0.0 - 0.7 10e3/uL 0.2   Absolute Immature Granulocytes <=0.4 10e3/uL 0.0   Absolute Lymphocytes 0.8 - 5.3 10e3/uL 2.3   Absolute Monocytes 0.0 - 1.3 10e3/uL 1.0   % Immature Granulocytes % 0   Absolute Neutrophils 1.6 - 8.3  10e3/uL 5.7   Absolute NRBCs 10e3/uL 0.0   NRBCs per 100 WBC <1 /100 0   (H): Data is abnormally high    PATHOLOGY:      IMAGING:      ASSESSMENT/PLAN:  Leida Goddard is a 83 year old  female with:      # pT1c Nx RIGHT breast IDC, ER positive, [place] positive, her2 negative  - 8/22- ultrasound-guided biopsy of right breast lesion with clip placement- Pathology is not available at this time for review except as noted in lumpectomy: ER positive (%), [place] positive (80-90%), HER2 0 on IHC  - 9/22 right breast lumpectomy at [facility]: IDC, 1.7cm single focus, grade 3, no DCIS, no LVI, margins negative, no LN submitted/found, AJCC 8th edition stage 1A pT1c pNx  - 10/22 radiation oncology consultation with Dr. Michelle: Based on CALGB 9343 ([employer] 2013) trial, will omit radiation therapy  - 11/22 Oncotype DX breast recurrence score: 18  Given patient is clinically node-negative based on ultrasound, pathologically Nx, the following information available:  A) node-negative: Distant recurrence risk at 9 years with endocrine therapy alone 5%, group average absolute chemotherapy benefit in patients over 50 years old less than 1%  B) micrometastases and node positive (1-3): Distant recurrence at 9 years with endocrine therapy alone 16%, chemotherapy benefit for this group cannot be excluded (SEER registry outcomes in patients treated without chemotherapy with recurrence score 16-20= 96.7%)  C) node positive (>/= 4), distant recurrence at 9 years with endocrine therapy alone: 50%  - The above Oncotype results were discussed, including the fact that we have no suspicious lymph nodes clinically based on ultrasound imaging and physical exam but no pathological proof of node-negative disease.  Patient is aware that there may be a possibility of at least micrometastatic disease within the lymph nodes. Risks, benefits of chemotherapy discussed. Given the above information, patient does not wish to proceed with any form of  chemotherapy.  - 12/22- 6/23 tamoxifen, discontinued due to fatigue, personality changes, depression, arthralgias   - 6/16/23 letrozole     - 6/23 CBC, CMP WNL  - 1/24 labs CBC WNL, CMP WNL    - 7/23 b/l mammogram: Posttreatment changes on both breasts, BIRADS 2    PLAN:  - continue letrozole, refilled today  - plan for at least 5 years of tx (order BCI prior to discontinue)  - continue lexapro 10mg  - repeat CBC, CMP, fasting lipid panel, hgbA1c 7/24- ordered today   - mammogram due 7/24- ordered today     # pT1a (M) Nx LEFT breast cancer multifocal microscopic IDC, ER positive, [place] positive, HER2 negative on FISH  - dx in 2012  - s/p lumpectomy, RT, anastrazole 1mg from at least 1/8/14 to 1/19/18  - Patient has had bilateral breast cancer, pt defers genetic counseling on multiple occasions     #Osteoporosis  - 7/22 DEXA: osteoporosis (T score -3 left forearm)  - diagnosis 9/2017, Fosamax held due to myalgias, thinks she got IV medication for osteoporosis in 7/22 at [facility]   - given Reclast 5 mg on 8/8/2022 with Dr. Samantha Grissom and plan for annual treatment  - 7/23 changed bisphosphonate to q6 months    PLAN:  - last zometa 7/14/23, next zometa due 1/18/24 and then q6 months  - continue vitamin D, calcium  - next DEXA due 7/2024- ordered today      # forearm rash  - b/l forearms  - not responsive to topical steroids and benadryl cream  - Dermatology referral    RTC 6 months for f/u with me, fasting labs prior to appt, zometa (after mammogram and DEXA)    Rhonda Germain DO  Hematology/Oncology  [facility]      Again, thank you for allowing me to participate in the care of your patient.        Sincerely,        RHONDA GERMAIN DO

## 2024-01-18 NOTE — PROGRESS NOTES
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  Patient is s/p hysterectomy and BSO.     Patient's previous work-up was done at [facility].  I have reviewed the records in care everywhere and they are summarized below.     Regarding LEFT breast cancer 2012:   - 2012-screening mammogram showed grouped calcifications in the left breast at 7:00 posterior depth, increased in number, confirmed on ultrasound  - 2012-stereotactic biopsy with clip placement, pathology showed LCIS with fibroadenomatoid changes with microcalcifications  - 2013- left breast lumpectomy with pathology showin microscopic foci of invasive lobular carcinoma, largest focus 0.11 cm others 0.1 cm, 0.08 cm, 0.05 cm, overall grade 2, margins negative, multiple scattered foci of LCIS within the specimen, no DCIS, no LVI, no lymph nodes submitted.  %, WI 80%, HER2 2+ on IHC, FISH negative.  Pathological staging AJCC seventh edition pT1a (M) NX  - I cannot find specific detailed records of this however notes state that patient received postlumpectomy radiation  - On anastrozole 1 mg daily, pt reports she took  anastroazole for 5 yerars  - Patient followed with medical oncologist Dr. Heydi Queen     Regarding recently diagnosed RIGHT breast cancer:  -6/22- pt felt right breast shooting pains radiating to nipple, exacerbated by crocheting     -7/22 bilateral screening mammogram- heterogeneously dense breast, postsurgical changes in the left breast, scattered calcifications in right breast.  1.5 cm asymmetry, in line with the nipple, deep within the right breast, needs further evaluation     - 8/22- right mammogram: Spiculated mass measuring 18 mm in greatest dimension with surrounding architectural distortion, at 9 o'clock position posterior depth, scattered benign calcifications     -8/22- right breast ultrasound: Spiculated hypoechoic mass at the 9 o'clock position posterior depth measuring 1.5 cm in greatest dimension, right axilla no abnormal lymph nodes, BI-RADS 5     - 8/22- ultrasound-guided biopsy of right breast lesion with clip placement- 5x14 gauge core biopsies obtained  --- Pathology is not available at this time for review     -9/16/2022-right breast lumpectomy with second excision for involved margins with pathology showing:  A.  Right breast tissue, radioactive seed guided lumpectomy-Invasive ductal carcinoma, involving inferior and medial margins.  B.  Right breast inferior medial margin, excision-Invasive ductal carcinoma. New inferior and medial margins are free.  ---Invasive ductal carcinoma, grade 3, 1.7 cm, no LVI, dermal lymphovascular invasion N/A, all margins negative for invasive carcinoma (0.55 cm to new inferior margin on specimen B), regional lymph node status N/A (no lymph nodes submitted or found)  ---ER 90 to 100%  --- NE 80 to 90%  --- HER2 zero on IHC  --- Pathologic stage classification (AJCC eighth edition) pT1c NX     Microscopic Description      A.  Sections demonstrate breast parenchyma with tumor mass composed of sheets and cords of markedly pleomorphic cells with scattered mitotic  figures, including locally increased mitotic activity (T3 N3 M2).  The inferior and medial margins are involved by tumor.  There is prominent perineural invasion.  B.  Sections demonstrate breast parenchyma with invasive ductal carcinoma similar to that in specimen A.  The closest margin is inferior and measures 0.55 cm.     -11/22 Oncotype DX breast recurrence score: 18  Given patient is clinically node-negative based on ultrasound, pathologically Nx, the following information available:  A) node-negative: Distant recurrence risk at 9 years with endocrine therapy alone 5%, group average absolute chemotherapy benefit in patients over 50 years old less than 1%  B) micrometastases and node positive (1-3): Distant recurrence at 9 years with endocrine therapy alone 16%, chemotherapy benefit for this group cannot be excluded (see registry outcomes in patients treated without chemotherapy with recurrence score 16-20= 96.7%, versus recurrence score 0-10= 98.2%)  C) node positive (>/= 4), distant recurrence at 9 years with endocrine therapy alone: 50%    -10/22 radiation oncology consultation with Dr. Michelle: Based on CALGB 9343 ([place], [facility] 2013) trial, will omit radiation therapy    - 12/22-6/23 tamoxifen   - 6/23 started letrozole     INTERIM HISTORY:  Pt doing great on letrozole     7/23 pruritic rash on right arm x3 weeks, in sun exposed areas, no improvement w/topical steroids, rxed prednisone 40mg w/taper.    Pt continues to have intermittent erythematous, pruritic rash on b/l forearms. She saw dermatologist, used topical steroids BID x4 weeks and then at bedtime x4 weeks and has improvement but returned. No changes in soaps, detergents. Pt thinks it is associated with knitting     REVIEW OF SYSTEMS:   A 14 point ROS was reviewed with pertinent positives and negatives in the HPI.       HOME MEDICATIONS:  Current Outpatient Medications   Medication Sig Dispense Refill    calcium citrate-vitamin D (CITRACAL) 315-200  MG-UNIT TABS per tablet Take 1 tablet by mouth daily      cholecalciferol (VITAMIN D3) 10 mcg (400 units) TABS tablet Take 10 mcg by mouth daily      escitalopram (LEXAPRO) 10 MG tablet Take 1 tablet (10 mg) by mouth daily (Patient taking differently: Take 5 mg by mouth daily) 90 tablet 3    furosemide (LASIX) 40 MG tablet Take 20 mg by mouth daily       letrozole (FEMARA) 2.5 MG tablet Take 1 tablet (2.5 mg) by mouth daily 90 tablet 3    LEVOTHYROXINE SODIUM 25 MCG OR TABS 1 TABLET DAILY      losartan (COZAAR) 100 MG tablet Take 100 mg by mouth daily.      magnesium 250 MG tablet Take 1 tablet by mouth 2 times daily      omeprazole (PRILOSEC) 20 MG DR capsule Take 20 mg by mouth daily      potassium chloride ER (K-DUR/KLOR-CON M) 20 MEQ CR tablet Take 20 mEq by mouth 2 times daily      potassium chloride SA (K-DUR,KLOR-CON M) 20 MEQ tablet Take by mouth daily      PROPRANOLOL HCL PO Take 40 mg by mouth 2 times daily.      vitamin D2 (ERGOCALCIFEROL) 80305 units (1250 mcg) capsule Take 50,000 Units by mouth once a week      albuterol (PROAIR HFA/PROVENTIL HFA/VENTOLIN HFA) 108 (90 Base) MCG/ACT inhaler Inhale 2 puffs into the lungs (Patient not taking: Reported on 1/18/2024)      fluticasone (FLONASE) 50 MCG/ACT nasal spray Spray 1 spray into both nostrils 2 times daily (Patient not taking: Reported on 1/18/2024)      mirtazapine (REMERON) 7.5 MG tablet Take 7.5 mg by mouth At Bedtime (Patient not taking: Reported on 1/18/2024)      tamoxifen (NOLVADEX) 20 MG tablet Take 1 tablet (20 mg) by mouth daily (Patient not taking: Reported on 1/18/2024) 30 tablet 6    triamcinolone (KENALOG) 0.1 % external cream Apply twice daily as needed to the back, up to two weeks on at one time. (Patient not taking: Reported on 1/18/2024) 80 g 11         ALLERGIES:  Allergies   Allergen Reactions    Metformin Nausea and Vomiting    Adhesive Tape Hives    Amoxicillin-Pot Clavulanate Diarrhea    Doxycycline Nausea and Vomiting    Food       Lactose Intolerant    Hctz Rash    Lisinopril Cough         PAST MEDICAL HISTORY:  Past Medical History:   Diagnosis Date    Breast cancer (H) 12/07/2012    Left breast    Breast cancer (H) 08/11/2022    Right Breast    Essential hypertension, benign     Hypertension, Benign    DALIA (obstructive sleep apnea)     S/P radiation therapy     6,040 cGy to left breast completed on 4/9/2013 [facility]    Type II or unspecified type diabetes mellitus without mention of complication, not stated as uncontrolled     Diabetes mellitus   LEFT pT1a (M) NX multifocal microscopic invasive lobular carcinoma, ER positive, KY positive, HER2 negative on FISH s/p lumpectomy, RT, anastrazole (2012), osteoporosis on 7/22 DEXA (diagnosis 9/2017, Fosamax held due to myalgias, ?IV bisphophonate therapy at [facility]), 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      PAST SURGICAL HISTORY:  Past Surgical History:   Procedure Laterality Date    BREAST BIOPSY, CORE RT/LT Right 08/11/2022    CHOLECYSTECTOMY  01/01/1966    HYSTERECTOMY TOTAL ABDOMINAL, BILATERAL SALPINGO-OOPHORECTOMY, COMBINED      age 43    LUMPECTOMY BREAST  01/07/2013    Left breast    LUMPECTOMY BREAST Right 09/16/2022    Dr. Joseph    REPAIR ENTROPION Left 04/19/2021    Procedure: REPAIR, ENTROPION;  Surgeon: Susy Marie MD;  Location: [facility] OR    SPINE SURGERY      Lumbar laminectomy    US BIOPSY PAROTID FINE NEEDLE ASPIRATION Right 02/08/2022    [place] Tumor    ZZC ANESTH,BLEPHAROPLASTY       Right total hip arthroplasty  Left total knee arthroplasty    SOCIAL HISTORY:  Social History     Socioeconomic History    Marital status:      Spouse name: Not on file    Number of children: Not  "on file    Years of education: Not on file    Highest education level: Not on file   Occupational History    Not on file   Tobacco Use    Smoking status: Never    Smokeless tobacco: Never   Substance and Sexual Activity    Alcohol use: No    Drug use: No    Sexual activity: Not Currently     Partners: Male   Other Topics Concern    Parent/sibling w/ CABG, MI or angioplasty before 65F 55M? Not Asked   Social History Narrative    Not on file     Social Determinants of Health     Financial Resource Strain: Not on file   Food Insecurity: Not on file   Transportation Needs: Not on file   Physical Activity: Not on file   Stress: Not on file   Social Connections: Not on file   Interpersonal Safety: Not on file   Housing Stability: Not on file         FAMILY HISTORY:  Family History   Problem Relation Age of Onset    Cancer Mother 40        Uterine, diagnosed late \"40s\",  at age 83    Respiratory Mother         Emphysema    Cancer Father 50        prostate with bone mets,  age 91    Cancer Brother 80        prostate, radiation therapy, living    Cancer Maternal Grandmother         Cancer of the gallbladder    Heart Disease Maternal Grandfather     Cancer Paternal Grandmother         Breast, age unknown    Heart Disease Paternal Grandfather     Breast Cancer Paternal Aunt      Patient denies history of blood clots for herself, states that her family history of blood clots is related to her sister who was admitted for a hysterectomy followed by complications of peritonitis and \"blood clots throughout her whole body\" which was eventually fatal.      PHYSICAL EXAM:  Vital signs:  /70   Pulse 60   Temp 98.2  F (36.8  C)   Resp 18   SpO2 99%        GENERAL/CONSTITUTIONAL: No acute distress.  EYES: Pupils are equal and round. Extraocular movements intact without nystagmus.  No scleral icterus.  RESPIRATORY: Equal chest rise.   MUSCULOSKELETAL: Warm and well-perfused, no cyanosis, clubbing, or edema. "   NEUROLOGIC: Cranial nerves are grossly intact. Alert, oriented to person, place and time, answers questions appropriately.  INTEGUMENTARY: No rashes or jaundice.  GAIT: Steady, does not use assistive device  BREAST: Right-no palpable mass, discharge, rash, or axillary lymphadenopathy.  Left-no palpable mass, discharge, rash, or axillary lymphadenopathy. Scars well healed        LABS:   Latest Reference Range & Units 01/18/24 13:12   Sodium 135 - 145 mmol/L 141   Potassium 3.4 - 5.3 mmol/L 4.2   Chloride 98 - 107 mmol/L 103   Carbon Dioxide (CO2) 22 - 29 mmol/L 27   Urea Nitrogen 8.0 - 23.0 mg/dL 13.6   Creatinine 0.51 - 0.95 mg/dL 0.78   GFR Estimate >60 mL/min/1.73m2 74   Calcium 8.8 - 10.2 mg/dL 9.5   Anion Gap 7 - 15 mmol/L 11   Albumin 3.5 - 5.2 g/dL 4.7   Protein Total 6.4 - 8.3 g/dL 7.6   Alkaline Phosphatase 40 - 150 U/L 52   ALT 0 - 50 U/L 19   AST 0 - 45 U/L 26   Bilirubin Total <=1.2 mg/dL 0.6   Glucose 70 - 99 mg/dL 130 (H)   WBC 4.0 - 11.0 10e3/uL 9.2   Hemoglobin 11.7 - 15.7 g/dL 13.6   Hematocrit 35.0 - 47.0 % 40.0   Platelet Count 150 - 450 10e3/uL 201   RBC Count 3.80 - 5.20 10e6/uL 4.13   MCV 78 - 100 fL 97   MCH 26.5 - 33.0 pg 32.9   MCHC 31.5 - 36.5 g/dL 34.0   RDW 10.0 - 15.0 % 12.8   % Neutrophils % 61   % Lymphocytes % 25   % Monocytes % 11   % Eosinophils % 2   % Basophils % 1   Absolute Basophils 0.0 - 0.2 10e3/uL 0.1   Absolute Eosinophils 0.0 - 0.7 10e3/uL 0.2   Absolute Immature Granulocytes <=0.4 10e3/uL 0.0   Absolute Lymphocytes 0.8 - 5.3 10e3/uL 2.3   Absolute Monocytes 0.0 - 1.3 10e3/uL 1.0   % Immature Granulocytes % 0   Absolute Neutrophils 1.6 - 8.3 10e3/uL 5.7   Absolute NRBCs 10e3/uL 0.0   NRBCs per 100 WBC <1 /100 0   (H): Data is abnormally high    PATHOLOGY:      IMAGING:      ASSESSMENT/PLAN:  Leida Goddard is a 83 year old  female with:      # pT1c Nx RIGHT breast IDC, ER positive, NV positive, her2 negative  - 8/22- ultrasound-guided biopsy of right breast lesion  with clip placement- Pathology is not available at this time for review except as noted in lumpectomy: ER positive (%), RI positive (80-90%), HER2 0 on IHC  - 9/22 right breast lumpectomy at [facility]: IDC, 1.7cm single focus, grade 3, no DCIS, no LVI, margins negative, no LN submitted/found, AJCC 8th edition stage 1A pT1c pNx  - 10/22 radiation oncology consultation with Dr. Michelle: Based on CALGB 9343 ([employer] 2013) trial, will omit radiation therapy  - 11/22 Oncotype DX breast recurrence score: 18  Given patient is clinically node-negative based on ultrasound, pathologically Nx, the following information available:  A) node-negative: Distant recurrence risk at 9 years with endocrine therapy alone 5%, group average absolute chemotherapy benefit in patients over 50 years old less than 1%  B) micrometastases and node positive (1-3): Distant recurrence at 9 years with endocrine therapy alone 16%, chemotherapy benefit for this group cannot be excluded ([facility] registry outcomes in patients treated without chemotherapy with recurrence score 16-20= 96.7%)  C) node positive (>/= 4), distant recurrence at 9 years with endocrine therapy alone: 50%  - The above Oncotype results were discussed, including the fact that we have no suspicious lymph nodes clinically based on ultrasound imaging and physical exam but no pathological proof of node-negative disease.  Patient is aware that there may be a possibility of at least micrometastatic disease within the lymph nodes. Risks, benefits of chemotherapy discussed. Given the above information, patient does not wish to proceed with any form of chemotherapy.  - 12/22- 6/23 tamoxifen, discontinued due to fatigue, personality changes, depression, arthralgias   - 6/16/23 letrozole     - 6/23 CBC, CMP WNL  - 1/24 labs CBC WNL, CMP WNL    - 7/23 b/l mammogram: Posttreatment changes on both breasts, BIRADS 2    PLAN:  - continue letrozole, refilled today  - plan for at least 5  years of tx (order BCI prior to discontinue)  - continue lexapro 10mg  - repeat CBC, CMP, fasting lipid panel, hgbA1c 7/24- ordered today   - mammogram due 7/24- ordered today     # pT1a (M) Nx LEFT breast cancer multifocal microscopic IDC, ER positive, HI positive, HER2 negative on FISH  - dx in 2012  - s/p lumpectomy, RT, anastrazole 1mg from at least 1/8/14 to 1/19/18  - Patient has had bilateral breast cancer, pt defers genetic counseling on multiple occasions     #Osteoporosis  - 7/22 DEXA: osteoporosis (T score -3 left forearm)  - diagnosis 9/2017, Fosamax held due to myalgias, thinks she got IV medication for osteoporosis in 7/22 at [facility]   - given Reclast 5 mg on 8/8/2022 with Dr. Samantha Grissom and plan for annual treatment  - 7/23 changed bisphosphonate to q6 months    PLAN:  - last zometa 7/14/23, next zometa due 1/18/24 and then q6 months  - continue vitamin D, calcium  - next DEXA due 7/2024- ordered today      # forearm rash  - b/l forearms  - not responsive to topical steroids and benadryl cream  - Dermatology referral    RTC 6 months for f/u with me, fasting labs prior to appt, zometa (after mammogram and DEXA)    Tracy Rangel DO  Hematology/Oncology  [facility]

## 2024-01-18 NOTE — PROGRESS NOTES
Infusion Nursing Note:  Leida Goddard presents today for Zometa.    Patient seen by provider today: Yes: Dr. Rangel   present during visit today: Not Applicable.    Note: The patient reports feeling well today and denies any concerns at this time.    Patient denies dental issues at this time.  Patient will inform dentist that she got Zometa infusion, prior to any invasive dental work in the future.    Reminded patient to drink 6-8 glasses of water today, and tomorrow, to protect kidneys.     Intravenous Access:  Peripheral IV placed.    Treatment Conditions:  Lab Results   Component Value Date     01/18/2024    POTASSIUM 4.2 01/18/2024    CR 0.78 01/18/2024    JAZMYNE 9.5 01/18/2024    BILITOTAL 0.6 01/18/2024    ALBUMIN 4.7 01/18/2024    ALT 19 01/18/2024    AST 26 01/18/2024       Results reviewed, labs MET treatment parameters, ok to proceed with treatment.      Post Infusion Assessment:  Patient tolerated infusion without incident.  Site patent and intact, free from redness, edema or discomfort.  No evidence of extravasations.  Access discontinued per protocol.       Discharge Plan:   AVS to patient via MYCHART.  Patient will return as directed by her provider for her next appointment.  Patient discharged in stable condition accompanied by: self.  Departure Mode: Ambulatory.      Beryl De Los Santos RN

## 2024-01-18 NOTE — NURSING NOTE
"Oncology Rooming Note    January 18, 2024 1:40 PM   Leida Goddard is a 83 year old female who presents for:    Chief Complaint   Patient presents with    Oncology Clinic Visit     Follow Up     Initial Vitals: /70   Pulse 60   Temp 98.2  F (36.8  C)   Resp 18   SpO2 99%  Estimated body mass index is 40.51 kg/m  as calculated from the following:    Height as of 4/13/21: 1.676 m (5' 6\").    Weight as of 7/14/23: 113.9 kg (251 lb). There is no height or weight on file to calculate BSA.  No Pain (0) Comment: Data Unavailable   No LMP recorded. Patient has had a hysterectomy.  Allergies reviewed: Yes  Medications reviewed: Yes    Medications: Medication refills not needed today.  Pharmacy name entered into [employer]: [id number] - [place] - [address] AT [address]    Frailty Screening:   Is the patient here for a new oncology consult visit in cancer care? 2. No      Clinical concerns: No Concerns        Sinan Wilkes MA            "

## 2024-01-19 ENCOUNTER — DOCUMENTATION ONLY (OUTPATIENT)
Dept: ONCOLOGY | Facility: CLINIC | Age: 84
End: 2024-01-19
Payer: COMMERCIAL

## 2024-01-19 NOTE — NURSING NOTE
DEXA scan orders faxed to [facility] at [phone] per patient request.    Judith Diamond, RN, BSN  RN Care Coordinator - Oncology/Hematology  [facility]

## 2024-03-06 DIAGNOSIS — C50.911 INVASIVE DUCTAL CARCINOMA OF BREAST, FEMALE, RIGHT (H): ICD-10-CM

## 2024-03-06 NOTE — TELEPHONE ENCOUNTER
SUBJECTIVE/OBJECTIVE:                                                    Refill request receive for:  Escitalopram    Last refill per fax: 12/8/23  Date of LOV r/t Medication: 1/18/24  Future appt scheduled? Yes: 7/18/24   Date faxed to clinic: 3/5/24    PLAN:                                                      Sent to provider to advise.

## 2024-03-08 RX ORDER — ESCITALOPRAM OXALATE 10 MG/1
10 TABLET ORAL DAILY
Qty: 90 TABLET | Refills: 3 | Status: SHIPPED | OUTPATIENT
Start: 2024-03-08

## 2024-07-16 DIAGNOSIS — M81.0 AGE-RELATED OSTEOPOROSIS WITHOUT CURRENT PATHOLOGICAL FRACTURE: Primary | ICD-10-CM

## 2024-07-16 RX ORDER — HEPARIN SODIUM,PORCINE 10 UNIT/ML
5-20 VIAL (ML) INTRAVENOUS DAILY PRN
Status: CANCELLED | OUTPATIENT
Start: 2024-07-19

## 2024-07-16 RX ORDER — HEPARIN SODIUM (PORCINE) LOCK FLUSH IV SOLN 100 UNIT/ML 100 UNIT/ML
5 SOLUTION INTRAVENOUS
Status: CANCELLED | OUTPATIENT
Start: 2024-07-19

## 2024-07-16 RX ORDER — ZOLEDRONIC ACID 0.04 MG/ML
4 INJECTION, SOLUTION INTRAVENOUS ONCE
Status: CANCELLED | OUTPATIENT
Start: 2024-07-19 | End: 2024-07-19

## 2024-07-18 ENCOUNTER — PATIENT OUTREACH (OUTPATIENT)
Dept: ONCOLOGY | Facility: CLINIC | Age: 84
End: 2024-07-18

## 2024-07-18 NOTE — PROGRESS NOTES
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  Patient is s/p hysterectomy and BSO.     Patient's previous work-up was done at [facility].  I have reviewed the records in care everywhere and they are summarized below.     Regarding LEFT breast cancer 2012:   - 2012-screening mammogram showed grouped calcifications in the left breast at 7:00 posterior depth, increased in number, confirmed on ultrasound  - 2012-stereotactic biopsy with clip placement, pathology showed LCIS with fibroadenomatoid changes with microcalcifications  - 2013- left breast lumpectomy with pathology showin microscopic foci of invasive lobular carcinoma, largest focus 0.11 cm others 0.1 cm, 0.08 cm, 0.05 cm, overall grade 2, margins negative, multiple scattered foci of LCIS within the specimen, no DCIS, no LVI, no lymph nodes submitted.  %, NC 80%, HER2 2+ on IHC, FISH negative.  Pathological staging AJCC seventh edition pT1a (M) NX  - I cannot find specific detailed records of this however notes state that patient received postlumpectomy radiation  - On anastrozole 1 mg daily, pt reports she took  anastroazole for 5 yerars  - Patient followed with medical oncologist Dr. Heydi Queen     Regarding recently diagnosed RIGHT breast cancer:  -6/22- pt felt right breast shooting pains radiating to nipple, exacerbated by crocheting     -7/22 bilateral screening mammogram- heterogeneously dense breast, postsurgical changes in the left breast, scattered calcifications in right breast.  1.5 cm asymmetry, in line with the nipple, deep within the right breast, needs further evaluation     - 8/22- right mammogram: Spiculated mass measuring 18 mm in greatest dimension with surrounding architectural distortion, at 9 o'clock position posterior depth, scattered benign calcifications     -8/22- right breast ultrasound: Spiculated hypoechoic mass at the 9 o'clock position posterior depth measuring 1.5 cm in greatest dimension, right axilla no abnormal lymph nodes, BI-RADS 5     - 8/22- ultrasound-guided biopsy of right breast lesion with clip placement- 5x14 gauge core biopsies obtained  --- Pathology is not available at this time for review     -9/16/2022-right breast lumpectomy with second excision for involved margins with pathology showing:  A.  Right breast tissue, radioactive seed guided lumpectomy-Invasive ductal carcinoma, involving inferior and medial margins.  B.  Right breast inferior medial margin, excision-Invasive ductal carcinoma. New inferior and medial margins are free.  ---Invasive ductal carcinoma, grade 3, 1.7 cm, no LVI, dermal lymphovascular invasion N/A, all margins negative for invasive carcinoma (0.55 cm to new inferior margin on specimen B), regional lymph node status N/A (no lymph nodes submitted or found)  ---ER 90 to 100%  --- KS 80 to 90%  --- HER2 zero on IHC  --- Pathologic stage classification (AJCC eighth edition) pT1c NX     Microscopic Description      A.  Sections demonstrate breast parenchyma with tumor mass composed of sheets and cords of markedly pleomorphic cells with scattered mitotic  figures, including locally increased mitotic activity (T3 N3 M2).  The inferior and medial margins are involved by tumor.  There is prominent perineural invasion.  B.  Sections demonstrate breast parenchyma with invasive ductal carcinoma similar to that in specimen A.  The closest margin is inferior and measures 0.55 cm.     -11/22 Oncotype DX breast recurrence score: 18  Given patient is clinically node-negative based on ultrasound, pathologically Nx, the following information available:  A) node-negative: Distant recurrence risk at 9 years with endocrine therapy alone 5%, group average absolute chemotherapy benefit in patients over 50 years old less than 1%  B) micrometastases and node positive (1-3): Distant recurrence at 9 years with endocrine therapy alone 16%, chemotherapy benefit for this group cannot be excluded (see registry outcomes in patients treated without chemotherapy with recurrence score 16-20= 96.7%, versus recurrence score 0-10= 98.2%)  C) node positive (>/= 4), distant recurrence at 9 years with endocrine therapy alone: 50%    -10/22 radiation oncology consultation with Dr. Michelle: Based on CALGB 9343 ([place], LEOO 2013) trial, will omit radiation therapy    - 12/22-6/23 tamoxifen   - 6/23 started letrozole     INTERIM HISTORY:  Pt doing great on letrozole, reports no AE  Pt is having hard time going to sleep at night and staying asleep, so ends up taking nap during day. Her PCP started her on trazodone, she uses 1/2 to 1/4 tablet, she does not like how it makes her feel (foggy, irritable, no SI especially if she takes it two nights in a row). She does not have screen time 2 hrs before sleeping. Normally does not have caffeine. Has taken melatonin 10mg and tylenol PM w/o relief and with nightmares on tylenol PM.     7/23 pruritic rash on right arm x3 weeks, in sun exposed areas, no improvement w/topical steroids, rxed prednisone 40mg w/taper initially. Is currently using triamcinolone 0.1% and prn  antihistamine.         REVIEW OF SYSTEMS:   A 14 point ROS was reviewed with pertinent positives and negatives in the HPI.       HOME MEDICATIONS:  Current Outpatient Medications   Medication Sig Dispense Refill    albuterol (PROAIR HFA/PROVENTIL HFA/VENTOLIN HFA) 108 (90 Base) MCG/ACT inhaler Inhale 2 puffs into the lungs      calcium citrate-vitamin D (CITRACAL) 315-200 MG-UNIT TABS per tablet Take 1 tablet by mouth daily      cholecalciferol (VITAMIN D3) 10 mcg (400 units) TABS tablet Take 10 mcg by mouth daily      escitalopram (LEXAPRO) 10 MG tablet Take 1 tablet (10 mg) by mouth daily 90 tablet 3    furosemide (LASIX) 40 MG tablet Take 20 mg by mouth daily       letrozole (FEMARA) 2.5 MG tablet Take 1 tablet (2.5 mg) by mouth daily 90 tablet 3    LEVOTHYROXINE SODIUM 25 MCG OR TABS 1 TABLET DAILY      losartan (COZAAR) 100 MG tablet Take 100 mg by mouth daily.      magnesium 250 MG tablet Take 1 tablet by mouth 2 times daily      omeprazole (PRILOSEC) 20 MG DR capsule Take 20 mg by mouth daily      potassium chloride ER (K-DUR/KLOR-CON M) 20 MEQ CR tablet Take 20 mEq by mouth 2 times daily      potassium chloride SA (K-DUR,KLOR-CON M) 20 MEQ tablet Take by mouth daily      PROPRANOLOL HCL PO Take 40 mg by mouth 2 times daily.      triamcinolone (KENALOG) 0.1 % external cream Apply twice daily as needed to the back, up to two weeks on at one time. 80 g 11    vitamin D2 (ERGOCALCIFEROL) 33222 units (1250 mcg) capsule Take 50,000 Units by mouth once a week      fluticasone (FLONASE) 50 MCG/ACT nasal spray Spray 1 spray into both nostrils 2 times daily (Patient not taking: Reported on 1/18/2024)      mirtazapine (REMERON) 7.5 MG tablet Take 7.5 mg by mouth At Bedtime (Patient not taking: Reported on 1/18/2024)      tamoxifen (NOLVADEX) 20 MG tablet Take 1 tablet (20 mg) by mouth daily (Patient not taking: Reported on 1/18/2024) 30 tablet 6         ALLERGIES:  Allergies   Allergen Reactions    Metformin Nausea and  Vomiting    Adhesive Tape Hives    Amoxicillin-Pot Clavulanate Diarrhea    Doxycycline Nausea and Vomiting    Food      Lactose Intolerant    Hctz Rash    Lisinopril Cough         PAST MEDICAL HISTORY:  Past Medical History:   Diagnosis Date    Breast cancer (H) 12/07/2012    Left breast    Breast cancer (H) 08/11/2022    Right Breast    Essential hypertension, benign     Hypertension, Benign    DALIA (obstructive sleep apnea)     S/P radiation therapy     6,040 cGy to left breast completed on 4/9/2013 [facility]    Type II or unspecified type diabetes mellitus without mention of complication, not stated as uncontrolled     Diabetes mellitus   LEFT pT1a (M) NX multifocal microscopic invasive lobular carcinoma, ER positive, UT positive, HER2 negative on FISH s/p lumpectomy, RT, anastrazole (2012), osteoporosis on 7/22 DEXA (diagnosis 9/2017, Fosamax held due to myalgias, ?IV bisphophonate therapy at [facility]), 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      PAST SURGICAL HISTORY:  Past Surgical History:   Procedure Laterality Date    BREAST BIOPSY, CORE RT/LT Right 08/11/2022    CHOLECYSTECTOMY  01/01/1966    HYSTERECTOMY TOTAL ABDOMINAL, BILATERAL SALPINGO-OOPHORECTOMY, COMBINED      age 43    LUMPECTOMY BREAST  01/07/2013    Left breast    LUMPECTOMY BREAST Right 09/16/2022    Dr. Joseph    REPAIR ENTROPION Left 04/19/2021    Procedure: REPAIR, ENTROPION;  Surgeon: Susy Marie MD;  Location: [facility] OR    SPINE SURGERY      Lumbar laminectomy    US BIOPSY PAROTID FINE NEEDLE ASPIRATION Right 02/08/2022    [place] Tumor    ZZC ANESTH,BLEPHAROPLASTY       Right total hip arthroplasty  Left total knee arthroplasty    SOCIAL HISTORY:  Social  "History     Socioeconomic History    Marital status:      Spouse name: Not on file    Number of children: Not on file    Years of education: Not on file    Highest education level: Not on file   Occupational History    Not on file   Tobacco Use    Smoking status: Never    Smokeless tobacco: Never   Substance and Sexual Activity    Alcohol use: No    Drug use: No    Sexual activity: Not Currently     Partners: Male   Other Topics Concern    Parent/sibling w/ CABG, MI or angioplasty before 65F 55M? Not Asked   Social History Narrative    Not on file     Social Determinants of Health     Financial Resource Strain: Not on file   Food Insecurity: Not on file   Transportation Needs: Not on file   Physical Activity: Not on file   Stress: Not on file   Social Connections: Not on file   Interpersonal Safety: Not At Risk (2024)    [facility]     Humiliation, Afraid, Rape, and Kick questionnaire     Fear of Current or Ex-Partner: No     Emotionally Abused: No     Physically Abused: No     Sexually Abused: No   Housing Stability: Not on file         FAMILY HISTORY:  Family History   Problem Relation Age of Onset    Cancer Mother 40        Uterine, diagnosed late \"40s\",  at age 83    Respiratory Mother         Emphysema    Cancer Father 50        prostate with bone mets,  age 91    Cancer Brother 80        prostate, radiation therapy, living    Cancer Maternal Grandmother         Cancer of the gallbladder    Heart Disease Maternal Grandfather     Cancer Paternal Grandmother         Breast, age unknown    Heart Disease Paternal Grandfather     Breast Cancer Paternal Aunt      Patient denies history of blood clots for herself, states that her family history of blood clots is related to her sister who was admitted for a hysterectomy followed by complications of peritonitis and \"blood clots throughout her whole body\" which was eventually fatal.      PHYSICAL " EXAM:  Vital signs:  BP (!) 152/69 (BP Location: Right arm)   Pulse 65   Temp 98  F (36.7  C) (Oral)   Wt 111.2 kg (245 lb 3.2 oz)   SpO2 98%   BMI 39.58 kg/m         GENERAL/CONSTITUTIONAL: No acute distress.  EYES: Pupils are equal and round. Extraocular movements intact without nystagmus.  No scleral icterus.  RESPIRATORY: Equal chest rise.   MUSCULOSKELETAL: Warm and well-perfused, no cyanosis, clubbing, or edema.   NEUROLOGIC: Cranial nerves are grossly intact. Alert, oriented to person, place and time, answers questions appropriately.  INTEGUMENTARY: No rashes or jaundice.          LABS:   Latest Reference Range & Units 07/19/24 12:07 07/19/24 13:48 07/19/24 14:07   Sodium 135 - 145 mmol/L   141   Potassium 3.4 - 5.3 mmol/L   3.9   Chloride 98 - 107 mmol/L   106   Carbon Dioxide (CO2) 22 - 29 mmol/L   23   Urea Nitrogen 8.0 - 23.0 mg/dL   12.0   Creatinine 0.51 - 0.95 mg/dL   0.75   GFR Estimate >60 mL/min/1.73m2   77   Calcium 8.8 - 10.4 mg/dL   9.0   Anion Gap 7 - 15 mmol/L   12   Albumin 3.5 - 5.2 g/dL   4.3   Protein Total 6.4 - 8.3 g/dL   6.9   Alkaline Phosphatase 40 - 150 U/L   48   ALT 0 - 50 U/L   10   AST 0 - 45 U/L   23   Bilirubin Total <=1.2 mg/dL   0.7   Cholesterol <200 mg/dL   138   Patient Fasting?    Yes  Yes   Glucose 70 - 99 mg/dL   135 (H)   HDL Cholesterol >=50 mg/dL   42 (L)   LDL Cholesterol Calculated <=100 mg/dL   48   Non HDL Cholesterol <130 mg/dL   96   Triglycerides <150 mg/dL   238 (H)   WBC 4.0 - 11.0 10e3/uL 7.3     Hemoglobin 11.7 - 15.7 g/dL 12.3     Hematocrit 35.0 - 47.0 % 36.1     Platelet Count 150 - 450 10e3/uL 197     RBC Count 3.80 - 5.20 10e6/uL 3.79 (L)     MCV 78 - 100 fL 95     MCH 26.5 - 33.0 pg 32.5     MCHC 31.5 - 36.5 g/dL 34.1     RDW 10.0 - 15.0 % 13.1     % Neutrophils % 62     % Lymphocytes % 26     % Monocytes % 9     % Eosinophils % 2     % Basophils % 1     Absolute Basophils 0.0 - 0.2 10e3/uL 0.1     Absolute Eosinophils 0.0 - 0.7 10e3/uL 0.2      Absolute Immature Granulocytes <=0.4 10e3/uL 0.0     Absolute Lymphocytes 0.8 - 5.3 10e3/uL 1.9     Absolute Monocytes 0.0 - 1.3 10e3/uL 0.7     % Immature Granulocytes % 0     Absolute Neutrophils 1.6 - 8.3 10e3/uL 4.5     Absolute NRBCs 10e3/uL 0.0     NRBCs per 100 WBC <1 /100 0     MA SCREENING BILATERAL W/ CAN   Rpt    (H): Data is abnormally high  (L): Data is abnormally low  Rpt: View report in Results Review for more information    PATHOLOGY:      IMAGING:  Narrative & Impression   EXAMINATION:  MA SCREENING BILATERAL W/ CAN, 7/19/2024 1:48 PM     HISTORY/FAMILY HISTORY: No current or new breast symptoms, screening.   Right breast cancer stage 1 s/p lumpectomy, no RT or chemo, on  endocrine tx, surveillance; Invasive ductal carcinoma of breast,  female, right (H). Also history of treated left breast cancer in 2013.     COMPARISON: 7/12/2023, 8/4/2022, 7/19/2022     TECHNIQUE: Standard mammographic views were performed with  tomosynthesis and synthetic mammogram.  CAD was utilized during the  interpretation.     FINDINGS:  BREAST DENSITY: The breasts are heterogeneously dense which may  obscure small masses.     There are posttreatment changes in both breasts. There is no  significant change compared to prior.                                                                      IMPRESSION: BI-RADS CATEGORY: 2 - Benign.     RECOMMENDED FOLLOW-UP: Routine yearly mammography beginning at age 40  or as discussed with your provider.     The results of the examination will be sent to the patient.     DAMIR STARK MD        ASSESSMENT/PLAN:  Leida Goddard is a 84 year old  female with:      # pT1c Nx RIGHT breast IDC, ER positive, NV positive, her2 negative  - 8/22- ultrasound-guided biopsy of right breast lesion with clip placement- Pathology is not available at this time for review except as noted in lumpectomy: ER positive (%), NV positive (80-90%), HER2 0 on IHC  - 9/22 right breast lumpectomy  at [facility]: IDC, 1.7cm single focus, grade 3, no DCIS, no LVI, margins negative, no LN submitted/found, AJCC 8th edition stage 1A pT1c pNx  - 10/22 radiation oncology consultation with Dr. Michelle: Based on CALGB 9343 ([facility] 2013) trial, will omit radiation therapy  - 11/22 Oncotype DX breast recurrence score: 18  Given patient is clinically node-negative based on ultrasound, pathologically Nx, the following information available:  A) node-negative: Distant recurrence risk at 9 years with endocrine therapy alone 5%, group average absolute chemotherapy benefit in patients over 50 years old less than 1%  B) micrometastases and node positive (1-3): Distant recurrence at 9 years with endocrine therapy alone 16%, chemotherapy benefit for this group cannot be excluded (SEER registry outcomes in patients treated without chemotherapy with recurrence score 16-20= 96.7%)  C) node positive (>/= 4), distant recurrence at 9 years with endocrine therapy alone: 50%  - The above Oncotype results were discussed, including the fact that we have no suspicious lymph nodes clinically based on ultrasound imaging and physical exam but no pathological proof of node-negative disease.  Patient is aware that there may be a possibility of at least micrometastatic disease within the lymph nodes. Risks, benefits of chemotherapy discussed. Given the above information, patient does not wish to proceed with any form of chemotherapy.  - 12/22- 6/23 tamoxifen, discontinued due to fatigue, personality changes, depression, arthralgias   - 6/16/23 letrozole       - 7/24 CBC WBL, CMP WNL, fasting lipid panel , hgbA1c 6.6    - 7/24 b/l mammogram: Posttreatment changes on both breasts, BIRADS 2    PLAN:  - continue letrozole, refilled today  - plan for at least 5 years of tx   - continue lexapro 10mg  - repeat CBC, CMP 1/2025- ordered today  - repeat fasting lipid panel, hgbA1c 7/2025- ordered today  - mammogram due 7/2025- ordered  today    # pT1a (M) Nx LEFT breast cancer multifocal microscopic IDC, ER positive, PA positive, HER2 negative on FISH  - dx in 2012  - s/p lumpectomy, RT, anastrazole 1mg from at least 1/8/14 to 1/19/18  - Patient has had bilateral breast cancer, pt defers genetic counseling on multiple occasions     #Osteoporosis  - 7/22 DEXA: osteoporosis (T score -3 left forearm)  - diagnosis 9/2017, Fosamax held due to myalgias, thinks she got IV medication for osteoporosis in 7/22 at [facility]   - given Reclast 5 mg on 8/8/2022 with Dr. Samantha Grissom and plan for annual treatment  - 7/23 changed bisphosphonate to q6 months    PLAN:  - last zometa 1/18/24, next zometa due 7/19/24 and then q6 months  - continue vitamin D, calcium  - next DEXA due 7/22/2024 at [facility]    # forearm rash  - b/l forearms  - not responsive to topical steroids and benadryl cream  - follow up with dermatology    RTC 6 months for f/u with SUMEET, labs prior to appt, zometa   RTC 12 months for follow up with physician, fasting labs prior to appt, zometa (after mammo)    Tracy Rangel DO  Hematology/Oncology  [facility]

## 2024-07-18 NOTE — PROGRESS NOTES
Spoke with patient to remind her to fast for her labs on Friday, 7/19.  She is understanding of the plan.

## 2024-07-19 ENCOUNTER — ONCOLOGY VISIT (OUTPATIENT)
Dept: ONCOLOGY | Facility: CLINIC | Age: 84
End: 2024-07-19
Attending: INTERNAL MEDICINE
Payer: COMMERCIAL

## 2024-07-19 ENCOUNTER — INFUSION THERAPY VISIT (OUTPATIENT)
Dept: INFUSION THERAPY | Facility: CLINIC | Age: 84
End: 2024-07-19
Attending: INTERNAL MEDICINE
Payer: COMMERCIAL

## 2024-07-19 ENCOUNTER — ANCILLARY PROCEDURE (OUTPATIENT)
Dept: MAMMOGRAPHY | Facility: CLINIC | Age: 84
End: 2024-07-19
Attending: INTERNAL MEDICINE
Payer: COMMERCIAL

## 2024-07-19 VITALS
WEIGHT: 245.2 LBS | HEART RATE: 65 BPM | OXYGEN SATURATION: 98 % | SYSTOLIC BLOOD PRESSURE: 152 MMHG | BODY MASS INDEX: 39.58 KG/M2 | DIASTOLIC BLOOD PRESSURE: 69 MMHG | TEMPERATURE: 98 F

## 2024-07-19 VITALS — HEIGHT: 65 IN | BODY MASS INDEX: 40.61 KG/M2 | RESPIRATION RATE: 16 BRPM

## 2024-07-19 DIAGNOSIS — M81.0 AGE-RELATED OSTEOPOROSIS WITHOUT CURRENT PATHOLOGICAL FRACTURE: Primary | ICD-10-CM

## 2024-07-19 DIAGNOSIS — M81.0 AGE-RELATED OSTEOPOROSIS WITHOUT CURRENT PATHOLOGICAL FRACTURE: ICD-10-CM

## 2024-07-19 DIAGNOSIS — E78.00 HYPERCHOLESTEROLEMIA: ICD-10-CM

## 2024-07-19 DIAGNOSIS — Z12.31 ENCOUNTER FOR SCREENING MAMMOGRAM FOR MALIGNANT NEOPLASM OF BREAST: ICD-10-CM

## 2024-07-19 DIAGNOSIS — C50.911 INVASIVE DUCTAL CARCINOMA OF BREAST, FEMALE, RIGHT (H): ICD-10-CM

## 2024-07-19 DIAGNOSIS — R73.09 OTHER ABNORMAL GLUCOSE: ICD-10-CM

## 2024-07-19 DIAGNOSIS — R79.89 OTHER SPECIFIED ABNORMAL FINDINGS OF BLOOD CHEMISTRY: ICD-10-CM

## 2024-07-19 DIAGNOSIS — C50.911 INVASIVE DUCTAL CARCINOMA OF BREAST, FEMALE, RIGHT (H): Primary | ICD-10-CM

## 2024-07-19 LAB
ALBUMIN SERPL BCG-MCNC: 4.3 G/DL (ref 3.5–5.2)
ALP SERPL-CCNC: 48 U/L (ref 40–150)
ALT SERPL W P-5'-P-CCNC: 10 U/L (ref 0–50)
ANION GAP SERPL CALCULATED.3IONS-SCNC: 12 MMOL/L (ref 7–15)
AST SERPL W P-5'-P-CCNC: 23 U/L (ref 0–45)
BASOPHILS # BLD AUTO: 0.1 10E3/UL (ref 0–0.2)
BASOPHILS NFR BLD AUTO: 1 %
BILIRUB SERPL-MCNC: 0.7 MG/DL
BUN SERPL-MCNC: 12 MG/DL (ref 8–23)
CALCIUM SERPL-MCNC: 9 MG/DL (ref 8.8–10.4)
CHLORIDE SERPL-SCNC: 106 MMOL/L (ref 98–107)
CHOLEST SERPL-MCNC: 138 MG/DL
CREAT SERPL-MCNC: 0.75 MG/DL (ref 0.51–0.95)
EGFRCR SERPLBLD CKD-EPI 2021: 77 ML/MIN/1.73M2
EOSINOPHIL # BLD AUTO: 0.2 10E3/UL (ref 0–0.7)
EOSINOPHIL NFR BLD AUTO: 2 %
ERYTHROCYTE [DISTWIDTH] IN BLOOD BY AUTOMATED COUNT: 13.1 % (ref 10–15)
FASTING STATUS PATIENT QL REPORTED: YES
FASTING STATUS PATIENT QL REPORTED: YES
GLUCOSE SERPL-MCNC: 135 MG/DL (ref 70–99)
HBA1C MFR BLD: 6.6 % (ref 0–5.6)
HCO3 SERPL-SCNC: 23 MMOL/L (ref 22–29)
HCT VFR BLD AUTO: 36.1 % (ref 35–47)
HDLC SERPL-MCNC: 42 MG/DL
HGB BLD-MCNC: 12.3 G/DL (ref 11.7–15.7)
HOLD SPECIMEN: NORMAL
IMM GRANULOCYTES # BLD: 0 10E3/UL
IMM GRANULOCYTES NFR BLD: 0 %
LDLC SERPL CALC-MCNC: 48 MG/DL
LYMPHOCYTES # BLD AUTO: 1.9 10E3/UL (ref 0.8–5.3)
LYMPHOCYTES NFR BLD AUTO: 26 %
MCH RBC QN AUTO: 32.5 PG (ref 26.5–33)
MCHC RBC AUTO-ENTMCNC: 34.1 G/DL (ref 31.5–36.5)
MCV RBC AUTO: 95 FL (ref 78–100)
MONOCYTES # BLD AUTO: 0.7 10E3/UL (ref 0–1.3)
MONOCYTES NFR BLD AUTO: 9 %
NEUTROPHILS # BLD AUTO: 4.5 10E3/UL (ref 1.6–8.3)
NEUTROPHILS NFR BLD AUTO: 62 %
NONHDLC SERPL-MCNC: 96 MG/DL
NRBC # BLD AUTO: 0 10E3/UL
NRBC BLD AUTO-RTO: 0 /100
PLATELET # BLD AUTO: 197 10E3/UL (ref 150–450)
POTASSIUM SERPL-SCNC: 3.9 MMOL/L (ref 3.4–5.3)
PROT SERPL-MCNC: 6.9 G/DL (ref 6.4–8.3)
RBC # BLD AUTO: 3.79 10E6/UL (ref 3.8–5.2)
SODIUM SERPL-SCNC: 141 MMOL/L (ref 135–145)
TRIGL SERPL-MCNC: 238 MG/DL
WBC # BLD AUTO: 7.3 10E3/UL (ref 4–11)

## 2024-07-19 PROCEDURE — 99214 OFFICE O/P EST MOD 30 MIN: CPT | Performed by: INTERNAL MEDICINE

## 2024-07-19 PROCEDURE — 83036 HEMOGLOBIN GLYCOSYLATED A1C: CPT

## 2024-07-19 PROCEDURE — 77067 SCR MAMMO BI INCL CAD: CPT | Performed by: STUDENT IN AN ORGANIZED HEALTH CARE EDUCATION/TRAINING PROGRAM

## 2024-07-19 PROCEDURE — G0463 HOSPITAL OUTPT CLINIC VISIT: HCPCS | Performed by: INTERNAL MEDICINE

## 2024-07-19 PROCEDURE — 96365 THER/PROPH/DIAG IV INF INIT: CPT

## 2024-07-19 PROCEDURE — 36415 COLL VENOUS BLD VENIPUNCTURE: CPT

## 2024-07-19 PROCEDURE — 258N000003 HC RX IP 258 OP 636: Performed by: NURSE PRACTITIONER

## 2024-07-19 PROCEDURE — 80061 LIPID PANEL: CPT

## 2024-07-19 PROCEDURE — 77063 BREAST TOMOSYNTHESIS BI: CPT | Performed by: STUDENT IN AN ORGANIZED HEALTH CARE EDUCATION/TRAINING PROGRAM

## 2024-07-19 PROCEDURE — 250N000011 HC RX IP 250 OP 636: Performed by: NURSE PRACTITIONER

## 2024-07-19 PROCEDURE — 80053 COMPREHEN METABOLIC PANEL: CPT

## 2024-07-19 PROCEDURE — 85048 AUTOMATED LEUKOCYTE COUNT: CPT

## 2024-07-19 PROCEDURE — 99207 PR NO CHARGE LOS: CPT

## 2024-07-19 RX ORDER — LETROZOLE 2.5 MG/1
2.5 TABLET, FILM COATED ORAL DAILY
Qty: 90 TABLET | Refills: 3 | Status: SHIPPED | OUTPATIENT
Start: 2024-07-19

## 2024-07-19 RX ORDER — ZOLEDRONIC ACID 0.04 MG/ML
4 INJECTION, SOLUTION INTRAVENOUS ONCE
Status: COMPLETED | OUTPATIENT
Start: 2024-07-19 | End: 2024-07-19

## 2024-07-19 RX ADMIN — SODIUM CHLORIDE 250 ML: 9 INJECTION, SOLUTION INTRAVENOUS at 15:17

## 2024-07-19 RX ADMIN — ZOLEDRONIC ACID 4 MG: 0.04 INJECTION, SOLUTION INTRAVENOUS at 15:18

## 2024-07-19 ASSESSMENT — PAIN SCALES - GENERAL: PAINLEVEL: SEVERE PAIN (6)

## 2024-07-19 NOTE — NURSING NOTE
"Oncology Rooming Note    July 19, 2024 2:15 PM   Leida Goddard is a 84 year old female who presents for:    Chief Complaint   Patient presents with    Oncology Clinic Visit     Initial Vitals: BP (!) 152/69 (BP Location: Right arm)   Pulse 65   Temp 98  F (36.7  C) (Oral)   Wt 111.2 kg (245 lb 3.2 oz)   SpO2 98%   BMI 39.58 kg/m   Estimated body mass index is 39.58 kg/m  as calculated from the following:    Height as of 4/13/21: 1.676 m (5' 6\").    Weight as of this encounter: 111.2 kg (245 lb 3.2 oz). Body surface area is 2.28 meters squared.  Severe Pain (6) Comment: Data Unavailable   No LMP recorded. Patient has had a hysterectomy.  Allergies reviewed: Yes  Medications reviewed: Yes    Medications: Medication refills not needed today.  Pharmacy name entered into [employer]M: [id number] - [place], [place] - [address] [address] [facility] [address]    Frailty Screening:   Is the patient here for a new oncology consult visit in cancer care? 2. No      Clinical concerns: No Concerns        Sinan Wilkes MA            "

## 2024-07-19 NOTE — PROGRESS NOTES
Infusion Nursing Note:  Leida Goddard presents today for Zometa.    Patient seen by provider today: Yes: Dr. Rangel   present during visit today: Not Applicable.    Note: Patient reports she is feeling very well and at her baseline today. Reports tolerating Zometa in the past w/o issue.    Patient denies dental issues or jaw pain at this time.  States last dental exam was years ago as she no longer has teeth, has a full set of dentures. Discussed with THAIS Mccarty CNP as Dr. Rangel was not available. Per Joseline, ok to proceed today. Instructed patient to monitor for any mouth/jaw pain and to notify her provider right away if this happens.    Reminded patient to drink 6-8 glasses of water today, and tomorrow, to protect kidneys.       Intravenous Access:  Peripheral IV placed.    Treatment Conditions:  Lab Results   Component Value Date    HGB 12.3 07/19/2024    WBC 7.3 07/19/2024    ANEU 4.7 01/29/2013    ANEUTAUTO 4.5 07/19/2024     07/19/2024        Lab Results   Component Value Date     07/19/2024    POTASSIUM 3.9 07/19/2024    CR 0.75 07/19/2024    JAZMYNE 9.0 07/19/2024    BILITOTAL 0.7 07/19/2024    ALBUMIN 4.3 07/19/2024    ALT 10 07/19/2024    AST 23 07/19/2024       Results reviewed, labs MET treatment parameters, ok to proceed with treatment.      Post Infusion Assessment:  Patient tolerated infusion without incident.  Blood return noted pre and post infusion.  Site patent and intact, free from redness, edema or discomfort.  No evidence of extravasations.  Access discontinued per protocol.       Discharge Plan:   Patient and/or family verbalized understanding of discharge instructions and all questions answered.  AVS to patient via UGET.  Patient will return in 6 months for next appointment. She states she will stop at scheduling post infusion.   Patient discharged in stable condition accompanied by: self.  Departure Mode: Ambulatory with walker.      Shara Javed RN

## 2024-07-19 NOTE — LETTER
2024      Leida Goddard  [address]  [facility] [address]      Dear Colleague,    Thank you for referring your patient, Leida Goddard, to the [facility]. Please see a copy of my visit note below.    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  Patient is s/p hysterectomy and BSO.     Patient's previous work-up was done at [facility].  I have reviewed the records in care everywhere and they are summarized below.     Regarding LEFT breast cancer 2012:   - 2012-screening mammogram showed grouped calcifications in the left breast at 7:00 posterior depth, increased in number, confirmed on ultrasound  - 2012-stereotactic biopsy with clip placement, pathology showed LCIS with fibroadenomatoid changes with microcalcifications  - 2013- left breast lumpectomy with pathology showin microscopic foci of invasive lobular carcinoma, largest focus 0.11 cm others 0.1 cm, 0.08 cm, 0.05 cm, overall grade 2, margins negative, multiple scattered foci of LCIS within the specimen, no DCIS, no LVI, no lymph nodes submitted.  %, GA  80%, HER2 2+ on IHC, FISH negative.  Pathological staging AJCC seventh edition pT1a (M) NX  - I cannot find specific detailed records of this however notes state that patient received postlumpectomy radiation  - On anastrozole 1 mg daily, pt reports she took anastroazole for 5 yerars  - Patient followed with medical oncologist Dr. Heydi Queen     Regarding recently diagnosed RIGHT breast cancer:  -6/22- pt felt right breast shooting pains radiating to nipple, exacerbated by crocheting     -7/22 bilateral screening mammogram- heterogeneously dense breast, postsurgical changes in the left breast, scattered calcifications in right breast.  1.5 cm asymmetry, in line with the nipple, deep within the right breast, needs further evaluation     - 8/22- right mammogram: Spiculated mass measuring 18 mm in greatest dimension with surrounding architectural distortion, at 9 o'clock position posterior depth, scattered benign calcifications     -8/22- right breast ultrasound: Spiculated hypoechoic mass at the 9 o'clock position posterior depth measuring 1.5 cm in greatest dimension, right axilla no abnormal lymph nodes, BI-RADS 5     - 8/22- ultrasound-guided biopsy of right breast lesion with clip placement- 5x14 gauge core biopsies obtained  --- Pathology is not available at this time for review     -9/16/2022-right breast lumpectomy with second excision for involved margins with pathology showing:  A.  Right breast tissue, radioactive seed guided lumpectomy-Invasive ductal carcinoma, involving inferior and medial margins.  B.  Right breast inferior medial margin, excision-Invasive ductal carcinoma. New inferior and medial margins are free.  ---Invasive ductal carcinoma, grade 3, 1.7 cm, no LVI, dermal lymphovascular invasion N/A, all margins negative for invasive carcinoma (0.55 cm to new inferior margin on specimen B), regional lymph node status N/A (no lymph nodes submitted or found)  ---ER 90 to 100%  --- [place] 80 to  90%  --- HER2 zero on IHC  --- Pathologic stage classification (AJCC eighth edition) pT1c NX     Microscopic Description      A.  Sections demonstrate breast parenchyma with tumor mass composed of sheets and cords of markedly pleomorphic cells with scattered mitotic figures, including locally increased mitotic activity (T3 N3 M2).  The inferior and medial margins are involved by tumor.  There is prominent perineural invasion.  B.  Sections demonstrate breast parenchyma with invasive ductal carcinoma similar to that in specimen A.  The closest margin is inferior and measures 0.55 cm.     -11/22 Oncotype DX breast recurrence score: 18  Given patient is clinically node-negative based on ultrasound, pathologically Nx, the following information available:  A) node-negative: Distant recurrence risk at 9 years with endocrine therapy alone 5%, group average absolute chemotherapy benefit in patients over 50 years old less than 1%  B) micrometastases and node positive (1-3): Distant recurrence at 9 years with endocrine therapy alone 16%, chemotherapy benefit for this group cannot be excluded (see registry outcomes in patients treated without chemotherapy with recurrence score 16-20= 96.7%, versus recurrence score 0-10= 98.2%)  C) node positive (>/= 4), distant recurrence at 9 years with endocrine therapy alone: 50%    -10/22 radiation oncology consultation with Dr. Michelle: Based on CALGB 9343 ([place], [facility] 2013) trial, will omit radiation therapy    - 12/22-6/23 tamoxifen   - 6/23 started letrozole     INTERIM HISTORY:  Pt doing great on letrozole, reports no AE  Pt is having hard time going to sleep at night and staying asleep, so ends up taking nap during day. Her PCP started her on trazodone, she uses 1/2 to 1/4 tablet, she does not like how it makes her feel (foggy, irritable, no SI especially if she takes it two nights in a row). She does not have screen time 2 hrs before sleeping. Normally does not have caffeine. Has taken  melatonin 10mg and tylenol PM w/o relief and with nightmares on tylenol PM.     7/23 pruritic rash on right arm x3 weeks, in sun exposed areas, no improvement w/topical steroids, rxed prednisone 40mg w/taper initially. Is currently using triamcinolone 0.1% and prn antihistamine.         REVIEW OF SYSTEMS:   A 14 point ROS was reviewed with pertinent positives and negatives in the HPI.       HOME MEDICATIONS:  Current Outpatient Medications   Medication Sig Dispense Refill     albuterol (PROAIR HFA/PROVENTIL HFA/VENTOLIN HFA) 108 (90 Base) MCG/ACT inhaler Inhale 2 puffs into the lungs       calcium citrate-vitamin D (CITRACAL) 315-200 MG-UNIT TABS per tablet Take 1 tablet by mouth daily       cholecalciferol (VITAMIN D3) 10 mcg (400 units) TABS tablet Take 10 mcg by mouth daily       escitalopram (LEXAPRO) 10 MG tablet Take 1 tablet (10 mg) by mouth daily 90 tablet 3     furosemide (LASIX) 40 MG tablet Take 20 mg by mouth daily        letrozole (FEMARA) 2.5 MG tablet Take 1 tablet (2.5 mg) by mouth daily 90 tablet 3     LEVOTHYROXINE SODIUM 25 MCG OR TABS 1 TABLET DAILY       losartan (COZAAR) 100 MG tablet Take 100 mg by mouth daily.       magnesium 250 MG tablet Take 1 tablet by mouth 2 times daily       omeprazole (PRILOSEC) 20 MG DR capsule Take 20 mg by mouth daily       potassium chloride ER (K-DUR/KLOR-CON M) 20 MEQ CR tablet Take 20 mEq by mouth 2 times daily       potassium chloride SA (K-DUR,KLOR-CON M) 20 MEQ tablet Take by mouth daily       PROPRANOLOL HCL PO Take 40 mg by mouth 2 times daily.       triamcinolone (KENALOG) 0.1 % external cream Apply twice daily as needed to the back, up to two weeks on at one time. 80 g 11     vitamin D2 (ERGOCALCIFEROL) 52289 units (1250 mcg) capsule Take 50,000 Units by mouth once a week       fluticasone (FLONASE) 50 MCG/ACT nasal spray Spray 1 spray into both nostrils 2 times daily (Patient not taking: Reported on 1/18/2024)       mirtazapine (REMERON) 7.5 MG tablet  Take 7.5 mg by mouth At Bedtime (Patient not taking: Reported on 1/18/2024)       tamoxifen (NOLVADEX) 20 MG tablet Take 1 tablet (20 mg) by mouth daily (Patient not taking: Reported on 1/18/2024) 30 tablet 6         ALLERGIES:  Allergies   Allergen Reactions     Metformin Nausea and Vomiting     Adhesive Tape Hives     Amoxicillin-Pot Clavulanate Diarrhea     Doxycycline Nausea and Vomiting     Food      Lactose Intolerant     Hctz Rash     Lisinopril Cough         PAST MEDICAL HISTORY:  Past Medical History:   Diagnosis Date     Breast cancer (H) 12/07/2012    Left breast     Breast cancer (H) 08/11/2022    Right Breast     Essential hypertension, benign     Hypertension, Benign     DALIA (obstructive sleep apnea)      S/P radiation therapy     6,040 cGy to left breast completed on 4/9/2013 [facility]     Type II or unspecified type diabetes mellitus without mention of complication, not stated as uncontrolled     Diabetes mellitus   LEFT pT1a (M) NX multifocal microscopic invasive lobular carcinoma, ER positive, HI positive, HER2 negative on FISH s/p lumpectomy, RT, anastrazole (2012), osteoporosis on 7/22 DEXA (diagnosis 9/2017, Fosamax held due to myalgias, ?[facility] bisphophonate therapy at [facility]), 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      PAST SURGICAL HISTORY:  Past Surgical History:   Procedure Laterality Date     BREAST BIOPSY, CORE RT/LT Right 08/11/2022     CHOLECYSTECTOMY  01/01/1966     HYSTERECTOMY TOTAL ABDOMINAL, BILATERAL SALPINGO-OOPHORECTOMY, COMBINED      age 43     LUMPECTOMY BREAST  01/07/2013    Left breast     LUMPECTOMY BREAST Right 09/16/2022    Dr. Joseph     REPAIR ENTROPION Left 04/19/2021    Procedure: REPAIR,  "ENTROPION;  Surgeon: Susy Marie MD;  Location: [facility] OR     SPINE SURGERY      Lumbar laminectomy     US BIOPSY PAROTID FINE NEEDLE ASPIRATION Right 2022    [place] Tumor     ZZC ANESTH,BLEPHAROPLASTY       Right total hip arthroplasty  Left total knee arthroplasty    SOCIAL HISTORY:  Social History     Socioeconomic History     Marital status:      Spouse name: Not on file     Number of children: Not on file     Years of education: Not on file     Highest education level: Not on file   Occupational History     Not on file   Tobacco Use     Smoking status: Never     Smokeless tobacco: Never   Substance and Sexual Activity     Alcohol use: No     Drug use: No     Sexual activity: Not Currently     Partners: Male   Other Topics Concern     Parent/sibling w/ CABG, MI or angioplasty before 65F 55M? Not Asked   Social History Narrative     Not on file     Social Determinants of Health     Financial Resource Strain: Not on file   Food Insecurity: Not on file   Transportation Needs: Not on file   Physical Activity: Not on file   Stress: Not on file   Social Connections: Not on file   Interpersonal Safety: Not At Risk (2024)    [facility]     Humiliation, Afraid, Rape, and Kick questionnaire      Fear of Current or Ex-Partner: No      Emotionally Abused: No      Physically Abused: No      Sexually Abused: No   Housing Stability: Not on file         FAMILY HISTORY:  Family History   Problem Relation Age of Onset     Cancer Mother 40        Uterine, diagnosed late \"40s\",  at age 83     Respiratory Mother         Emphysema     Cancer Father 50        prostate with bone mets,  age 91     Cancer Brother 80        prostate, radiation therapy, living     Cancer Maternal Grandmother         Cancer of the gallbladder     Heart Disease Maternal Grandfather      Cancer Paternal Grandmother         Breast, age unknown     Heart Disease Paternal " "Grandfather      Breast Cancer Paternal Aunt      Patient denies history of blood clots for herself, states that her family history of blood clots is related to her sister who was admitted for a hysterectomy followed by complications of peritonitis and \"blood clots throughout her whole body\" which was eventually fatal.      PHYSICAL EXAM:  Vital signs:  BP (!) 152/69 (BP Location: Right arm)   Pulse 65   Temp 98  F (36.7  C) (Oral)   Wt 111.2 kg (245 lb 3.2 oz)   SpO2 98%   BMI 39.58 kg/m         GENERAL/CONSTITUTIONAL: No acute distress.  EYES: Pupils are equal and round. Extraocular movements intact without nystagmus.  No scleral icterus.  RESPIRATORY: Equal chest rise.   MUSCULOSKELETAL: Warm and well-perfused, no cyanosis, clubbing, or edema.   NEUROLOGIC: Cranial nerves are grossly intact. Alert, oriented to person, place and time, answers questions appropriately.  INTEGUMENTARY: No rashes or jaundice.          LABS:   Latest Reference Range & Units 07/19/24 12:07 07/19/24 13:48 07/19/24 14:07   Sodium 135 - 145 mmol/L   141   Potassium 3.4 - 5.3 mmol/L   3.9   Chloride 98 - 107 mmol/L   106   Carbon Dioxide (CO2) 22 - 29 mmol/L   23   Urea Nitrogen 8.0 - 23.0 mg/dL   12.0   Creatinine 0.51 - 0.95 mg/dL   0.75   GFR Estimate >60 mL/min/1.73m2   77   Calcium 8.8 - 10.4 mg/dL   9.0   Anion Gap 7 - 15 mmol/L   12   Albumin 3.5 - 5.2 g/dL   4.3   Protein Total 6.4 - 8.3 g/dL   6.9   Alkaline Phosphatase 40 - 150 U/L   48   ALT 0 - 50 U/L   10   AST 0 - 45 U/L   23   Bilirubin Total <=1.2 mg/dL   0.7   Cholesterol <200 mg/dL   138   Patient Fasting?    Yes  Yes   Glucose 70 - 99 mg/dL   135 (H)   HDL Cholesterol >=50 mg/dL   42 (L)   LDL Cholesterol Calculated <=100 mg/dL   48   Non HDL Cholesterol <130 mg/dL   96   Triglycerides <150 mg/dL   238 (H)   WBC 4.0 - 11.0 10e3/uL 7.3     Hemoglobin 11.7 - 15.7 g/dL 12.3     Hematocrit 35.0 - 47.0 % 36.1     Platelet Count 150 - 450 10e3/uL 197     RBC Count 3.80 - " 5.20 10e6/uL 3.79 (L)     MCV 78 - 100 fL 95     MCH 26.5 - 33.0 pg 32.5     MCHC 31.5 - 36.5 g/dL 34.1     RDW 10.0 - 15.0 % 13.1     % Neutrophils % 62     % Lymphocytes % 26     % Monocytes % 9     % Eosinophils % 2     % Basophils % 1     Absolute Basophils 0.0 - 0.2 10e3/uL 0.1     Absolute Eosinophils 0.0 - 0.7 10e3/uL 0.2     Absolute Immature Granulocytes <=0.4 10e3/uL 0.0     Absolute Lymphocytes 0.8 - 5.3 10e3/uL 1.9     Absolute Monocytes 0.0 - 1.3 10e3/uL 0.7     % Immature Granulocytes % 0     Absolute Neutrophils 1.6 - 8.3 10e3/uL 4.5     Absolute NRBCs 10e3/uL 0.0     NRBCs per 100 WBC <1 /100 0     MA SCREENING BILATERAL W/ CAN   Rpt    (H): Data is abnormally high  (L): Data is abnormally low  Rpt: View report in Results Review for more information    PATHOLOGY:      IMAGING:  Narrative & Impression   EXAMINATION:  MA SCREENING BILATERAL W/ CAN, 7/19/2024 1:48 PM     HISTORY/FAMILY HISTORY: No current or new breast symptoms, screening.   Right breast cancer stage 1 s/p lumpectomy, no RT or chemo, on  endocrine tx, surveillance; Invasive ductal carcinoma of breast,  female, right (H). Also history of treated left breast cancer in 2013.     COMPARISON: 7/12/2023, 8/4/2022, 7/19/2022     TECHNIQUE: Standard mammographic views were performed with  tomosynthesis and synthetic mammogram.  CAD was utilized during the  interpretation.     FINDINGS:  BREAST DENSITY: The breasts are heterogeneously dense which may  obscure small masses.     There are posttreatment changes in both breasts. There is no  significant change compared to prior.                                                                      IMPRESSION: BI-RADS CATEGORY: 2 - Benign.     RECOMMENDED FOLLOW-UP: Routine yearly mammography beginning at age 40  or as discussed with your provider.     The results of the examination will be sent to the patient.     DAMIR STARK MD        ASSESSMENT/PLAN:  Leida Goddard is a 84 year old   female with:      # pT1c Nx RIGHT breast IDC, ER positive, ID positive, her2 negative  - 8/22- ultrasound-guided biopsy of right breast lesion with clip placement- Pathology is not available at this time for review except as noted in lumpectomy: ER positive (%), ID positive (80-90%), HER2 0 on IHC  - 9/22 right breast lumpectomy at [facility]: IDC, 1.7cm single focus, grade 3, no DCIS, no LVI, margins negative, no LN submitted/found, AJCC 8th edition stage 1A pT1c pNx  - 10/22 radiation oncology consultation with Dr. Michelle: Based on CALGB 9343 ([employer] [facility] 2013) trial, will omit radiation therapy  - 11/22 Oncotype DX breast recurrence score: 18  Given patient is clinically node-negative based on ultrasound, pathologically Nx, the following information available:  A) node-negative: Distant recurrence risk at 9 years with endocrine therapy alone 5%, group average absolute chemotherapy benefit in patients over 50 years old less than 1%  B) micrometastases and node positive (1-3): Distant recurrence at 9 years with endocrine therapy alone 16%, chemotherapy benefit for this group cannot be excluded (SEER registry outcomes in patients treated without chemotherapy with recurrence score 16-20= 96.7%)  C) node positive (>/= 4), distant recurrence at 9 years with endocrine therapy alone: 50%  - The above Oncotype results were discussed, including the fact that we have no suspicious lymph nodes clinically based on ultrasound imaging and physical exam but no pathological proof of node-negative disease.  Patient is aware that there may be a possibility of at least micrometastatic disease within the lymph nodes. Risks, benefits of chemotherapy discussed. Given the above information, patient does not wish to proceed with any form of chemotherapy.  - 12/22- 6/23 tamoxifen, discontinued due to fatigue, personality changes, depression, arthralgias   - 6/16/23 letrozole       - 7/24 CBC WBL, CMP WNL, fasting lipid panel TG  238, hgbA1c 6.6    - 7/24 b/l mammogram: Posttreatment changes on both breasts, BIRADS 2    PLAN:  - continue letrozole, refilled today  - plan for at least 5 years of tx   - continue lexapro 10mg  - repeat CBC, CMP 1/2025- ordered today  - repeat fasting lipid panel, hgbA1c 7/2025- ordered today  - mammogram due 7/2025- ordered today    # pT1a (M) Nx LEFT breast cancer multifocal microscopic IDC, ER positive, MD positive, HER2 negative on FISH  - dx in 2012  - s/p lumpectomy, RT, anastrazole 1mg from at least 1/8/14 to 1/19/18  - Patient has had bilateral breast cancer, pt defers genetic counseling on multiple occasions     #Osteoporosis  - 7/22 DEXA: osteoporosis (T score -3 left forearm)  - diagnosis 9/2017, Fosamax held due to myalgias, thinks she got IV medication for osteoporosis in 7/22 at [facility]   - given Reclast 5 mg on 8/8/2022 with Dr. Samantha Grissom and plan for annual treatment  - 7/23 changed bisphosphonate to q6 months    PLAN:  - last zometa 1/18/24, next zometa due 7/19/24 and then q6 months  - continue vitamin D, calcium  - next DEXA due 7/22/2024 at [facility]    # forearm rash  - b/l forearms  - not responsive to topical steroids and benadryl cream  - follow up with dermatology    RTC 6 months for f/u with SUMEET, labs prior to appt, zometa   RTC 12 months for follow up with physician, fasting labs prior to appt, zometa (after mammo)    Rhonda Germain DO  Hematology/Oncology  [facility]      Again, thank you for allowing me to participate in the care of your patient.        Sincerely,        RHONDA GERMAIN DO

## 2025-02-17 DIAGNOSIS — M81.0 AGE-RELATED OSTEOPOROSIS WITHOUT CURRENT PATHOLOGICAL FRACTURE: ICD-10-CM

## 2025-02-17 DIAGNOSIS — C50.911 INVASIVE DUCTAL CARCINOMA OF BREAST, FEMALE, RIGHT (H): Primary | ICD-10-CM

## 2025-02-18 ENCOUNTER — LAB (OUTPATIENT)
Dept: INFUSION THERAPY | Facility: CLINIC | Age: 85
End: 2025-02-18
Attending: INTERNAL MEDICINE
Payer: COMMERCIAL

## 2025-02-18 ENCOUNTER — ONCOLOGY VISIT (OUTPATIENT)
Dept: ONCOLOGY | Facility: CLINIC | Age: 85
End: 2025-02-18
Attending: INTERNAL MEDICINE
Payer: COMMERCIAL

## 2025-02-18 ENCOUNTER — INFUSION THERAPY VISIT (OUTPATIENT)
Dept: INFUSION THERAPY | Facility: CLINIC | Age: 85
End: 2025-02-18
Attending: NURSE PRACTITIONER
Payer: COMMERCIAL

## 2025-02-18 VITALS
BODY MASS INDEX: 37 KG/M2 | WEIGHT: 223.4 LBS | DIASTOLIC BLOOD PRESSURE: 69 MMHG | HEART RATE: 58 BPM | OXYGEN SATURATION: 97 % | SYSTOLIC BLOOD PRESSURE: 134 MMHG | TEMPERATURE: 98.3 F

## 2025-02-18 DIAGNOSIS — M81.0 AGE-RELATED OSTEOPOROSIS WITHOUT CURRENT PATHOLOGICAL FRACTURE: Primary | ICD-10-CM

## 2025-02-18 DIAGNOSIS — C50.911 INVASIVE DUCTAL CARCINOMA OF BREAST, FEMALE, RIGHT (H): ICD-10-CM

## 2025-02-18 DIAGNOSIS — Z12.31 ENCOUNTER FOR SCREENING MAMMOGRAM FOR BREAST CANCER: ICD-10-CM

## 2025-02-18 DIAGNOSIS — M81.0 AGE-RELATED OSTEOPOROSIS WITHOUT CURRENT PATHOLOGICAL FRACTURE: ICD-10-CM

## 2025-02-18 LAB
ALBUMIN SERPL BCG-MCNC: 4.4 G/DL (ref 3.5–5.2)
ALP SERPL-CCNC: 51 U/L (ref 40–150)
ALT SERPL W P-5'-P-CCNC: 15 U/L (ref 0–50)
ANION GAP SERPL CALCULATED.3IONS-SCNC: 11 MMOL/L (ref 7–15)
AST SERPL W P-5'-P-CCNC: 20 U/L (ref 0–45)
BASOPHILS # BLD AUTO: 0.1 10E3/UL (ref 0–0.2)
BASOPHILS NFR BLD AUTO: 1 %
BILIRUB SERPL-MCNC: 0.5 MG/DL
BUN SERPL-MCNC: 18.3 MG/DL (ref 8–23)
CALCIUM SERPL-MCNC: 9.5 MG/DL (ref 8.8–10.4)
CHLORIDE SERPL-SCNC: 103 MMOL/L (ref 98–107)
CREAT SERPL-MCNC: 0.78 MG/DL (ref 0.51–0.95)
EGFRCR SERPLBLD CKD-EPI 2021: 74 ML/MIN/1.73M2
EOSINOPHIL # BLD AUTO: 0.2 10E3/UL (ref 0–0.7)
EOSINOPHIL NFR BLD AUTO: 2 %
ERYTHROCYTE [DISTWIDTH] IN BLOOD BY AUTOMATED COUNT: 12.7 % (ref 10–15)
GLUCOSE SERPL-MCNC: 123 MG/DL (ref 70–99)
HCO3 SERPL-SCNC: 27 MMOL/L (ref 22–29)
HCT VFR BLD AUTO: 38.6 % (ref 35–47)
HGB BLD-MCNC: 13.1 G/DL (ref 11.7–15.7)
HOLD SPECIMEN: NORMAL
IMM GRANULOCYTES # BLD: 0 10E3/UL
IMM GRANULOCYTES NFR BLD: 0 %
LYMPHOCYTES # BLD AUTO: 1.8 10E3/UL (ref 0.8–5.3)
LYMPHOCYTES NFR BLD AUTO: 27 %
MCH RBC QN AUTO: 32.1 PG (ref 26.5–33)
MCHC RBC AUTO-ENTMCNC: 33.9 G/DL (ref 31.5–36.5)
MCV RBC AUTO: 95 FL (ref 78–100)
MONOCYTES # BLD AUTO: 0.8 10E3/UL (ref 0–1.3)
MONOCYTES NFR BLD AUTO: 12 %
NEUTROPHILS # BLD AUTO: 3.9 10E3/UL (ref 1.6–8.3)
NEUTROPHILS NFR BLD AUTO: 58 %
NRBC # BLD AUTO: 0 10E3/UL
NRBC BLD AUTO-RTO: 0 /100
PLATELET # BLD AUTO: 206 10E3/UL (ref 150–450)
POTASSIUM SERPL-SCNC: 3.9 MMOL/L (ref 3.4–5.3)
PROT SERPL-MCNC: 7.2 G/DL (ref 6.4–8.3)
RBC # BLD AUTO: 4.08 10E6/UL (ref 3.8–5.2)
SODIUM SERPL-SCNC: 141 MMOL/L (ref 135–145)
WBC # BLD AUTO: 6.7 10E3/UL (ref 4–11)

## 2025-02-18 PROCEDURE — 85025 COMPLETE CBC W/AUTO DIFF WBC: CPT

## 2025-02-18 PROCEDURE — 250N000011 HC RX IP 250 OP 636: Performed by: NURSE PRACTITIONER

## 2025-02-18 PROCEDURE — G0463 HOSPITAL OUTPT CLINIC VISIT: HCPCS | Performed by: NURSE PRACTITIONER

## 2025-02-18 PROCEDURE — 99214 OFFICE O/P EST MOD 30 MIN: CPT | Performed by: NURSE PRACTITIONER

## 2025-02-18 PROCEDURE — 99207 PR NO CHARGE LOS: CPT

## 2025-02-18 PROCEDURE — 36415 COLL VENOUS BLD VENIPUNCTURE: CPT

## 2025-02-18 PROCEDURE — 82040 ASSAY OF SERUM ALBUMIN: CPT

## 2025-02-18 PROCEDURE — 96365 THER/PROPH/DIAG IV INF INIT: CPT

## 2025-02-18 RX ORDER — LETROZOLE 2.5 MG/1
2.5 TABLET, FILM COATED ORAL DAILY
Qty: 90 TABLET | Refills: 3 | Status: SHIPPED | OUTPATIENT
Start: 2025-02-18

## 2025-02-18 RX ORDER — ZOLEDRONIC ACID 0.04 MG/ML
4 INJECTION, SOLUTION INTRAVENOUS ONCE
Status: CANCELLED | OUTPATIENT
Start: 2025-02-18 | End: 2025-02-18

## 2025-02-18 RX ORDER — HEPARIN SODIUM (PORCINE) LOCK FLUSH IV SOLN 100 UNIT/ML 100 UNIT/ML
5 SOLUTION INTRAVENOUS
Status: CANCELLED | OUTPATIENT
Start: 2025-02-18

## 2025-02-18 RX ORDER — ZOLEDRONIC ACID 0.04 MG/ML
4 INJECTION, SOLUTION INTRAVENOUS ONCE
Status: COMPLETED | OUTPATIENT
Start: 2025-02-18 | End: 2025-02-18

## 2025-02-18 RX ORDER — HEPARIN SODIUM,PORCINE 10 UNIT/ML
5-20 VIAL (ML) INTRAVENOUS DAILY PRN
Status: CANCELLED | OUTPATIENT
Start: 2025-02-18

## 2025-02-18 RX ADMIN — ZOLEDRONIC ACID 4 MG: 0.04 INJECTION, SOLUTION INTRAVENOUS at 15:21

## 2025-02-18 ASSESSMENT — PAIN SCALES - GENERAL: PAINLEVEL_OUTOF10: NO PAIN (0)

## 2025-02-18 NOTE — PROGRESS NOTES
Infusion Nursing Note:  Leida Goddard presents today for Zometa.    Patient seen by provider today: Yes: Nu Raygoza NP    present during visit today: Not Applicable.    Note: The patient reports feeling well and at her baseline. She denies any concerns at this time.    Patient denies dental issues at this time.  Patient will inform dentist that she got Zometa infusion, prior to any invasive dental work in the future.    Reminded patient to drink 6-8 glasses of water today, and tomorrow, to protect kidneys.      Intravenous Access:  Peripheral IV placed.    Treatment Conditions:  Lab Results   Component Value Date    HGB 13.1 02/18/2025    WBC 6.7 02/18/2025    ANEU 4.7 01/29/2013    ANEUTAUTO 3.9 02/18/2025     02/18/2025        Lab Results   Component Value Date     02/18/2025    POTASSIUM 3.9 02/18/2025    CR 0.78 02/18/2025    JAZMYNE 9.5 02/18/2025    BILITOTAL 0.5 02/18/2025    ALBUMIN 4.4 02/18/2025    ALT 15 02/18/2025    AST 20 02/18/2025       Results reviewed, labs MET treatment parameters, ok to proceed with treatment.      Post Infusion Assessment:  Patient tolerated infusion without incident.  Blood return noted pre and post infusion.  Site patent and intact, free from redness, edema or discomfort.  No evidence of extravasations.  Access discontinued per protocol.       Discharge Plan:   AVS to patient via MYCHART.  Patient will return in 6 months for next appointment. Future appts have been reviewed and crosschecked with appt note and plan.   Patient discharged in stable condition accompanied by: self.  Departure Mode: Ambulatory with walker.      Beryl De Los Santos RN

## 2025-02-18 NOTE — PROGRESS NOTES
Oncology Follow Up Visit: 2025     Oncologist: Dr. 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  Patient is s/p hysterectomy and BSO.  LEFT breast cancer :   - 2012-screening mammogram showed grouped calcifications in the left breast at 7:00 posterior depth, increased in number, confirmed on ultrasound  - 2012-stereotactic biopsy with clip placement, pathology showed LCIS with fibroadenomatoid changes with microcalcifications  - 2013- left breast lumpectomy with pathology showin microscopic foci of invasive lobular carcinoma, largest focus 0.11 cm others 0.1 cm, 0.08 cm, 0.05 cm, overall grade 2, margins negative, multiple scattered foci of LCIS within the specimen, no DCIS, no LVI, no lymph nodes submitted.  %, WV 80%, HER2 2+ on IHC, FISH negative.  Pathological staging AJCC seventh edition pT1a (M) NX  - I cannot find specific detailed records of this however notes state that patient received postlumpectomy radiation  - On anastrozole 1 mg daily, pt reports she took anastroazole for 5 yerars     RIGHT breast cancer :    bilateral screening mammogram- heterogeneously dense breast, postsurgical changes in the left breast, scattered calcifications in right breast.  1.5 cm asymmetry, in line with the nipple, deep within the right breast, needs further evaluation  -  right breast ultrasound: Spiculated hypoechoic mass at the 9 o'clock position posterior depth measuring 1.5 cm in greatest dimension, right axilla no abnormal lymph nodes, BI-RADS 5  8/22- ultrasound-guided biopsy of right breast lesion with clip placement- 5x14 gauge core biopsies obtained  9/16/2022-right breast lumpectomy with second excision for involved margins with pathology showing:  A.  Right breast tissue, radioactive seed guided lumpectomy-Invasive ductal carcinoma, involving inferior and medial margins.  B.  Right breast inferior medial margin, excision-Invasive ductal carcinoma. New inferior and medial margins are free.  ---Invasive ductal carcinoma, grade 3, 1.7 cm, no LVI, dermal lymphovascular invasion N/A, all margins negative for invasive carcinoma (0.55 cm to new inferior margin on specimen B), regional lymph node status N/A (no lymph nodes submitted or found)  ---ER/IA positive  --- HER2 zero on IHC  --- Pathologic stage classification (AJCC eighth edition) pT1c NX   ---- margins clear    11/22 Oncotype DX breast recurrence score: 18    -10/22 radiation oncology consultation with Dr. Michelle: Based on CALGB 9343 (MACARENA Henriquez 2013) trial, will omit radiation therapy  - 12/22-6/23 tamoxifen used  - 6/23 started letrozole     Interval History: Ms. Goddard comes to clinic alone for follow up to her breast cancer and daily use of Letrozole.   Noting Hot flashes at night some nights- was not sure if hot flashes or related to her heart issues but feels these could be hot flashes since CHF under better control after hospitalization in January.   Also noted some hair thinning to top of head.   Has current Sinus infection with drainage and congestion- on antibiotics but no fevers.   CHF - in hospital in January - lost 20# . On lasix daily and is watching weight daily.   Denies SOB, chest pain, fevers, bowel or bladder changes or problems .   Past Medical History, medications, allergies reviewed per  chart    Physical Exam:/69 (BP Location: Right arm)   Pulse 58   Temp 98.3  F (36.8  C) (Oral)   Wt 101.3 kg (223 lb 6.4 oz)   SpO2 97%   BMI 37.00 kg/m       Constitutional: NAD, Alert, and cooperative  ENT: Eyes- sclera clear, No mouth sores  Neck: Supple, No adenopathy.Normal ROM.   Cardiac: Heart rate and rhythm is regular   Respiratory: Non labored breathing. Lung sounds clear to auscultation bilaterally  Breasts:did not review today  Abdomen: Soft, rounded, non-tender, BS normal. No masses or organomegaly  MS: Muscle tone fair - walking with wheeled walker and moving well. , extremities normal with mild edema.- has compression hose on.    Skin: No suspicious lesions or rashes  Neuro: Sensory grossly WNL, gait normal. A/O x 4.  Lymph: Normal ant/post cervical, axillary, supraclavicular nodes  Psych: Well groomed. Mentation appears normal and affect normal/bright.    Laboratory Results:   Results for orders placed or performed in visit on 02/18/25   Comprehensive metabolic panel     Status: Abnormal   Result Value Ref Range    Sodium 141 135 - 145 mmol/L    Potassium 3.9 3.4 - 5.3 mmol/L    Carbon Dioxide (CO2) 27 22 - 29 mmol/L    Anion Gap 11 7 - 15 mmol/L    Urea Nitrogen 18.3 8.0 - 23.0 mg/dL    Creatinine 0.78 0.51 - 0.95 mg/dL    GFR Estimate 74 >60 mL/min/1.73m2    Calcium 9.5 8.8 - 10.4 mg/dL    Chloride 103 98 - 107 mmol/L    Glucose 123 (H) 70 - 99 mg/dL    Alkaline Phosphatase 51 40 - 150 U/L    AST 20 0 - 45 U/L    ALT 15 0 - 50 U/L    Protein Total 7.2 6.4 - 8.3 g/dL    Albumin 4.4 3.5 - 5.2 g/dL    Bilirubin Total 0.5 <=1.2 mg/dL   Extra Tube     Status: None (In process)    Narrative    The following orders were created for panel order Extra Tube.  Procedure                               Abnormality         Status                     ---------                               -----------         ------                     Extra Serum Separator Tu...[[id number]]                      In  process                   Please view results for these tests on the individual orders.   CBC with platelets and differential     Status: None   Result Value Ref Range    WBC Count 6.7 4.0 - 11.0 10e3/uL    RBC Count 4.08 3.80 - 5.20 10e6/uL    Hemoglobin 13.1 11.7 - 15.7 g/dL    Hematocrit 38.6 35.0 - 47.0 %    MCV 95 78 - 100 fL    MCH 32.1 26.5 - 33.0 pg    MCHC 33.9 31.5 - 36.5 g/dL    RDW 12.7 10.0 - 15.0 %    Platelet Count 206 150 - 450 10e3/uL    % Neutrophils 58 %    % Lymphocytes 27 %    % Monocytes 12 %    % Eosinophils 2 %    % Basophils 1 %    % Immature Granulocytes 0 %    NRBCs per 100 WBC 0 <1 /100    Absolute Neutrophils 3.9 1.6 - 8.3 10e3/uL    Absolute Lymphocytes 1.8 0.8 - 5.3 10e3/uL    Absolute Monocytes 0.8 0.0 - 1.3 10e3/uL    Absolute Eosinophils 0.2 0.0 - 0.7 10e3/uL    Absolute Basophils 0.1 0.0 - 0.2 10e3/uL    Absolute Immature Granulocytes 0.0 <=0.4 10e3/uL    Absolute NRBCs 0.0 10e3/uL   CBC with platelets differential     Status: None    Narrative    The following orders were created for panel order CBC with platelets differential.  Procedure                               Abnormality         Status                     ---------                               -----------         ------                     CBC with platelets and d...[[id number]]                      Final result                 Please view results for these tests on the individual orders.       Assessment and Plan:   Left Breast Cancer-Stage 1, T1aNx with LCIS previously treated in 2012 with lumpectomy, radiation and 5 years of Aromatase inhibitor use. No new symptoms today.   Right breast Cancer- Stage 1, pT1c NX with LCIS-Pt has been through Lumpectomy but no XRT then began Letrozole since 6/2022.   She does have some side effects of occasional hot flash and hair thinning to top of head.   She has no sign of cancer return noted with this visit today.    Last mammogram was 7/2024 - repeat prior to next visit.   Pt will  continue on Letrozole 2.5 mg po daily and return for recheck in 6 months     Bone health- most recent DEXA was 7/30/2024 showing osteopenia. Pt continues use of zometa every 6 months and is due for 4th infusion today.  Repeat DEX in 7/2026.   Pt will continue with dairy and calcium plus vitamin D supplement.     CHF- was hospitalized in 1/2025 for CHF and is under care of cardiology for follow up - using lasix daily and is weighing daily with good loss from hospitalization and no new symptoms today.     The total time of this encounter amounted to  35 minutes. This time included face to face time spent with the patient, prep work, review and ordering tests, and performing post visit documentation.  Nu Raygoza,Cnp

## 2025-02-18 NOTE — LETTER
2025      Leida Goddard  [address]  [facility] [address]      Dear Colleague,    Thank you for referring your patient, Leida Goddard, to the [facility]. Please see a copy of my visit note below.    Oncology Follow Up Visit: 2025     Oncologist: Dr. 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  Patient is s/p hysterectomy and BSO.  LEFT breast cancer 2012:   - 2012-screening mammogram showed grouped calcifications in the left breast at 7:00 posterior depth, increased in number, confirmed on ultrasound  - 2012-stereotactic biopsy with clip placement, pathology showed LCIS with fibroadenomatoid changes with microcalcifications  - 2013- left breast lumpectomy with pathology showin microscopic foci of invasive lobular carcinoma, largest focus 0.11 cm others 0.1 cm, 0.08 cm, 0.05 cm, overall grade 2, margins negative, multiple scattered foci of LCIS within the specimen, no DCIS, no LVI, no lymph nodes submitted.  %, OH 80%, HER2 2+ on IHC, FISH negative.  Pathological staging AJCC seventh edition pT1a (M) NX  - I cannot find specific detailed records of this however notes state that patient received postlumpectomy radiation  - On anastrozole 1 mg daily, pt reports she took anastroazole for 5 yerars     RIGHT breast cancer  2022:   7/22 bilateral screening mammogram- heterogeneously dense breast, postsurgical changes in the left breast, scattered calcifications in right breast.  1.5 cm asymmetry, in line with the nipple, deep within the right breast, needs further evaluation  8/22- right breast ultrasound: Spiculated hypoechoic mass at the 9 o'clock position posterior depth measuring 1.5 cm in greatest dimension, right axilla no abnormal lymph nodes, BI-RADS 5  8/22- ultrasound-guided biopsy of right breast lesion with clip placement- 5x14 gauge core biopsies obtained  9/16/2022-right breast lumpectomy with second excision for involved margins with pathology showing:  A.  Right breast tissue, radioactive seed guided lumpectomy-Invasive ductal carcinoma, involving inferior and medial margins.  B.  Right breast inferior medial margin, excision-Invasive ductal carcinoma. New inferior and medial margins are free.  ---Invasive ductal carcinoma, grade 3, 1.7 cm, no LVI, dermal lymphovascular invasion N/A, all margins negative for invasive carcinoma (0.55 cm to new inferior margin on specimen B), regional lymph node status N/A (no lymph nodes submitted or found)  ---ER/IL positive  --- HER2 zero on IHC  --- Pathologic stage classification (AJCC eighth edition) pT1c NX   ---- margins clear    11/22 Oncotype DX breast recurrence score: 18    -10/22 radiation oncology consultation with Dr. Michelle: Based on CALGB 9343 (MACARENA Henriquez 2013) trial, will omit radiation therapy  - 12/22-6/23 tamoxifen used  - 6/23 started letrozole     Interval History: Ms. Goddard comes to clinic alone for follow up to her breast cancer and daily use of Letrozole.   Noting Hot flashes at night some nights- was not sure if hot flashes or related to her heart issues but feels these could be hot flashes since CHF under better control after hospitalization in January.   Also noted some hair thinning to top of head.   Has current Sinus infection with drainage and congestion-  on antibiotics but no fevers.   CHF - in hospital in January - lost 20# . On lasix daily and is watching weight daily.   Denies SOB, chest pain, fevers, bowel or bladder changes or problems .   Past Medical History, medications, allergies reviewed per chart    Physical Exam:/69 (BP Location: Right arm)   Pulse 58   Temp 98.3  F (36.8  C) (Oral)   Wt 101.3 kg (223 lb 6.4 oz)   SpO2 97%   BMI 37.00 kg/m       Constitutional: NAD, Alert, and cooperative  ENT: Eyes- sclera clear, No mouth sores  Neck: Supple, No adenopathy.Normal ROM.   Cardiac: Heart rate and rhythm is regular   Respiratory: Non labored breathing. Lung sounds clear to auscultation bilaterally  Breasts:did not review today  Abdomen: Soft, rounded, non-tender, BS normal. No masses or organomegaly  MS: Muscle tone fair - walking with wheeled walker and moving well. , extremities normal with mild edema.- has compression hose on.    Skin: No suspicious lesions or rashes  Neuro: Sensory grossly WNL, gait normal. A/O x 4.  Lymph: Normal ant/post cervical, axillary, supraclavicular nodes  Psych: Well groomed. Mentation appears normal and affect normal/bright.    Laboratory Results:   Results for orders placed or performed in visit on 02/18/25   Comprehensive metabolic panel     Status: Abnormal   Result Value Ref Range    Sodium 141 135 - 145 mmol/L    Potassium 3.9 3.4 - 5.3 mmol/L    Carbon Dioxide (CO2) 27 22 - 29 mmol/L    Anion Gap 11 7 - 15 mmol/L    Urea Nitrogen 18.3 8.0 - 23.0 mg/dL    Creatinine 0.78 0.51 - 0.95 mg/dL    GFR Estimate 74 >60 mL/min/1.73m2    Calcium 9.5 8.8 - 10.4 mg/dL    Chloride 103 98 - 107 mmol/L    Glucose 123 (H) 70 - 99 mg/dL    Alkaline Phosphatase 51 40 - 150 U/L    AST 20 0 - 45 U/L    ALT 15 0 - 50 U/L    Protein Total 7.2 6.4 - 8.3 g/dL    Albumin 4.4 3.5 - 5.2 g/dL    Bilirubin Total 0.5 <=1.2 mg/dL   Extra Tube     Status: None (In process)    Narrative    The following orders were created for panel order  Extra Tube.  Procedure                               Abnormality         Status                     ---------                               -----------         ------                     Extra Serum Separator Tu...[[id number]]                      In process                   Please view results for these tests on the individual orders.   CBC with platelets and differential     Status: None   Result Value Ref Range    WBC Count 6.7 4.0 - 11.0 10e3/uL    RBC Count 4.08 3.80 - 5.20 10e6/uL    Hemoglobin 13.1 11.7 - 15.7 g/dL    Hematocrit 38.6 35.0 - 47.0 %    MCV 95 78 - 100 fL    MCH 32.1 26.5 - 33.0 pg    MCHC 33.9 31.5 - 36.5 g/dL    RDW 12.7 10.0 - 15.0 %    Platelet Count 206 150 - 450 10e3/uL    % Neutrophils 58 %    % Lymphocytes 27 %    % Monocytes 12 %    % Eosinophils 2 %    % Basophils 1 %    % Immature Granulocytes 0 %    NRBCs per 100 WBC 0 <1 /100    Absolute Neutrophils 3.9 1.6 - 8.3 10e3/uL    Absolute Lymphocytes 1.8 0.8 - 5.3 10e3/uL    Absolute Monocytes 0.8 0.0 - 1.3 10e3/uL    Absolute Eosinophils 0.2 0.0 - 0.7 10e3/uL    Absolute Basophils 0.1 0.0 - 0.2 10e3/uL    Absolute Immature Granulocytes 0.0 <=0.4 10e3/uL    Absolute NRBCs 0.0 10e3/uL   CBC with platelets differential     Status: None    Narrative    The following orders were created for panel order CBC with platelets differential.  Procedure                               Abnormality         Status                     ---------                               -----------         ------                     CBC with platelets and d...[[id number]]                      Final result                 Please view results for these tests on the individual orders.       Assessment and Plan:   Left Breast Cancer-Stage 1, T1aNx with LCIS previously treated in 2012 with lumpectomy, radiation and 5 years of Aromatase inhibitor use. No new symptoms today.   Right breast Cancer- Stage 1, pT1c NX with LCIS-Pt has been through Lumpectomy but no XRT then began  Letrozole since 6/2022.   She does have some side effects of occasional hot flash and hair thinning to top of head.   She has no sign of cancer return noted with this visit today.    Last mammogram was 7/2024 - repeat prior to next visit.   Pt will continue on Letrozole 2.5 mg po daily and return for recheck in 6 months     Bone health- most recent DEXA was 7/30/2024 showing osteopenia. Pt continues use of zometa every 6 months and is due for 4th infusion today.  Repeat DEX in 7/2026.   Pt will continue with dairy and calcium plus vitamin D supplement.     CHF- was hospitalized in 1/2025 for CHF and is under care of cardiology for follow up - using lasix daily and is weighing daily with good loss from hospitalization and no new symptoms today.     The total time of this encounter amounted to  35 minutes. This time included face to face time spent with the patient, prep work, review and ordering tests, and performing post visit documentation.  Nu Raygoza Cnp          Again, thank you for allowing me to participate in the care of your patient.        Sincerely,        Nu Raygoza NP, APRN CNP    Electronically signed

## 2025-03-12 ENCOUNTER — PRE VISIT (OUTPATIENT)
Dept: CARDIOLOGY | Facility: CLINIC | Age: 85
End: 2025-03-12
Payer: COMMERCIAL

## 2025-03-12 DIAGNOSIS — I50.30 HEART FAILURE WITH PRESERVED EJECTION FRACTION, NYHA CLASS I (H): Primary | ICD-10-CM

## 2025-03-20 ENCOUNTER — OFFICE VISIT (OUTPATIENT)
Dept: CARDIOLOGY | Facility: CLINIC | Age: 85
End: 2025-03-20
Payer: COMMERCIAL

## 2025-03-20 ENCOUNTER — LAB (OUTPATIENT)
Dept: LAB | Facility: CLINIC | Age: 85
End: 2025-03-20
Payer: COMMERCIAL

## 2025-03-20 VITALS
WEIGHT: 224 LBS | HEART RATE: 56 BPM | SYSTOLIC BLOOD PRESSURE: 122 MMHG | DIASTOLIC BLOOD PRESSURE: 66 MMHG | HEIGHT: 67 IN | BODY MASS INDEX: 35.16 KG/M2 | OXYGEN SATURATION: 99 %

## 2025-03-20 DIAGNOSIS — I50.30 HEART FAILURE WITH PRESERVED EJECTION FRACTION, NYHA CLASS I (H): ICD-10-CM

## 2025-03-20 DIAGNOSIS — I50.30 HEART FAILURE WITH PRESERVED EJECTION FRACTION, NYHA CLASS I (H): Primary | ICD-10-CM

## 2025-03-20 LAB
ANION GAP SERPL CALCULATED.3IONS-SCNC: 12 MMOL/L (ref 7–15)
BUN SERPL-MCNC: 13.1 MG/DL (ref 8–23)
CALCIUM SERPL-MCNC: 9.5 MG/DL (ref 8.8–10.4)
CHLORIDE SERPL-SCNC: 101 MMOL/L (ref 98–107)
CREAT SERPL-MCNC: 0.76 MG/DL (ref 0.51–0.95)
EGFRCR SERPLBLD CKD-EPI 2021: 76 ML/MIN/1.73M2
GLUCOSE SERPL-MCNC: 117 MG/DL (ref 70–99)
HCO3 SERPL-SCNC: 29 MMOL/L (ref 22–29)
NT-PROBNP SERPL-MCNC: 163 PG/ML (ref 0–1800)
POTASSIUM SERPL-SCNC: 4 MMOL/L (ref 3.4–5.3)
SODIUM SERPL-SCNC: 142 MMOL/L (ref 135–145)

## 2025-03-20 NOTE — PROGRESS NOTES
Cardiology Clinic Note    HPI    Dear colleagues,     I had the pleasure of seeing Ms. Ledia Goddard in the Cardiology clinic.  As you know, Ms. Leida Goddard is a pleasant 84 year old female with a past medical history of heart failure with preserved ejection fraction.  I first met her March 20, 2025.  She was previously seen within the [facility] system.    Patient presents with her granddaughter.  Her history is as follows.    Patient had a heart failure hospitalization in January 2025.  Her prior hospitalization was around Thanksgiving for COVID.  She had gained about 10 pounds from her baseline weight and was diuresed and discharged on oral Lasix.  Discharge weight was 235 pounds, admitted at 247.  Her current weight is 222.  At her largest weight she was in her 260s and has been working on weight loss since.  She is much more regimented with maintaining a sodium restriction.  I would classify her as NYHA class I.  She does not have any cardiopulmonary symptoms at this moment.  She has been taking 20 mg in the morning with additional 20 mg in the afternoon as needed.  She does not have any lightheadedness nor dizziness.  She still lives alone, still drives, lives in [facility].    CURRENT MEDICATIONS:  Current Outpatient Medications   Medication Sig Dispense Refill    calcium citrate-vitamin D (CITRACAL) 315-200 MG-UNIT TABS per tablet Take 1 tablet by mouth daily      cholecalciferol (VITAMIN D3) 10 mcg (400 units) TABS tablet Take 10 mcg by mouth daily      escitalopram (LEXAPRO) 10 MG tablet Take 1 tablet (10 mg) by mouth daily 90 tablet 3    furosemide (LASIX) 40 MG tablet Take 20 mg by mouth daily       letrozole (FEMARA) 2.5 MG tablet Take 1 tablet (2.5 mg) by mouth daily. 90 tablet 3    LEVOTHYROXINE SODIUM 25 MCG OR TABS 1 TABLET DAILY      losartan (COZAAR) 100 MG tablet Take 100 mg by mouth daily.      omeprazole (PRILOSEC) 20 MG DR capsule Take 20 mg by mouth daily      potassium  "chloride ER (K-DUR/KLOR-CON M) 20 MEQ CR tablet Take 20 mEq by mouth 2 times daily      PROPRANOLOL HCL PO Take 40 mg by mouth 2 times daily.      triamcinolone (KENALOG) 0.1 % external cream Apply twice daily as needed to the back, up to two weeks on at one time. 80 g 11    vitamin D2 (ERGOCALCIFEROL) 05942 units (1250 mcg) capsule Take 50,000 Units by mouth once a week      albuterol (PROAIR HFA/PROVENTIL HFA/VENTOLIN HFA) 108 (90 Base) MCG/ACT inhaler Inhale 2 puffs into the lungs (Patient not taking: Reported on 3/20/2025)         EXAM:  /66   Pulse 56   Ht 1.702 m (5' 7\")   Wt 101.6 kg (224 lb)   SpO2 99%   BMI 35.08 kg/m      General: appears comfortable, alert and articulate  Heart: regular, no murmur, estimated JVP 8  Extremities: no edema    Weight  Wt Readings from Last 10 Encounters:   03/20/25 101.6 kg (224 lb)   02/18/25 101.3 kg (223 lb 6.4 oz)   07/19/24 111.2 kg (245 lb 3.2 oz)   07/14/23 113.9 kg (251 lb)   06/15/23 112.5 kg (248 lb)   03/09/23 112.5 kg (248 lb)   10/28/22 108.9 kg (240 lb)   04/13/21 109.8 kg (242 lb 1 oz)   02/05/20 109.8 kg (242 lb 1.6 oz)   11/13/19 112.2 kg (247 lb 4.8 oz)     Outside results of note:  Outside records from [facility] Everywhere were obtained, and relevant results/notes have been incorporated into HPI.    Echocardiogram 12/18/2024:  Interpretation Summary     * The left ventricle is normal in size and function. EF is 60-65%. Wall motion is grossly normal.     * The right ventricle appears normal in size and function.     * There is no hemodynamically significant valvular heart disease.     * No pericardial effusion.     * The inferior vena cava is normal in size (< 2.1 cm), > 50% respiratory variance, RA pressure normal at 3 mmHg.     * Compared to study from 10/5/16, there are no significant changes.     EKG 1/6/2025: Sinus rhythm first-degree AV block, left anterior fascicular block    Assessment and Plan:     In summary, very pleasant " 84-year-old female with heart failure with preserved ejection fraction who presents for follow.    ACC/AHA stage C, NYHA class I    I do not see any other mimickers of HFpEF.  She has no red flag signs that concern me for amyloid, sarcoid, hypertrophic cardiomyopathy, restrictive cardiomyopathies.  I think her etiology of HFpEF is due to her age and demographic and comorbidities.    Current cardiac medications include  Lasix 20 mg daily  Losartan 75 mg daily    We did talk about SGLTi.  As she is NYHA class I, I did not push it.  I brought it up as an option for the future.  She does have type 2 diabetes that is diet controlled.  We discussed the risks and benefits and she will consider in the future, especially if symptoms were to worsen.    She may follow-up annually, may have her follow-up with a general cardiologist next year.    The patient states understanding and is agreeable with plan.   Feel free to contact myself regarding questions or concerns.  It was a pleasure to see this patient today.    Elizabeth Louis MD   of Medicine  Advanced Heart Failure and Transplant Cardiology    Today's clinic visit entailed:  58 minutes spent on the date of the encounter doing chart review, history and exam, documentation and further activities per the note.  The level of medical decision making during this visit was of moderate complexity.  The longitudinal plan of care for the diagnosis(es)/condition(s) as documented were addressed during this visit. Due to the added complexity in care, I will continue to support Leida Goddard in the subsequent management and with ongoing continuity of care.

## 2025-03-20 NOTE — PATIENT INSTRUCTIONS
Cardiology Providers you saw during your visit: Dr. Josefina Louis     Medication changes:  1- No Changes         Follow up:  1- Follow-up with General Cardiology in 1 year.        Please call if you have:  1. Weight gain of more than 2 pounds in a day or 5 pounds in a week  2. Increased shortness of breath, swelling or bloating  3. Dizziness, lightheadedness   4. Any questions or concerns.      Heart Failure Support Group  Virtual meetings will continue in 2023 Please reach out if you would like to attend and we can get you the information you need to log in.     2024 dates:     Monday, January 8th, 1-2pm     Monday, February 5th , 1-2pm     Monday, March 4th , 1-2pm     Monday, April 1st, 1-2pm     Monday, May 6th, 1-2pm     Monday, June 3rd, 1-2pm     Monday, July 1st, 1-2pm     Monday, August 5th, 1-2pm     Monday, September 9th, 1-2pm     Monday, October 7th, 1-2pm     Monday, November 4th, 1-2pm     Monday, December 2nd, 1-2pm     Follow the American Heart Association Diet and Lifestyle recommendations:  Limit saturated fat, trans fat, sodium, red meat, sweets and sugar-sweetened beverages. If you choose to eat red meat, compare labels and select the leanest cuts available.  Aim for at least 150 minutes of moderate physical activity or 75 minutes of vigorous physical activity - or an equal combination of both - each week.     During business hours: [phone], press option # 1 to schedule an appointment or send a message to your care team     After hours, weekends or holidays: On Call Cardiologist- [phone]   option #4 and ask to speak to the on-call Cardiologist. Inform them you are a CORE/heart failure patient at the [facility].     JUNIE Nelson  Cardiology Nurse Care Coordinator (Heart Failure / C.O.R.E.)

## 2025-03-20 NOTE — NURSING NOTE
"Chief Complaint   Patient presents with    New Patient     NPI       Initial BP (!) 144/65 (BP Location: Right arm, Patient Position: Chair, Cuff Size: Adult Large)   Pulse 56   Ht 1.702 m (5' 7\")   Wt 101.6 kg (224 lb)   SpO2 99%   BMI 35.08 kg/m   Estimated body mass index is 35.08 kg/m  as calculated from the following:    Height as of this encounter: 1.702 m (5' 7\").    Weight as of this encounter: 101.6 kg (224 lb)..  BP completed using cuff size: manisha SARAVIA  "

## 2025-03-20 NOTE — NURSING NOTE
Return Appointment:   1- Follow-up with General Cardiology in 1 year.   Patient given instructions regarding scheduling next clinic visit. Patient demonstrated understanding of this information and agreed to call with further questions or concerns.    Patient stated she understood all health information given and agreed to call with further questions or concerns.    Alisson Infante RN

## 2025-05-27 ENCOUNTER — VIRTUAL VISIT (OUTPATIENT)
Dept: ONCOLOGY | Facility: CLINIC | Age: 85
End: 2025-05-27
Attending: NURSE PRACTITIONER
Payer: COMMERCIAL

## 2025-05-27 DIAGNOSIS — M81.0 AGE-RELATED OSTEOPOROSIS WITHOUT CURRENT PATHOLOGICAL FRACTURE: ICD-10-CM

## 2025-05-27 DIAGNOSIS — C50.911 INVASIVE DUCTAL CARCINOMA OF BREAST, FEMALE, RIGHT (H): ICD-10-CM

## 2025-06-03 DIAGNOSIS — C50.911 INVASIVE DUCTAL CARCINOMA OF BREAST, FEMALE, RIGHT (H): ICD-10-CM

## 2025-06-03 DIAGNOSIS — Z17.0 MALIGNANT NEOPLASM OF OVERLAPPING SITES OF RIGHT BREAST IN FEMALE, ESTROGEN RECEPTOR POSITIVE (H): ICD-10-CM

## 2025-06-03 DIAGNOSIS — C50.811 MALIGNANT NEOPLASM OF OVERLAPPING SITES OF RIGHT BREAST IN FEMALE, ESTROGEN RECEPTOR POSITIVE (H): ICD-10-CM

## 2025-06-03 RX ORDER — TAMOXIFEN CITRATE 10 MG/1
10 TABLET ORAL DAILY
Qty: 90 TABLET | Refills: 3 | Status: SHIPPED | OUTPATIENT
Start: 2025-06-03

## 2025-06-24 ENCOUNTER — ONCOLOGY VISIT (OUTPATIENT)
Dept: ONCOLOGY | Facility: CLINIC | Age: 85
End: 2025-06-24
Attending: INTERNAL MEDICINE
Payer: COMMERCIAL

## 2025-06-24 VITALS
OXYGEN SATURATION: 98 % | DIASTOLIC BLOOD PRESSURE: 68 MMHG | RESPIRATION RATE: 18 BRPM | BODY MASS INDEX: 34.77 KG/M2 | WEIGHT: 222 LBS | HEART RATE: 57 BPM | SYSTOLIC BLOOD PRESSURE: 123 MMHG

## 2025-06-24 DIAGNOSIS — C50.812 MALIGNANT NEOPLASM OF OVERLAPPING SITES OF LEFT BREAST IN FEMALE, ESTROGEN RECEPTOR POSITIVE (H): Primary | ICD-10-CM

## 2025-06-24 DIAGNOSIS — Z17.0 MALIGNANT NEOPLASM OF OVERLAPPING SITES OF LEFT BREAST IN FEMALE, ESTROGEN RECEPTOR POSITIVE (H): Primary | ICD-10-CM

## 2025-06-24 DIAGNOSIS — C50.912 MALIGNANT NEOPLASM OF LEFT BREAST IN FEMALE, ESTROGEN RECEPTOR POSITIVE, UNSPECIFIED SITE OF BREAST (H): ICD-10-CM

## 2025-06-24 DIAGNOSIS — Z17.0 MALIGNANT NEOPLASM OF LEFT BREAST IN FEMALE, ESTROGEN RECEPTOR POSITIVE, UNSPECIFIED SITE OF BREAST (H): ICD-10-CM

## 2025-06-24 DIAGNOSIS — Z12.31 ENCOUNTER FOR SCREENING MAMMOGRAM FOR MALIGNANT NEOPLASM OF BREAST: ICD-10-CM

## 2025-06-24 PROCEDURE — 99213 OFFICE O/P EST LOW 20 MIN: CPT | Performed by: INTERNAL MEDICINE

## 2025-06-24 PROCEDURE — G0463 HOSPITAL OUTPT CLINIC VISIT: HCPCS | Performed by: INTERNAL MEDICINE

## 2025-06-24 RX ORDER — MULTIVITAMIN WITH IRON
1 TABLET ORAL DAILY
COMMUNITY

## 2025-06-24 NOTE — PROGRESS NOTES
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  Patient is s/p hysterectomy and BSO.  LEFT breast cancer :   - 2012-screening mammogram showed grouped calcifications in the left breast at 7:00 posterior depth, increased in number, confirmed on ultrasound  - 2012-stereotactic biopsy with clip placement, pathology showed LCIS with fibroadenomatoid changes with microcalcifications  - 2013- left breast lumpectomy with pathology showin microscopic foci of invasive lobular carcinoma, largest focus 0.11 cm others 0.1 cm, 0.08 cm, 0.05 cm, overall grade 2, margins negative, multiple scattered foci of LCIS within the specimen, no DCIS, no LVI, no lymph nodes submitted.  %, FL 80%, HER2 2+ on IHC, FISH negative.  Pathological staging AJCC seventh edition pT1a (M) NX        RIGHT breast cancer :    bilateral screening mammogram- heterogeneously dense breast, postsurgical changes in the left breast, scattered calcifications in right breast.  1.5 cm asymmetry, in line with the nipple, deep within the right breast, needs further evaluation  - right breast ultrasound: Spiculated hypoechoic mass at the 9 o'clock position posterior depth measuring 1.5 cm in greatest dimension, right axilla no abnormal lymph nodes, BI-RADS  5  8/22- ultrasound-guided biopsy of right breast lesion with clip placement- 5x14 gauge core biopsies obtained  9/16/2022-right breast lumpectomy with second excision for involved margins with pathology showing:  A.  Right breast tissue, radioactive seed guided lumpectomy-Invasive ductal carcinoma, involving inferior and medial margins.  B.  Right breast inferior medial margin, excision-Invasive ductal carcinoma. New inferior and medial margins are free.  ---Invasive ductal carcinoma, grade 3, 1.7 cm, no LVI, dermal lymphovascular invasion N/A, all margins negative for invasive carcinoma (0.55 cm to new inferior margin on specimen B), regional lymph node status N/A (no lymph nodes submitted or found)  ---ER/KY positive  --- HER2 zero on IHC  --- Pathologic stage classification (AJCC eighth edition) pT1c NX   ---- margins clear     11/22 Oncotype DX breast recurrence score: 18     -10/22 radiation oncology consultation with Dr. Michelle: Based on CALGB 9343 ([facility] 2013) trial, will omit radiation therapy  - 12/22-6/23 tamoxifen used  - 6/23 started letrozole         Plan and Assessment:    Patient is a 85 year old, White, female with a history of stage I ER/KY positive HER2 negative right breast cancer in 2022 as well as left stage I ER/KY HER2 intermediate left breast cancer diagnosed in 2012.      The patient had been on anastrozole for approximately 2 years but cannot tolerate the side effects.  As such we switched her to tamoxifen low-dose 10 mg to see if this would be better tolerated.  Patient had previously been on tamoxifen from 12/22 through 6/23 but then switched to letrozole in June 2023.    Patient reports that she is tolerating the 10 mg tamoxifen very well.  Patient had a mammogram last in July 2024; as such, we will get a mammogram scheduled for next month in July 2025.  Patient will follow-up in approximately 1 year.          Review of Systems:  10 point ROS negative except for that above.    Past  "Medical / Surgical History:  Past Medical History:   Diagnosis Date    Breast cancer (H) 2012    Left breast    Breast cancer (H) 2022    Right Breast    Essential hypertension, benign     Hypertension, Benign    DALIA (obstructive sleep apnea)     S/P radiation therapy     6,040 cGy to left breast completed on 2013 [facility]    Type II or unspecified type diabetes mellitus without mention of complication, not stated as uncontrolled     Diabetes mellitus     Past Surgical History:   Procedure Laterality Date    BREAST BIOPSY, CORE RT/LT Right 2022    CHOLECYSTECTOMY  1966    HYSTERECTOMY TOTAL ABDOMINAL, BILATERAL SALPINGO-OOPHORECTOMY, COMBINED      age 43    LUMPECTOMY BREAST  2013    Left breast    LUMPECTOMY BREAST Right 2022    Dr. Joseph    REPAIR ENTROPION Left 2021    Procedure: REPAIR, ENTROPION;  Surgeon: Susy Marie MD;  Location: [facility] OR    SPINE SURGERY      Lumbar laminectomy    US BIOPSY PAROTID FINE NEEDLE ASPIRATION Right 2022    [facility] Tumor    ZZC ANESTH,BLEPHAROPLASTY       Patient Active Problem List   Diagnosis    Diabetes mellitus (H)    DALIA (obstructive sleep apnea)    HBP (high blood pressure)    Cushing's syndrome    Hypothyroidism    Cancer of breast (H)    S/P total knee arthroplasty: lt    S/P total hip arthroplasty: Rt    Anemia due to blood loss, acute    Edema    Hypercholesterolemia    Malignant neoplasm of overlapping sites of left breast in female, estrogen receptor positive (H)    Entropion of left lower eyelid    Age-related osteoporosis without current pathological fracture       Family History:  Family History   Problem Relation Age of Onset    Cancer Mother 40        Uterine, diagnosed late \"40s\",  at age 83    Respiratory Mother         Emphysema    Cancer Father 50        prostate with bone mets,  age 91    Cancer Brother 80        prostate, radiation therapy, living    " Cancer Maternal Grandmother         Cancer of the gallbladder    Heart Disease Maternal Grandfather     Cancer Paternal Grandmother         Breast, age unknown    Heart Disease Paternal Grandfather     Breast Cancer Paternal Aunt        Social History:  Social History     Tobacco Use    Smoking status: Never    Smokeless tobacco: Never   Substance Use Topics    Alcohol use: No    Drug use: No       Medications   Reviewed in EMR.    Allergies  Allergies   Allergen Reactions    Metformin Nausea and Vomiting    Adhesive Tape Hives    Amoxicillin-Pot Clavulanate Diarrhea    Doxycycline Nausea and Vomiting    Food      Lactose Intolerant    Hctz Rash    Lisinopril Cough       Imaging  No images are attached to the encounter.     Laboratories    Physical Exam:  VS: There were no vitals taken for this visit.  GEN: Well appearing.      I saw and examined the patient for 25 minutes  where greater than 50% was spent coming up with a treatment plan, conferring with patient and reviewing history.                   --------------------

## 2025-06-24 NOTE — NURSING NOTE
"Oncology Rooming Note    June 24, 2025 3:40 PM   Leida Goddard is a 85 year old female who presents for:    Chief Complaint   Patient presents with    Oncology Clinic Visit     1 month follow up     Initial Vitals: /68   Pulse 57   Resp 18   Wt 100.7 kg (222 lb)   SpO2 98%   BMI 34.77 kg/m   Estimated body mass index is 34.77 kg/m  as calculated from the following:    Height as of 3/20/25: 1.702 m (5' 7\").    Weight as of this encounter: 100.7 kg (222 lb). Body surface area is 2.18 meters squared.  Data Unavailable Comment: Data Unavailable   No LMP recorded. Patient has had a hysterectomy.  Allergies reviewed: Yes  Medications reviewed: Yes    Medications: Medication refills not needed today.  Pharmacy name entered into [facility]: [id number] - [place], [place] - [address] [facility] [address]    Frailty Screening:   Is the patient here for a new oncology consult visit in cancer care? 2. No    PHQ9:  Did this patient require a PHQ9?: No      Clinical concerns: tolerating tamoxifen well       Celsa Rangel LPN              "

## 2025-06-24 NOTE — LETTER
2025      Leida Goddard  [address]  [facility] [address]      Dear Colleague,    Thank you for referring your patient, Leida Goddard, to the [facility] [place] [facility]. Please see a copy of my visit note below.    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  Patient is s/p hysterectomy and BSO.  LEFT breast cancer :   - 2012-screening mammogram showed grouped calcifications in the left breast at 7:00 posterior depth, increased in number, confirmed on ultrasound  - 2012-stereotactic biopsy with clip placement, pathology showed LCIS with fibroadenomatoid changes with microcalcifications  - 2013- left breast lumpectomy with pathology showin microscopic foci of invasive lobular carcinoma, largest focus 0.11 cm others 0.1 cm, 0.08 cm, 0.05 cm, overall grade 2, margins negative, multiple scattered foci of LCIS within the specimen, no DCIS, no LVI, no lymph nodes submitted.  %, ME 80%, HER2 2+ on IHC, FISH negative.  Pathological staging AJCC seventh edition pT1a (M) NX        RIGHT breast cancer :    bilateral screening mammogram- heterogeneously dense breast, postsurgical changes in the left breast, scattered calcifications in right breast.  1.5 cm asymmetry, in line with  the nipple, deep within the right breast, needs further evaluation  8/22- right breast ultrasound: Spiculated hypoechoic mass at the 9 o'clock position posterior depth measuring 1.5 cm in greatest dimension, right axilla no abnormal lymph nodes, BI-RADS 5  8/22- ultrasound-guided biopsy of right breast lesion with clip placement- 5x14 gauge core biopsies obtained  9/16/2022-right breast lumpectomy with second excision for involved margins with pathology showing:  A.  Right breast tissue, radioactive seed guided lumpectomy-Invasive ductal carcinoma, involving inferior and medial margins.  B.  Right breast inferior medial margin, excision-Invasive ductal carcinoma. New inferior and medial margins are free.  ---Invasive ductal carcinoma, grade 3, 1.7 cm, no LVI, dermal lymphovascular invasion N/A, all margins negative for invasive carcinoma (0.55 cm to new inferior margin on specimen B), regional lymph node status N/A (no lymph nodes submitted or found)  ---ER/OK positive  --- HER2 zero on IHC  --- Pathologic stage classification (AJCC eighth edition) pT1c NX   ---- margins clear     11/22 Oncotype DX breast recurrence score: 18     -10/22 radiation oncology consultation with Dr. Michelle: Based on CALGB 9343 ([place], [facility]OO 2013) trial, will omit radiation therapy  - 12/22-6/23 tamoxifen used  - 6/23 started letrozole         Plan and Assessment:    Patient is a 85 year old, White, female with a history of stage I ER/OK positive HER2 negative right breast cancer in 2022 as well as left stage I ER/OK HER2 intermediate left breast cancer diagnosed in 2012.      The patient had been on anastrozole for approximately 2 years but cannot tolerate the side effects.  As such we switched her to tamoxifen low-dose 10 mg to see if this would be better tolerated.  Patient had previously been on tamoxifen from 12/22 through 6/23 but then switched to letrozole in June 2023.    Patient reports that she is tolerating the 10 mg tamoxifen very  well.  Patient had a mammogram last in July 2024; as such, we will get a mammogram scheduled for next month in July 2025.  Patient will follow-up in approximately 1 year.          Review of Systems:  10 point ROS negative except for that above.    Past Medical / Surgical History:  Past Medical History:   Diagnosis Date     Breast cancer (H) 12/07/2012    Left breast     Breast cancer (H) 08/11/2022    Right Breast     Essential hypertension, benign     Hypertension, Benign     DALIA (obstructive sleep apnea)      S/P radiation therapy     6,040 cGy to left breast completed on 4/9/2013 [facility]     Type II or unspecified type diabetes mellitus without mention of complication, not stated as uncontrolled     Diabetes mellitus     Past Surgical History:   Procedure Laterality Date     BREAST BIOPSY, CORE RT/LT Right 08/11/2022     CHOLECYSTECTOMY  01/01/1966     HYSTERECTOMY TOTAL ABDOMINAL, BILATERAL SALPINGO-OOPHORECTOMY, COMBINED      age 43     LUMPECTOMY BREAST  01/07/2013    Left breast     LUMPECTOMY BREAST Right 09/16/2022    Dr. Joseph     REPAIR ENTROPION Left 04/19/2021    Procedure: REPAIR, ENTROPION;  Surgeon: Susy Marie MD;  Location: [facility] OR     SPINE SURGERY      Lumbar laminectomy     US BIOPSY PAROTID FINE NEEDLE ASPIRATION Right 02/08/2022    [facility] Tumor     ZZC ANESTH,BLEPHAROPLASTY       Patient Active Problem List   Diagnosis     Diabetes mellitus (H)     DALIA (obstructive sleep apnea)     HBP (high blood pressure)     Cushing's syndrome     Hypothyroidism     Cancer of breast (H)     S/P total knee arthroplasty: lt     S/P total hip arthroplasty: Rt     Anemia due to blood loss, acute     Edema     Hypercholesterolemia     Malignant neoplasm of overlapping sites of left breast in female, estrogen receptor positive (H)     Entropion of left lower eyelid     Age-related osteoporosis without current pathological fracture       Family History:  Family History  "  Problem Relation Age of Onset     Cancer Mother 40        Uterine, diagnosed late \"40s\",  at age 83     Respiratory Mother         Emphysema     Cancer Father 50        prostate with bone mets,  age 91     Cancer Brother 80        prostate, radiation therapy, living     Cancer Maternal Grandmother         Cancer of the gallbladder     Heart Disease Maternal Grandfather      Cancer Paternal Grandmother         Breast, age unknown     Heart Disease Paternal Grandfather      Breast Cancer Paternal Aunt        Social History:  Social History     Tobacco Use     Smoking status: Never     Smokeless tobacco: Never   Substance Use Topics     Alcohol use: No     Drug use: No       Medications   Reviewed in EMR.    Allergies  Allergies   Allergen Reactions     Metformin Nausea and Vomiting     Adhesive Tape Hives     Amoxicillin-Pot Clavulanate Diarrhea     Doxycycline Nausea and Vomiting     Food      Lactose Intolerant     Hctz Rash     Lisinopril Cough       Imaging  No images are attached to the encounter.     Laboratories    Physical Exam:  VS: There were no vitals taken for this visit.  GEN: Well appearing.      I saw and examined the patient for 25 minutes  where greater than 50% was spent coming up with a treatment plan, conferring with patient and reviewing history.                   --------------------      Again, thank you for allowing me to participate in the care of your patient.        Sincerely,        Damien Allen MD    Electronically signed"

## 2025-07-30 ENCOUNTER — ANCILLARY PROCEDURE (OUTPATIENT)
Dept: MAMMOGRAPHY | Facility: CLINIC | Age: 85
End: 2025-07-30
Attending: INTERNAL MEDICINE
Payer: COMMERCIAL

## 2025-07-30 DIAGNOSIS — Z17.0 MALIGNANT NEOPLASM OF OVERLAPPING SITES OF LEFT BREAST IN FEMALE, ESTROGEN RECEPTOR POSITIVE (H): ICD-10-CM

## 2025-07-30 DIAGNOSIS — C50.912 MALIGNANT NEOPLASM OF LEFT BREAST IN FEMALE, ESTROGEN RECEPTOR POSITIVE, UNSPECIFIED SITE OF BREAST (H): ICD-10-CM

## 2025-07-30 DIAGNOSIS — Z12.31 ENCOUNTER FOR SCREENING MAMMOGRAM FOR MALIGNANT NEOPLASM OF BREAST: ICD-10-CM

## 2025-07-30 DIAGNOSIS — C50.812 MALIGNANT NEOPLASM OF OVERLAPPING SITES OF LEFT BREAST IN FEMALE, ESTROGEN RECEPTOR POSITIVE (H): ICD-10-CM

## 2025-07-30 DIAGNOSIS — Z17.0 MALIGNANT NEOPLASM OF LEFT BREAST IN FEMALE, ESTROGEN RECEPTOR POSITIVE, UNSPECIFIED SITE OF BREAST (H): ICD-10-CM

## 2025-07-30 PROCEDURE — 77067 SCR MAMMO BI INCL CAD: CPT | Mod: GC

## 2025-07-30 PROCEDURE — 77063 BREAST TOMOSYNTHESIS BI: CPT | Mod: GC

## (undated) DEVICE — NDL 30GA 0.5" 305106

## (undated) DEVICE — PACK MINOR EYE

## (undated) DEVICE — SU VICRYL 6-0 S-14DA 18" J570G

## (undated) DEVICE — SU VICRYL 5-0 S-14 12" J553G

## (undated) DEVICE — ESU ELEC NDL 1" COATED/INSULATED E1465

## (undated) DEVICE — GLOVE PROTEXIS W/NEU-THERA 7.5  2D73TE75

## (undated) DEVICE — SYR 03ML LL W/O NDL

## (undated) DEVICE — SOL WATER IRRIG 1000ML BOTTLE 07139-09

## (undated) RX ORDER — LIDOCAINE HYDROCHLORIDE 20 MG/ML
INJECTION, SOLUTION INFILTRATION; PERINEURAL
Status: DISPENSED
Start: 2021-04-19

## (undated) RX ORDER — PROPOFOL 10 MG/ML
INJECTION, EMULSION INTRAVENOUS
Status: DISPENSED
Start: 2021-04-19

## (undated) RX ORDER — FENTANYL CITRATE 50 UG/ML
INJECTION, SOLUTION INTRAMUSCULAR; INTRAVENOUS
Status: DISPENSED
Start: 2021-04-19

## (undated) RX ORDER — LIDOCAINE HCL/EPINEPHRINE/PF 2%-1:200K
VIAL (ML) INJECTION
Status: DISPENSED
Start: 2021-04-19

## (undated) RX ORDER — BUPIVACAINE HYDROCHLORIDE 5 MG/ML
INJECTION, SOLUTION PERINEURAL
Status: DISPENSED
Start: 2021-04-19

## (undated) RX ORDER — TETRACAINE HYDROCHLORIDE 5 MG/ML
SOLUTION OPHTHALMIC
Status: DISPENSED
Start: 2021-04-19

## (undated) RX ORDER — ERYTHROMYCIN 5 MG/G
OINTMENT OPHTHALMIC
Status: DISPENSED
Start: 2021-04-19

## (undated) RX ORDER — ACETAMINOPHEN 325 MG/1
TABLET ORAL
Status: DISPENSED
Start: 2021-04-19